# Patient Record
Sex: FEMALE | Race: OTHER | Employment: FULL TIME | URBAN - METROPOLITAN AREA
[De-identification: names, ages, dates, MRNs, and addresses within clinical notes are randomized per-mention and may not be internally consistent; named-entity substitution may affect disease eponyms.]

---

## 2022-11-18 ENCOUNTER — HOSPITAL ENCOUNTER (EMERGENCY)
Age: 47
Discharge: HOME | End: 2022-11-18
Attending: EMERGENCY MEDICINE
Payer: COMMERCIAL

## 2022-11-18 ENCOUNTER — APPOINTMENT (OUTPATIENT)
Dept: CT IMAGING | Age: 47
End: 2022-11-18
Payer: COMMERCIAL

## 2022-11-18 VITALS
WEIGHT: 140 LBS | RESPIRATION RATE: 13 BRPM | HEART RATE: 83 BPM | SYSTOLIC BLOOD PRESSURE: 134 MMHG | DIASTOLIC BLOOD PRESSURE: 91 MMHG | HEIGHT: 62 IN | OXYGEN SATURATION: 99 % | BODY MASS INDEX: 25.76 KG/M2 | TEMPERATURE: 97 F

## 2022-11-18 DIAGNOSIS — S01.01XA LACERATION OF SCALP, INITIAL ENCOUNTER: Primary | ICD-10-CM
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
## 2022-11-18 PROCEDURE — 72125 CT NECK SPINE W/O DYE: CPT

## 2022-11-18 PROCEDURE — 12002 RPR S/N/AX/GEN/TRNK2.6-7.5CM: CPT

## 2022-11-18 PROCEDURE — 70450 CT HEAD/BRAIN W/O DYE: CPT

## 2022-11-18 PROCEDURE — 99284 EMERGENCY DEPT VISIT MOD MDM: CPT

## 2022-11-18 ASSESSMENT — ENCOUNTER SYMPTOMS
ABDOMINAL PAIN: 0
SHORTNESS OF BREATH: 0
NAUSEA: 0
FACIAL SWELLING: 0
VOMITING: 0
BACK PAIN: 0
COUGH: 0
ABDOMINAL DISTENTION: 0

## 2022-11-18 ASSESSMENT — PAIN - FUNCTIONAL ASSESSMENT: PAIN_FUNCTIONAL_ASSESSMENT: NONE - DENIES PAIN

## 2022-11-18 NOTE — ED NOTES
Writer entered pt's room to assess her, pt was out of bed, stated she was getting dressed. Pt was educated that she needs to stay in bed and she needs to stay at hospital until she is medically cleared. Pt verbalizes understanding but states she wants to go home. Dr. Bethany Hays notified.       Niki Benedict RN  11/18/22 1800

## 2022-11-18 NOTE — DISCHARGE INSTRUCTIONS
Go to your primary care physician or return to the emergency department in 7-14 days, to have your staples removed    For pain use acetaminophen (Tylenol) or ibuprofen (Motrin / Advil), unless prescribed medications that have acetaminophen or ibuprofen (or similar medications) in it. You can take over the counter acetaminophen tablets (1 - 2 tablets of the 500-mg strength every 6 hours) or ibuprofen tablets (2 tablets every 4 hours). You can shower with the laceration, would avoid baths or swimming in lakes / rivers. Apply bacitracin / triple antibiotic ointment / Neosporin / Vaseline to the wound twice a day. When you go outside, place sunscreen on the healing wound after the sutures have been removed for the next year to help with scarring. PLEASE RETURN TO THE EMERGENCY DEPARTMENT IMMEDIATELY for worsening symptoms, redness around the wound or redness streaking up the body part, white drainage from the wound, or if you develop any concerning symptoms such as: high fever not relieved by acetaminophen (Tylenol) and/or ibuprofen (Motrin / Advil), chills, shortness of breath, chest pain, feeling of your heart fluttering or racing, persistent nausea and/or vomiting, vomiting up blood, blood in your stool, numbness, loss of consciousness, weakness or tingling in the arms or legs or change in color of the extremities, changes in mental status, persistent headache, blurry vision, loss of bladder / bowel control, unable to follow up with your physician, or other any other care or concern.
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
## 2022-11-18 NOTE — PROCEDURES
PROCEDURE NOTE - LACERATION CLOSURE    PATIENT NAME: Anne Brooks RECORD NO. [id number]  DATE: 11/18/2022  Attending: Dr. Kathy Hyman / Stone Gonzalez MD  PRIMARY CARE PHYSICIAN: No primary care provider on file. PREOPERATIVE DIAGNOSIS: Laceration(s) as follows:   LOCATION: Scalp   LENGTH: 3cm   LAYERED CLOSURE: No    POSTOPERATIVE DIAGNOSIS:  Same  PROCEDURE PERFORMED:  Suture closure of laceration  ANESTHESIA:  Local utilizing  Lidocaine 1% without epinephrine  ESTIMATED BLOOD LOSS:  Less than 25 ml. COMPLICATIONS:  None immediately appreciated. OPERATIVE NOTE PREPARED BY: Stone Gonzalez MD     DISCUSSION:  Tino Richardson is a 56y.o.-year-old female. The history and physical examination were reviewed and confirmed. The diagnoses, proposed procedure, risks, possible complications, benefits and alternatives were discussed with the patient or family. She was given the opportunity to ask questions, and once answered, informed consent was obtained. The patient was then prepared for the procedure. PROCEDURE:  A timeout was initiated and the procedure and patient were confirmed by those present. The laceration was flushed copiously with sterile saline. Thereafter using a total of 4 mL of 1% lidocaine without epinephrine, the wound was anesthetized via local infiltration. 4 staples were applied to the laceration achieving good wound approximation. No immediate complication was evident. All sponge, instrument and needle counts were correct at the completion of the procedure.        Stone Gonzalez MD  11/18/22, 3:15 AM

## 2022-12-02 ENCOUNTER — HOSPITAL ENCOUNTER (EMERGENCY)
Age: 47
Discharge: HOME | End: 2022-12-02
Attending: EMERGENCY MEDICINE
Payer: COMMERCIAL

## 2022-12-02 VITALS
OXYGEN SATURATION: 97 % | HEART RATE: 60 BPM | TEMPERATURE: 97.2 F | DIASTOLIC BLOOD PRESSURE: 72 MMHG | RESPIRATION RATE: 16 BRPM | SYSTOLIC BLOOD PRESSURE: 114 MMHG

## 2022-12-02 DIAGNOSIS — Z48.02 REMOVAL OF STAPLE: Primary | ICD-10-CM

## 2022-12-02 PROCEDURE — 99283 EMERGENCY DEPT VISIT LOW MDM: CPT

## 2022-12-02 PROCEDURE — 6370000000 HC RX 637 (ALT 250 FOR IP)

## 2022-12-02 RX ORDER — IBUPROFEN 800 MG/1
800 TABLET ORAL ONCE
Status: COMPLETED | OUTPATIENT
Start: 2022-12-02 | End: 2022-12-02

## 2022-12-02 RX ORDER — IBUPROFEN 600 MG/1
600 TABLET ORAL 3 TIMES DAILY PRN
Qty: 30 TABLET | Refills: 0 | Status: SHIPPED | OUTPATIENT
Start: 2022-12-02

## 2022-12-02 RX ADMIN — IBUPROFEN 800 MG: 800 TABLET, FILM COATED ORAL at 14:51

## 2022-12-02 ASSESSMENT — PAIN SCALES - GENERAL: PAINLEVEL_OUTOF10: 4

## 2022-12-02 ASSESSMENT — PAIN - FUNCTIONAL ASSESSMENT: PAIN_FUNCTIONAL_ASSESSMENT: NONE - DENIES PAIN

## 2022-12-02 NOTE — DISCHARGE INSTRUCTIONS
Thank you for visiting [facility] Emergency Department. You need to call [facility] to make an appointment as directed for follow up. Should you have any questions regarding your care or further treatment, please call [facility] at [phone].

## 2022-12-02 NOTE — ED PROVIDER NOTES
[facility]  Emergency Department  Faculty Attestation     I performed a history and physical examination of the patient and discussed management with the resident. I reviewed the residents note and agree with the documented findings and plan of care. Any areas of disagreement are noted on the chart. I was personally present for the key portions of any procedures. I have documented in the chart those procedures where I was not present during the key portions. I have reviewed the emergency nurses triage note. I agree with the chief complaint, past medical history, past surgical history, allergies, medications, social and family history as documented unless otherwise noted below. For Physician Assistant/ Nurse Practitioner cases/documentation I have personally evaluated this patient and have completed at least one if not all key elements of the E/M (history, physical exam, and MDM). Additional findings are as noted. Primary Care Physician:  No primary care provider on file. Screenings:  [unfilled]    CHIEF COMPLAINT       Chief Complaint   Patient presents with    Suture / Staple Removal       RECENT VITALS:   Temp: 97.2 °F (36.2 °C),  Heart Rate: 60, Resp: 16, BP: 114/72    LABS:  Labs Reviewed - No data to display    Radiology  No orders to display           Attending Physician Additional  Notes    Patient is here for staple removal of the scalp. There is no drainage fever redness pain or other complaints. On exam she nontoxic afebrile vital signs are normal.  GCS is 15. Normal speech mentation and pupils. Scalp is healed well without evidence of infection. There is no drainage bleeding. Wound is well approximated. Plan is staple removal, bacitracin, follow-up. Ina Chimera.  Luna Enciso MD, [facility]  Attending Emergency  Physician                Roni Dougherty MD  12/02/22 1039

## 2022-12-02 NOTE — ED NOTES
This patient was assessed by the doctor only. Nurse processed and completed the orders from this doctor ie labs, meds, and/or EKG.         Kisha Khan LPN  55/95/46 0375

## 2022-12-02 NOTE — Clinical Note
Cesar Whitney was seen and treated in our emergency department on 12/2/2022. She may return to work on 12/04/2022. If you have any questions or concerns, please don't hesitate to call.       Sandra Wolfe MD
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
## 2024-06-04 ENCOUNTER — HOSPITAL ENCOUNTER (EMERGENCY)
Age: 49
Discharge: HOME | End: 2024-06-05
Attending: EMERGENCY MEDICINE

## 2024-06-04 DIAGNOSIS — N20.0 BILATERAL NEPHROLITHIASIS: ICD-10-CM

## 2024-06-04 DIAGNOSIS — R10.9 LEFT FLANK PAIN: Primary | ICD-10-CM

## 2024-06-04 PROCEDURE — 80048 BASIC METABOLIC PNL TOTAL CA: CPT

## 2024-06-04 PROCEDURE — 84703 CHORIONIC GONADOTROPIN ASSAY: CPT

## 2024-06-04 PROCEDURE — 96374 THER/PROPH/DIAG INJ IV PUSH: CPT | Performed by: EMERGENCY MEDICINE

## 2024-06-04 PROCEDURE — 2580000003 HC RX 258

## 2024-06-04 PROCEDURE — 96375 TX/PRO/DX INJ NEW DRUG ADDON: CPT | Performed by: EMERGENCY MEDICINE

## 2024-06-04 PROCEDURE — 6360000002 HC RX W HCPCS

## 2024-06-04 PROCEDURE — 96361 HYDRATE IV INFUSION ADD-ON: CPT | Performed by: EMERGENCY MEDICINE

## 2024-06-04 PROCEDURE — 99284 EMERGENCY DEPT VISIT MOD MDM: CPT | Performed by: EMERGENCY MEDICINE

## 2024-06-04 PROCEDURE — 6370000000 HC RX 637 (ALT 250 FOR IP)

## 2024-06-04 PROCEDURE — 85025 COMPLETE CBC W/AUTO DIFF WBC: CPT

## 2024-06-04 PROCEDURE — 81001 URINALYSIS AUTO W/SCOPE: CPT

## 2024-06-04 RX ORDER — 0.9 % SODIUM CHLORIDE 0.9 %
1000 INTRAVENOUS SOLUTION INTRAVENOUS ONCE
Status: COMPLETED | OUTPATIENT
Start: 2024-06-04 | End: 2024-06-05

## 2024-06-04 RX ORDER — ONDANSETRON 2 MG/ML
4 INJECTION INTRAMUSCULAR; INTRAVENOUS ONCE
Status: COMPLETED | OUTPATIENT
Start: 2024-06-04 | End: 2024-06-04

## 2024-06-04 RX ORDER — ACETAMINOPHEN 500 MG
1000 TABLET ORAL ONCE
Status: COMPLETED | OUTPATIENT
Start: 2024-06-04 | End: 2024-06-04

## 2024-06-04 RX ADMIN — SODIUM CHLORIDE 1000 ML: 9 INJECTION, SOLUTION INTRAVENOUS at 23:48

## 2024-06-04 RX ADMIN — ACETAMINOPHEN 1000 MG: 500 TABLET ORAL at 23:48

## 2024-06-04 RX ADMIN — ONDANSETRON 4 MG: 2 INJECTION INTRAMUSCULAR; INTRAVENOUS at 23:48

## 2024-06-04 ASSESSMENT — PAIN DESCRIPTION - LOCATION: LOCATION: FLANK

## 2024-06-04 ASSESSMENT — PAIN DESCRIPTION - DESCRIPTORS: DESCRIPTORS: ACHING;SORE;DISCOMFORT

## 2024-06-04 ASSESSMENT — PAIN - FUNCTIONAL ASSESSMENT: PAIN_FUNCTIONAL_ASSESSMENT: 0-10

## 2024-06-04 ASSESSMENT — PAIN DESCRIPTION - ORIENTATION: ORIENTATION: LEFT

## 2024-06-05 ENCOUNTER — APPOINTMENT (OUTPATIENT)
Dept: CT IMAGING | Age: 49
End: 2024-06-05

## 2024-06-05 VITALS
RESPIRATION RATE: 14 BRPM | BODY MASS INDEX: 27.07 KG/M2 | WEIGHT: 148 LBS | OXYGEN SATURATION: 94 % | TEMPERATURE: 97.3 F | DIASTOLIC BLOOD PRESSURE: 70 MMHG | HEART RATE: 74 BPM | SYSTOLIC BLOOD PRESSURE: 107 MMHG

## 2024-06-05 LAB
ANION GAP SERPL CALCULATED.3IONS-SCNC: 11 MMOL/L (ref 9–16)
BACTERIA URNS QL MICRO: ABNORMAL
BASOPHILS # BLD: 0.09 K/UL (ref 0–0.2)
BASOPHILS NFR BLD: 1 % (ref 0–2)
BILIRUB UR QL STRIP: NEGATIVE
BUN SERPL-MCNC: 12 MG/DL (ref 6–20)
CALCIUM SERPL-MCNC: 9.6 MG/DL (ref 8.6–10.4)
CASTS #/AREA URNS LPF: ABNORMAL /LPF (ref 0–8)
CHLORIDE SERPL-SCNC: 102 MMOL/L (ref 98–107)
CLARITY UR: ABNORMAL
CO2 SERPL-SCNC: 25 MMOL/L (ref 20–31)
COLOR UR: YELLOW
CREAT SERPL-MCNC: 0.8 MG/DL (ref 0.5–0.9)
EOSINOPHIL # BLD: 0.13 K/UL (ref 0–0.44)
EOSINOPHILS RELATIVE PERCENT: 1 % (ref 1–4)
EPI CELLS #/AREA URNS HPF: ABNORMAL /HPF (ref 0–5)
ERYTHROCYTE [DISTWIDTH] IN BLOOD BY AUTOMATED COUNT: 16.5 % (ref 11.8–14.4)
GFR, ESTIMATED: >90 ML/MIN/1.73M2
GLUCOSE SERPL-MCNC: 123 MG/DL (ref 74–99)
GLUCOSE UR STRIP-MCNC: NEGATIVE MG/DL
HCG SERPL QL: NEGATIVE
HCT VFR BLD AUTO: 30.7 % (ref 36.3–47.1)
HGB BLD-MCNC: 9.4 G/DL (ref 11.9–15.1)
HGB UR QL STRIP.AUTO: ABNORMAL
IMM GRANULOCYTES # BLD AUTO: 0.03 K/UL (ref 0–0.3)
IMM GRANULOCYTES NFR BLD: 0 %
KETONES UR STRIP-MCNC: NEGATIVE MG/DL
LEUKOCYTE ESTERASE UR QL STRIP: NEGATIVE
LYMPHOCYTES NFR BLD: 2.16 K/UL (ref 1.1–3.7)
LYMPHOCYTES RELATIVE PERCENT: 16 % (ref 24–43)
MCH RBC QN AUTO: 24.5 PG (ref 25.2–33.5)
MCHC RBC AUTO-ENTMCNC: 30.6 G/DL (ref 28.4–34.8)
MCV RBC AUTO: 79.9 FL (ref 82.6–102.9)
MONOCYTES NFR BLD: 0.73 K/UL (ref 0.1–1.2)
MONOCYTES NFR BLD: 5 % (ref 3–12)
NEUTROPHILS NFR BLD: 77 % (ref 36–65)
NEUTS SEG NFR BLD: 10.54 K/UL (ref 1.5–8.1)
NITRITE UR QL STRIP: NEGATIVE
NRBC BLD-RTO: 0 PER 100 WBC
PH UR STRIP: 8.5 [PH] (ref 5–8)
PLATELET # BLD AUTO: 514 K/UL (ref 138–453)
PMV BLD AUTO: 9.1 FL (ref 8.1–13.5)
POTASSIUM SERPL-SCNC: 3.7 MMOL/L (ref 3.7–5.3)
PROT UR STRIP-MCNC: ABNORMAL MG/DL
RBC # BLD AUTO: 3.84 M/UL (ref 3.95–5.11)
RBC # BLD: ABNORMAL 10*6/UL
RBC #/AREA URNS HPF: ABNORMAL /HPF (ref 0–4)
SODIUM SERPL-SCNC: 138 MMOL/L (ref 136–145)
SP GR UR STRIP: 1.01 (ref 1–1.03)
UROBILINOGEN UR STRIP-ACNC: NORMAL EU/DL (ref 0–1)
WBC #/AREA URNS HPF: ABNORMAL /HPF (ref 0–5)
WBC OTHER # BLD: 13.7 K/UL (ref 3.5–11.3)

## 2024-06-05 PROCEDURE — 6360000002 HC RX W HCPCS

## 2024-06-05 PROCEDURE — 74176 CT ABD & PELVIS W/O CONTRAST: CPT

## 2024-06-05 RX ORDER — CEFADROXIL 500 MG/1
500 CAPSULE ORAL 2 TIMES DAILY
Qty: 10 CAPSULE | Refills: 0 | Status: SHIPPED | OUTPATIENT
Start: 2024-06-05 | End: 2024-06-10

## 2024-06-05 RX ORDER — TAMSULOSIN HYDROCHLORIDE 0.4 MG/1
0.4 CAPSULE ORAL DAILY
Qty: 5 CAPSULE | Refills: 0 | Status: SHIPPED | OUTPATIENT
Start: 2024-06-05 | End: 2024-06-10

## 2024-06-05 RX ORDER — OXYCODONE HYDROCHLORIDE 5 MG/1
5 TABLET ORAL EVERY 6 HOURS PRN
Qty: 6 TABLET | Refills: 0 | Status: SHIPPED | OUTPATIENT
Start: 2024-06-05 | End: 2024-06-08

## 2024-06-05 RX ORDER — KETOROLAC TROMETHAMINE 30 MG/ML
30 INJECTION, SOLUTION INTRAMUSCULAR; INTRAVENOUS ONCE
Status: COMPLETED | OUTPATIENT
Start: 2024-06-05 | End: 2024-06-05

## 2024-06-05 RX ORDER — IBUPROFEN 800 MG/1
800 TABLET ORAL EVERY 8 HOURS PRN
Qty: 30 TABLET | Refills: 0 | Status: SHIPPED | OUTPATIENT
Start: 2024-06-05

## 2024-06-05 RX ORDER — ACETAMINOPHEN 500 MG
1000 TABLET ORAL EVERY 6 HOURS PRN
Qty: 30 TABLET | Refills: 1 | Status: SHIPPED | OUTPATIENT
Start: 2024-06-05

## 2024-06-05 RX ORDER — ONDANSETRON 4 MG/1
4 TABLET, ORALLY DISINTEGRATING ORAL 3 TIMES DAILY PRN
Qty: 12 TABLET | Refills: 0 | Status: SHIPPED | OUTPATIENT
Start: 2024-06-05

## 2024-06-05 RX ADMIN — KETOROLAC TROMETHAMINE 30 MG: 30 INJECTION, SOLUTION INTRAMUSCULAR; INTRAVENOUS at 00:15

## 2024-06-05 ASSESSMENT — PAIN - FUNCTIONAL ASSESSMENT: PAIN_FUNCTIONAL_ASSESSMENT: 0-10

## 2024-06-05 ASSESSMENT — ENCOUNTER SYMPTOMS
NAUSEA: 1
RESPIRATORY NEGATIVE: 1
EYES NEGATIVE: 1

## 2024-06-05 ASSESSMENT — PAIN SCALES - GENERAL
PAINLEVEL_OUTOF10: 3
PAINLEVEL_OUTOF10: 7

## 2024-06-05 NOTE — PROGRESS NOTES
This patient was assigned to me in error. I did not see or evaluate this patient. I did not participate in the care or treatment of this patient.    Inés Davis MD

## 2024-06-05 NOTE — ED NOTES
Pt to ED 27 via triage c/o left sided flank pain  x3 hours. Pt states she feels nauseated from the pain. Pt states that it doesn't burn when urinating but it \"feels tingly\". Pt denies any PMH of kidney stones or any kidney problems. Pt denies any abdominal or chest pain, SOB. Pt RR is even and non-labored on arrival, VSS, line established and urine collected. Resident is at the bedside to assess, plan of care ongoing.

## 2024-06-05 NOTE — ED PROVIDER NOTES
[facility] ED  Emergency Department Encounter  Emergency Medicine Resident     Pt Name:Eunice Ortega  MRN: [id number]  Birthdate 1975  Date of evaluation: 6/4/24  PCP:  No primary care provider on file.  Note Started: 11:43 PM EDT      CHIEF COMPLAINT       Chief Complaint   Patient presents with    Flank Pain     Left sided       HISTORY OF PRESENT ILLNESS  (Location/Symptom, Timing/Onset, Context/Setting, Quality, Duration, Modifying Factors, Severity.)      Eunice Ortega is a 48 y.o. female who presents with left flank pain.  Patient states left flank pain and difficulty voiding started approximately 4 hours ago at the end of her work shift.  Patient explains that she started having a little trouble voiding with urgency.  She also explains that she feels \"tingling\" on voiding.  Patient denies any history of nephrolithiasis or hematuria.    PAST MEDICAL / SURGICAL / SOCIAL / FAMILY HISTORY      has no past medical history on file.       has no past surgical history on file.      Social History     Socioeconomic History    Marital status: Single     Spouse name: Not on file    Number of children: Not on file    Years of education: Not on file    Highest education level: Not on file   Occupational History    Not on file   Tobacco Use    Smoking status: Not on file    Smokeless tobacco: Not on file   Substance and Sexual Activity    Alcohol use: Not on file    Drug use: Not on file    Sexual activity: Not on file   Other Topics Concern    Not on file   Social History Narrative    Not on file     Social Determinants of Health     Financial Resource Strain: Not on file   Food Insecurity: Not on file   Transportation Needs: Not on file   Physical Activity: Not on file   Stress: Not on file   Social Connections: Not on file   Intimate Partner Violence: Not on file   Housing Stability: Not on file       History reviewed. No pertinent family history.    Allergies:  Patient has no known  for a visit in 2 days  Call to schedule an appointment in order to discuss your symptoms, this ER visit and CT findings of stones in your kidneys.    [facility] ED  [address] [address]  [phone]    As needed, If symptoms worsen      DISCHARGE MEDICATIONS:  New Prescriptions    ACETAMINOPHEN (TYLENOL) 500 MG TABLET    Take 2 tablets by mouth every 6 hours as needed for Pain    CEFADROXIL (DURICEF) 500 MG CAPSULE    Take 1 capsule by mouth 2 times daily for 5 days    IBUPROFEN (ADVIL;MOTRIN) 800 MG TABLET    Take 1 tablet by mouth every 8 hours as needed for Pain    ONDANSETRON (ZOFRAN-ODT) 4 MG DISINTEGRATING TABLET    Take 1 tablet by mouth 3 times daily as needed for Nausea or Vomiting    OXYCODONE (ROXICODONE) 5 MG IMMEDIATE RELEASE TABLET    Take 1 tablet by mouth every 6 hours as needed for Pain for up to 6 doses. Intended supply: 3 days. Take lowest dose possible to manage pain Max Daily Amount: 20 mg    TAMSULOSIN (FLOMAX) 0.4 MG CAPSULE    Take 1 capsule by mouth daily for 5 doses       Clay Perea MD  Emergency Medicine Resident    (Please note that portions of thisnote were completed with a voice recognition program.  Efforts were made to edit the dictations but occasionally words are mis-transcribed.)

## 2024-06-05 NOTE — DISCHARGE INSTRUCTIONS
He was seen and evaluated for left side pain.  Our imaging showed that you had stones in both kidneys with the larger stones in your left.  We consulted our urology team.  They recommended the prescribed medications, please take these medications as directed.  Please use the urine strainer in order to strain your urine in an attempt to see any stones that you might have passed in your urine.  Please call the urology referral clinic in order to schedule your appointment in order to discuss this ER visit, your symptoms, and CT findings of stones in your kidneys.  Please return to the ER for any fever that does not go away with medication, chest pain, shortness of breath, or to answer any other questions or concerns.

## 2024-06-07 NOTE — ED PROVIDER NOTES
[facility]   Emergency Department  Faculty Attestation       I performed a history and physical examination of the patient and discussed management with the resident. I reviewed the resident’s note and agree with the documented findings including all diagnostic interpretations and plan of care. Any areas of disagreement are noted on the chart. I was personally present for the key portions of any procedures. I have documented in the chart those procedures where I was not present during the key portions. I have reviewed the emergency nurses triage note. I agree with the chief complaint, past medical history, past surgical history, allergies, medications, social and family history as documented unless otherwise noted below.  For Physician Assistant/ Nurse Practitioner cases/documentation I have personally evaluated this patient and have completed at least one if not all key elements of the E/M (history, physical exam, and MDM). Additional findings are as noted.    Patient Name: Eunice Ortega  MRN: [id number]  : 1975  Primary Care Physician: No primary care provider on file.    Date of evaluationa: 2024   Note Started: 8:36 PM EDT    Pertinent Comments     Chief Complaint:   Chief Complaint   Patient presents with    Flank Pain     Left sided        Initial vitals: (If not listed, please see nursing documentation)  ED Triage Vitals [24 2312]   BP Temp Temp Source Pulse Respirations SpO2 Height Weight - Scale   125/83 97.3 °F (36.3 °C) Oral 70 16 100 % -- 67.1 kg (148 lb)        HPI/PE/Impression:  This is a 48 y.o. female who presents to the Emergency Department w/ left flank pain that started approximately 4 hours PTA.  States that it is in the left flank and radiates around the side.  Has an abnormal sensation when trying to void and is also having urgency.  No fevers.  No diarrhea. No h/o kidney stones.   On exam patient had just received toradol and states that she is feeling  much improved.   NC/AT. Talking in full sentences in no acute respiratory distress.  Has no significant LUQ tenderness, but does have mild left flank tenderness. No rebound or guarding.  Alert and oriented with normal coordination.     Medical Decision Making  Left flank pain and urinary symptoms  High suspicion of UTI vs pyelo vs kidney stone   No abdominal tenderness     Will check cbc, bmp, UA and CT a/p without contrast    Patient found to have a 6 mm stone at the UVJ and multiple intrarenal stones  Mildly elevated wbc  Normal kidney function    Pain well controlled  Urology consulted - resident spoke tothem and they recommended flomax, antibiotics, outpatient f/u  Given pain control and urine strainer  Ok for dischage    Problems Addressed:  Left flank pain: acute illness or injury    Amount and/or Complexity of Data Reviewed  Labs: ordered. Decision-making details documented in ED Course.  Radiology: ordered. Decision-making details documented in ED Course.    Risk  OTC drugs.  Prescription drug management.         Critical Care: None     Mery Mcrae MD  Attending Emergency Physician        Mery Mcrae MD  06/06/24 8172

## 2024-10-02 ENCOUNTER — HOSPITAL ENCOUNTER (INPATIENT)
Age: 49
LOS: 14 days | Discharge: HOME HEALTH | DRG: 330 | End: 2024-10-16
Attending: EMERGENCY MEDICINE | Admitting: SURGERY
Payer: COMMERCIAL

## 2024-10-02 ENCOUNTER — APPOINTMENT (OUTPATIENT)
Dept: CT IMAGING | Age: 49
DRG: 330 | End: 2024-10-02
Payer: COMMERCIAL

## 2024-10-02 DIAGNOSIS — K56.609 SMALL BOWEL OBSTRUCTION (HCC): ICD-10-CM

## 2024-10-02 DIAGNOSIS — N30.00 ACUTE CYSTITIS WITHOUT HEMATURIA: ICD-10-CM

## 2024-10-02 DIAGNOSIS — K57.32 SIGMOID DIVERTICULITIS: Primary | ICD-10-CM

## 2024-10-02 PROBLEM — K57.92 ACUTE DIVERTICULITIS: Status: ACTIVE | Noted: 2024-10-02

## 2024-10-02 LAB
ANION GAP SERPL CALCULATED.3IONS-SCNC: 11 MMOL/L (ref 9–16)
BACTERIA URNS QL MICRO: ABNORMAL
BASOPHILS # BLD: 0.03 K/UL (ref 0–0.2)
BASOPHILS NFR BLD: 0 % (ref 0–2)
BILIRUB UR QL STRIP: NEGATIVE
BUN SERPL-MCNC: 8 MG/DL (ref 6–20)
CALCIUM SERPL-MCNC: 8.6 MG/DL (ref 8.6–10.4)
CHLORIDE SERPL-SCNC: 103 MMOL/L (ref 98–107)
CLARITY UR: ABNORMAL
CO2 SERPL-SCNC: 24 MMOL/L (ref 20–31)
COLOR UR: YELLOW
CREAT SERPL-MCNC: 0.8 MG/DL (ref 0.5–0.9)
CRYSTALS URNS MICRO: ABNORMAL /HPF
CRYSTALS URNS MICRO: ABNORMAL /HPF
EOSINOPHIL # BLD: 0.07 K/UL (ref 0–0.44)
EOSINOPHILS RELATIVE PERCENT: 1 % (ref 1–4)
EPI CELLS #/AREA URNS HPF: ABNORMAL /HPF (ref 0–5)
ERYTHROCYTE [DISTWIDTH] IN BLOOD BY AUTOMATED COUNT: 20 % (ref 11.8–14.4)
FOLATE SERPL-MCNC: 10.5 NG/ML (ref 4.8–24.2)
GFR, ESTIMATED: 86 ML/MIN/1.73M2
GLUCOSE SERPL-MCNC: 96 MG/DL (ref 74–99)
GLUCOSE UR STRIP-MCNC: NEGATIVE MG/DL
HCG SERPL QL: NEGATIVE
HCG UR QL: NEGATIVE
HCT VFR BLD AUTO: 32.7 % (ref 36.3–47.1)
HGB BLD-MCNC: 10.1 G/DL (ref 11.9–15.1)
HGB UR QL STRIP.AUTO: NEGATIVE
IMM GRANULOCYTES # BLD AUTO: 0.05 K/UL (ref 0–0.3)
IMM GRANULOCYTES NFR BLD: 1 %
INR PPP: 1
KETONES UR STRIP-MCNC: NEGATIVE MG/DL
LACTIC ACID, SEPSIS WHOLE BLOOD: 1 MMOL/L (ref 0.5–1.9)
LEUKOCYTE ESTERASE UR QL STRIP: ABNORMAL
LYMPHOCYTES NFR BLD: 1.3 K/UL (ref 1.1–3.7)
LYMPHOCYTES RELATIVE PERCENT: 15 % (ref 24–43)
MCH RBC QN AUTO: 24.8 PG (ref 25.2–33.5)
MCHC RBC AUTO-ENTMCNC: 30.9 G/DL (ref 28.4–34.8)
MCV RBC AUTO: 80.3 FL (ref 82.6–102.9)
MONOCYTES NFR BLD: 0.52 K/UL (ref 0.1–1.2)
MONOCYTES NFR BLD: 6 % (ref 3–12)
MUCOUS THREADS URNS QL MICRO: ABNORMAL
NEUTROPHILS NFR BLD: 77 % (ref 36–65)
NEUTS SEG NFR BLD: 6.81 K/UL (ref 1.5–8.1)
NITRITE UR QL STRIP: NEGATIVE
NRBC BLD-RTO: 0.2 PER 100 WBC
PARTIAL THROMBOPLASTIN TIME: 27.4 SEC (ref 23–36.5)
PH UR STRIP: >9 [PH] (ref 5–8)
PLATELET # BLD AUTO: 516 K/UL (ref 138–453)
PMV BLD AUTO: 9.4 FL (ref 8.1–13.5)
POTASSIUM SERPL-SCNC: 4.7 MMOL/L (ref 3.7–5.3)
PROT UR STRIP-MCNC: ABNORMAL MG/DL
PROTHROMBIN TIME: 13 SEC (ref 11.7–14.9)
RBC # BLD AUTO: 4.07 M/UL (ref 3.95–5.11)
RBC # BLD: ABNORMAL 10*6/UL
RBC #/AREA URNS HPF: ABNORMAL /HPF (ref 0–2)
SODIUM SERPL-SCNC: 138 MMOL/L (ref 136–145)
SP GR UR STRIP: 1.02 (ref 1–1.03)
UROBILINOGEN UR STRIP-ACNC: NORMAL EU/DL (ref 0–1)
VIT B12 SERPL-MCNC: <150 PG/ML (ref 232–1245)
WBC #/AREA URNS HPF: ABNORMAL /HPF (ref 0–5)
WBC OTHER # BLD: 8.8 K/UL (ref 3.5–11.3)

## 2024-10-02 PROCEDURE — 99222 1ST HOSP IP/OBS MODERATE 55: CPT | Performed by: PHYSICIAN ASSISTANT

## 2024-10-02 PROCEDURE — 2580000003 HC RX 258: Performed by: EMERGENCY MEDICINE

## 2024-10-02 PROCEDURE — 2580000003 HC RX 258: Performed by: PHYSICIAN ASSISTANT

## 2024-10-02 PROCEDURE — 85025 COMPLETE CBC W/AUTO DIFF WBC: CPT

## 2024-10-02 PROCEDURE — 2060000000 HC ICU INTERMEDIATE R&B

## 2024-10-02 PROCEDURE — 99285 EMERGENCY DEPT VISIT HI MDM: CPT

## 2024-10-02 PROCEDURE — 6360000002 HC RX W HCPCS

## 2024-10-02 PROCEDURE — 83550 IRON BINDING TEST: CPT

## 2024-10-02 PROCEDURE — 81001 URINALYSIS AUTO W/SCOPE: CPT

## 2024-10-02 PROCEDURE — 96374 THER/PROPH/DIAG INJ IV PUSH: CPT

## 2024-10-02 PROCEDURE — 81025 URINE PREGNANCY TEST: CPT

## 2024-10-02 PROCEDURE — 83540 ASSAY OF IRON: CPT

## 2024-10-02 PROCEDURE — 84703 CHORIONIC GONADOTROPIN ASSAY: CPT

## 2024-10-02 PROCEDURE — 36415 COLL VENOUS BLD VENIPUNCTURE: CPT

## 2024-10-02 PROCEDURE — 80048 BASIC METABOLIC PNL TOTAL CA: CPT

## 2024-10-02 PROCEDURE — 2580000003 HC RX 258

## 2024-10-02 PROCEDURE — 87040 BLOOD CULTURE FOR BACTERIA: CPT

## 2024-10-02 PROCEDURE — 82728 ASSAY OF FERRITIN: CPT

## 2024-10-02 PROCEDURE — 74176 CT ABD & PELVIS W/O CONTRAST: CPT

## 2024-10-02 PROCEDURE — 83605 ASSAY OF LACTIC ACID: CPT

## 2024-10-02 PROCEDURE — 6360000002 HC RX W HCPCS: Performed by: PHYSICIAN ASSISTANT

## 2024-10-02 PROCEDURE — 96376 TX/PRO/DX INJ SAME DRUG ADON: CPT

## 2024-10-02 PROCEDURE — 6360000002 HC RX W HCPCS: Performed by: EMERGENCY MEDICINE

## 2024-10-02 PROCEDURE — 6370000000 HC RX 637 (ALT 250 FOR IP): Performed by: PHYSICIAN ASSISTANT

## 2024-10-02 PROCEDURE — 85610 PROTHROMBIN TIME: CPT

## 2024-10-02 PROCEDURE — 85730 THROMBOPLASTIN TIME PARTIAL: CPT

## 2024-10-02 PROCEDURE — 87086 URINE CULTURE/COLONY COUNT: CPT

## 2024-10-02 PROCEDURE — 96365 THER/PROPH/DIAG IV INF INIT: CPT

## 2024-10-02 PROCEDURE — 82607 VITAMIN B-12: CPT

## 2024-10-02 PROCEDURE — 82746 ASSAY OF FOLIC ACID SERUM: CPT

## 2024-10-02 RX ORDER — MORPHINE SULFATE 4 MG/ML
1 INJECTION, SOLUTION INTRAMUSCULAR; INTRAVENOUS EVERY 4 HOURS PRN
Status: DISCONTINUED | OUTPATIENT
Start: 2024-10-02 | End: 2024-10-05

## 2024-10-02 RX ORDER — FENTANYL CITRATE 50 UG/ML
50 INJECTION, SOLUTION INTRAMUSCULAR; INTRAVENOUS ONCE
Status: COMPLETED | OUTPATIENT
Start: 2024-10-02 | End: 2024-10-02

## 2024-10-02 RX ORDER — SODIUM CHLORIDE 9 MG/ML
INJECTION, SOLUTION INTRAVENOUS PRN
Status: DISCONTINUED | OUTPATIENT
Start: 2024-10-02 | End: 2024-10-16 | Stop reason: HOSPADM

## 2024-10-02 RX ORDER — POTASSIUM CHLORIDE 1500 MG/1
40 TABLET, EXTENDED RELEASE ORAL PRN
Status: DISCONTINUED | OUTPATIENT
Start: 2024-10-02 | End: 2024-10-10

## 2024-10-02 RX ORDER — OXYCODONE HYDROCHLORIDE 5 MG/1
5 TABLET ORAL EVERY 4 HOURS PRN
Status: DISCONTINUED | OUTPATIENT
Start: 2024-10-02 | End: 2024-10-14

## 2024-10-02 RX ORDER — POLYETHYLENE GLYCOL 3350 17 G/17G
17 POWDER, FOR SOLUTION ORAL DAILY PRN
Status: DISCONTINUED | OUTPATIENT
Start: 2024-10-02 | End: 2024-10-05

## 2024-10-02 RX ORDER — ACETAMINOPHEN 325 MG/1
650 TABLET ORAL EVERY 6 HOURS PRN
Status: DISCONTINUED | OUTPATIENT
Start: 2024-10-02 | End: 2024-10-05

## 2024-10-02 RX ORDER — SODIUM CHLORIDE 0.9 % (FLUSH) 0.9 %
5-40 SYRINGE (ML) INJECTION EVERY 12 HOURS SCHEDULED
Status: DISCONTINUED | OUTPATIENT
Start: 2024-10-02 | End: 2024-10-07

## 2024-10-02 RX ORDER — METRONIDAZOLE 500 MG/100ML
500 INJECTION, SOLUTION INTRAVENOUS EVERY 8 HOURS
Status: DISCONTINUED | OUTPATIENT
Start: 2024-10-03 | End: 2024-10-03

## 2024-10-02 RX ORDER — MAGNESIUM SULFATE IN WATER 40 MG/ML
2000 INJECTION, SOLUTION INTRAVENOUS PRN
Status: DISCONTINUED | OUTPATIENT
Start: 2024-10-02 | End: 2024-10-07

## 2024-10-02 RX ORDER — SODIUM CHLORIDE, SODIUM LACTATE, POTASSIUM CHLORIDE, AND CALCIUM CHLORIDE .6; .31; .03; .02 G/100ML; G/100ML; G/100ML; G/100ML
500 INJECTION, SOLUTION INTRAVENOUS ONCE
Status: DISCONTINUED | OUTPATIENT
Start: 2024-10-02 | End: 2024-10-02

## 2024-10-02 RX ORDER — SODIUM CHLORIDE 0.9 % (FLUSH) 0.9 %
5-40 SYRINGE (ML) INJECTION PRN
Status: DISCONTINUED | OUTPATIENT
Start: 2024-10-02 | End: 2024-10-16 | Stop reason: HOSPADM

## 2024-10-02 RX ORDER — 0.9 % SODIUM CHLORIDE 0.9 %
1000 INTRAVENOUS SOLUTION INTRAVENOUS ONCE
Status: COMPLETED | OUTPATIENT
Start: 2024-10-02 | End: 2024-10-02

## 2024-10-02 RX ORDER — ONDANSETRON 4 MG/1
4 TABLET, ORALLY DISINTEGRATING ORAL EVERY 8 HOURS PRN
Status: DISCONTINUED | OUTPATIENT
Start: 2024-10-02 | End: 2024-10-16 | Stop reason: HOSPADM

## 2024-10-02 RX ORDER — SODIUM CHLORIDE 9 MG/ML
INJECTION, SOLUTION INTRAVENOUS CONTINUOUS
Status: ACTIVE | OUTPATIENT
Start: 2024-10-02 | End: 2024-10-04

## 2024-10-02 RX ORDER — ONDANSETRON 2 MG/ML
4 INJECTION INTRAMUSCULAR; INTRAVENOUS EVERY 6 HOURS PRN
Status: DISCONTINUED | OUTPATIENT
Start: 2024-10-02 | End: 2024-10-16 | Stop reason: HOSPADM

## 2024-10-02 RX ORDER — SODIUM CHLORIDE, SODIUM LACTATE, POTASSIUM CHLORIDE, AND CALCIUM CHLORIDE .6; .31; .03; .02 G/100ML; G/100ML; G/100ML; G/100ML
500 INJECTION, SOLUTION INTRAVENOUS ONCE
Status: COMPLETED | OUTPATIENT
Start: 2024-10-02 | End: 2024-10-02

## 2024-10-02 RX ORDER — POTASSIUM CHLORIDE 7.45 MG/ML
10 INJECTION INTRAVENOUS PRN
Status: DISCONTINUED | OUTPATIENT
Start: 2024-10-02 | End: 2024-10-07

## 2024-10-02 RX ORDER — OXYCODONE HYDROCHLORIDE 5 MG/1
10 TABLET ORAL EVERY 4 HOURS PRN
Status: DISCONTINUED | OUTPATIENT
Start: 2024-10-02 | End: 2024-10-07

## 2024-10-02 RX ORDER — ACETAMINOPHEN 650 MG/1
650 SUPPOSITORY RECTAL EVERY 6 HOURS PRN
Status: DISCONTINUED | OUTPATIENT
Start: 2024-10-02 | End: 2024-10-05

## 2024-10-02 RX ORDER — ENOXAPARIN SODIUM 100 MG/ML
40 INJECTION SUBCUTANEOUS DAILY
Status: DISCONTINUED | OUTPATIENT
Start: 2024-10-02 | End: 2024-10-05

## 2024-10-02 RX ORDER — CIPROFLOXACIN 2 MG/ML
400 INJECTION, SOLUTION INTRAVENOUS EVERY 12 HOURS
Status: DISCONTINUED | OUTPATIENT
Start: 2024-10-02 | End: 2024-10-03

## 2024-10-02 RX ORDER — METRONIDAZOLE 500 MG/100ML
500 INJECTION, SOLUTION INTRAVENOUS ONCE
Status: COMPLETED | OUTPATIENT
Start: 2024-10-02 | End: 2024-10-02

## 2024-10-02 RX ADMIN — FENTANYL CITRATE 50 MCG: 50 INJECTION, SOLUTION INTRAMUSCULAR; INTRAVENOUS at 16:17

## 2024-10-02 RX ADMIN — OXYCODONE 10 MG: 5 TABLET ORAL at 20:13

## 2024-10-02 RX ADMIN — FENTANYL CITRATE 50 MCG: 50 INJECTION, SOLUTION INTRAMUSCULAR; INTRAVENOUS at 15:30

## 2024-10-02 RX ADMIN — METRONIDAZOLE 500 MG: 500 INJECTION, SOLUTION INTRAVENOUS at 20:10

## 2024-10-02 RX ADMIN — SODIUM CHLORIDE, POTASSIUM CHLORIDE, SODIUM LACTATE AND CALCIUM CHLORIDE 500 ML: 600; 310; 30; 20 INJECTION, SOLUTION INTRAVENOUS at 16:16

## 2024-10-02 RX ADMIN — SODIUM CHLORIDE, PRESERVATIVE FREE 10 ML: 5 INJECTION INTRAVENOUS at 20:45

## 2024-10-02 RX ADMIN — CIPROFLOXACIN 400 MG: 2 INJECTION, SOLUTION INTRAVENOUS at 21:04

## 2024-10-02 RX ADMIN — CEFTRIAXONE SODIUM 1000 MG: 1 INJECTION, POWDER, FOR SOLUTION INTRAMUSCULAR; INTRAVENOUS at 17:14

## 2024-10-02 RX ADMIN — SODIUM CHLORIDE: 9 INJECTION, SOLUTION INTRAVENOUS at 22:13

## 2024-10-02 RX ADMIN — SODIUM CHLORIDE 1000 ML: 9 INJECTION, SOLUTION INTRAVENOUS at 17:45

## 2024-10-02 RX ADMIN — FENTANYL CITRATE 50 MCG: 50 INJECTION, SOLUTION INTRAMUSCULAR; INTRAVENOUS at 18:29

## 2024-10-02 RX ADMIN — ENOXAPARIN SODIUM 40 MG: 100 INJECTION SUBCUTANEOUS at 22:17

## 2024-10-02 RX ADMIN — MORPHINE SULFATE 1 MG: 4 INJECTION INTRAVENOUS at 22:17

## 2024-10-02 RX ADMIN — SODIUM CHLORIDE, POTASSIUM CHLORIDE, SODIUM LACTATE AND CALCIUM CHLORIDE 500 ML: 600; 310; 30; 20 INJECTION, SOLUTION INTRAVENOUS at 15:14

## 2024-10-02 ASSESSMENT — PAIN DESCRIPTION - LOCATION
LOCATION: BACK;ABDOMEN
LOCATION: ABDOMEN;BACK
LOCATION: ABDOMEN
LOCATION: BACK;ABDOMEN
LOCATION: ABDOMEN

## 2024-10-02 ASSESSMENT — PAIN SCALES - GENERAL
PAINLEVEL_OUTOF10: 10
PAINLEVEL_OUTOF10: 9
PAINLEVEL_OUTOF10: 9
PAINLEVEL_OUTOF10: 8
PAINLEVEL_OUTOF10: 8
PAINLEVEL_OUTOF10: 10

## 2024-10-02 ASSESSMENT — PAIN DESCRIPTION - FREQUENCY
FREQUENCY: CONTINUOUS
FREQUENCY: CONTINUOUS

## 2024-10-02 ASSESSMENT — PAIN DESCRIPTION - DESCRIPTORS
DESCRIPTORS: DULL;ACHING
DESCRIPTORS: ACHING
DESCRIPTORS: BURNING;ACHING;SQUEEZING
DESCRIPTORS: SHARP;SHOOTING;SORE
DESCRIPTORS: BURNING;CRAMPING
DESCRIPTORS: CRAMPING;BURNING

## 2024-10-02 ASSESSMENT — PAIN DESCRIPTION - DIRECTION: RADIATING_TOWARDS: BACK

## 2024-10-02 ASSESSMENT — LIFESTYLE VARIABLES
HOW MANY STANDARD DRINKS CONTAINING ALCOHOL DO YOU HAVE ON A TYPICAL DAY: 3 OR 4
HOW OFTEN DO YOU HAVE A DRINK CONTAINING ALCOHOL: 2-3 TIMES A WEEK

## 2024-10-02 ASSESSMENT — PAIN DESCRIPTION - PAIN TYPE: TYPE: ACUTE PAIN

## 2024-10-02 ASSESSMENT — PAIN DESCRIPTION - ONSET: ONSET: ON-GOING

## 2024-10-02 ASSESSMENT — PAIN DESCRIPTION - ORIENTATION
ORIENTATION: RIGHT;LOWER
ORIENTATION: LEFT;RIGHT
ORIENTATION: LEFT;RIGHT;LOWER
ORIENTATION: LEFT;LOWER;RIGHT
ORIENTATION: RIGHT;LEFT;POSTERIOR

## 2024-10-02 ASSESSMENT — PAIN - FUNCTIONAL ASSESSMENT
PAIN_FUNCTIONAL_ASSESSMENT: 0-10
PAIN_FUNCTIONAL_ASSESSMENT: PREVENTS OR INTERFERES SOME ACTIVE ACTIVITIES AND ADLS
PAIN_FUNCTIONAL_ASSESSMENT: ACTIVITIES ARE NOT PREVENTED
PAIN_FUNCTIONAL_ASSESSMENT: PREVENTS OR INTERFERES SOME ACTIVE ACTIVITIES AND ADLS
PAIN_FUNCTIONAL_ASSESSMENT: ACTIVITIES ARE NOT PREVENTED

## 2024-10-02 NOTE — ED PROVIDER NOTES
[facility] ED  Emergency Department  Emergency Medicine Resident Turn-Over     Note Started: 5:11 PM EDT    Care of Eunice Ortega was assumed from Dr. Brar and is being seen for Urinary Frequency and Constipation (RLQ pain)  .  The patient's initial evaluation and plan have been discussed with the prior provider who initially evaluated the patient.     EMERGENCY DEPARTMENT COURSE / MEDICAL DECISION MAKING:       MEDICATIONS GIVEN:  Orders Placed This Encounter   Medications    DISCONTD: lactated ringers bolus 500 mL    lactated ringers bolus 500 mL    lactated ringers bolus 500 mL    fentaNYL (SUBLIMAZE) injection 50 mcg    fentaNYL (SUBLIMAZE) injection 50 mcg    cefTRIAXone (ROCEPHIN) 1,000 mg in sodium chloride 0.9 % 50 mL IVPB (mini-bag)     Order Specific Question:   Antimicrobial Indications     Answer:   Urinary Tract Infection    sodium chloride 0.9 % bolus 1,000 mL    fentaNYL (SUBLIMAZE) injection 50 mcg    metroNIDAZOLE (FLAGYL) 500 mg in 0.9% NaCl 100 mL IVPB premix     Order Specific Question:   Antimicrobial Indications     Answer:   Intra-Abdominal Infection       LABS / RADIOLOGY:     Labs Reviewed   CBC WITH AUTO DIFFERENTIAL - Abnormal; Notable for the following components:       Result Value    Hemoglobin 10.1 (*)     Hematocrit 32.7 (*)     MCV 80.3 (*)     MCH 24.8 (*)     RDW 20.0 (*)     Platelets 516 (*)     NRBC Automated 0.2 (*)     Neutrophils % 77 (*)     Lymphocytes % 15 (*)     Immature Granulocytes % 1 (*)     All other components within normal limits   URINALYSIS WITH MICROSCOPIC - Abnormal; Notable for the following components:    Turbidity UA Turbid (*)     pH, Urine >9.0 (*)     Protein, UA NEGATIVE  Verified by sulfosalicylic acid test. (*)     Leukocyte Esterase, Urine MODERATE (*)     Crystals, UA TRIPLE PHOSPHATE (*)     Crystals, UA FEW (*)     Bacteria, UA MODERATE (*)     All other components within normal limits   CULTURE, BLOOD 1   CULTURE, BLOOD 1 
   [facility]     Emergency Department     Faculty Attestation    I performed a history and physical examination of the patient and discussed management with the resident. I reviewed the resident’s note and agree with the documented findings and plan of care. Any areas of disagreement are noted on the chart. I was personally present for the key portions of any procedures. I have documented in the chart those procedures where I was not present during the key portions. I have reviewed the emergency nurses triage note. I agree with the chief complaint, past medical history, past surgical history, allergies, medications, social and family history as documented unless otherwise noted below. Documentation of the HPI, Physical Exam and Medical Decision Making performed by medical students or scribes is based on my personal performance of the HPI, PE and MDM. For Physician Assistant/ Nurse Practitioner cases/documentation I have personally evaluated this patient and have completed at least one if not all key elements of the E/M (history, physical exam, and MDM). Additional findings are as noted.    Vital signs:   Vitals:    10/02/24 1437   BP: (!) 88/49   Pulse: 65   Resp: 18   Temp: 97.5 °F (36.4 °C)   SpO2: 98%      49-year-old female presents complaining of left lower quadrant abdominal pain, urinary frequency, constipation.  Patient states that her stool started out firm, but are now like most.  Patient has a past history significant for kidney stones.  No prior abdominal surgery.  On exam, the patient appears uncomfortable.  Her abdomen is soft, but she has significant tenderness over the lower abdomen with guarding and rebound.  She has left CVA tenderness as well.  Plan for labs, CT abdomen pelvis, IV fluid, pain control            Meenu Laws M.D,  Attending Emergency  Physician            Meenu Laws MD  10/02/24 1135    
  Monocytes % 6   Eosinophils % 1   Basophils % 0   Immature Granulocytes % 1(!)   Neutrophils Absolute 6.81   Lymphocytes Absolute 1.30   Monocytes Absolute 0.52   Eosinophils Absolute 0.07   Basophils Absolute 0.03   Immature Granulocytes Absolute 0.05   RBC Morphology ANISOCYTOSIS PRESENT MICROCYTOSIS PRESENT [AK]   1638 Urinalysis with Microscopic(!):    Color, UA Yellow   Turbidity UA Turbid(!)   Glucose, Ur NEGATIVE   Bilirubin, Urine NEGATIVE   Ketones, Urine NEGATIVE   Specific [place], UA 1.021   Urine Hgb NEGATIVE   pH, Urine >9.0(!)   Protein, UA NEGATIVE  Verified by sulfosalicylic acid test.(!)   Urobilinogen Normal   Nitrite, Urine NEGATIVE   Leukocyte Esterase, Urine MODERATE(!)   WBC, UA 20 TO 50   RBC, UA 2 TO 5   Crystals, UA TRIPLE PHOSPHATE(!)   Crystals, UA FEW(!)   Epithelial Cells, UA 10 TO 20   Bacteria, UA MODERATE(!)   Mucus, UA 2+ [AK]   1638 Lactate, Sepsis [AK]   1638 Basic Metabolic Panel:    Sodium 138   Potassium 4.7   Chloride 103   CARBON DIOXIDE 24   Anion Gap 11   Glucose 96   BUN,BUNPL 8   Creatinine 0.8   Est, Glom Filt Rate 86   Calcium 8.6 [AK]   1639 Culture, Urine [AK]      ED Course User Index  [AK] Bj Brar MD         CONSULTS:  None      FINAL IMPRESSION      1. Generalized abdominal pain    2. Acute cystitis without hematuria          DISPOSITION / PLAN     DISPOSITION    Condition at Disposition: Data Unavailable      PATIENT REFERRED TO:  No follow-up provider specified.    DISCHARGE MEDICATIONS:  New Prescriptions    No medications on file       Bj Brar MD  Internal  medicine Resident    (Please note that portions of thisnote were completed with a voice recognition program.  Efforts were made to edit the dictations but occasionally words are mis-transcribed.)

## 2024-10-02 NOTE — H&P
145 mmol/L    Potassium 4.7 3.7 - 5.3 mmol/L    Chloride 103 98 - 107 mmol/L    CO2 24 20 - 31 mmol/L    Anion Gap 11 9 - 16 mmol/L    Glucose 96 74 - 99 mg/dL    BUN 8 6 - 20 mg/dL    Creatinine 0.8 0.50 - 0.90 mg/dL    Est, Glom Filt Rate 86 >60 mL/min/1.73m2    Calcium 8.6 8.6 - 10.4 mg/dL   Lactate, Sepsis    Collection Time: 10/02/24  3:23 PM   Result Value Ref Range    Lactic Acid, Sepsis, Whole Blood 1.0 0.5 - 1.9 mmol/L   Protime-INR    Collection Time: 10/02/24  3:23 PM   Result Value Ref Range    Protime 13.0 11.7 - 14.9 sec    INR 1.0    APTT    Collection Time: 10/02/24  3:23 PM   Result Value Ref Range    APTT 27.4 23.0 - 36.5 sec   HCG Qualitative, Serum    Collection Time: 10/02/24  3:23 PM   Result Value Ref Range    Preg, Serum NEGATIVE NEGATIVE   Culture, Blood 1    Collection Time: 10/02/24  3:38 PM    Specimen: Blood   Result Value Ref Range    Specimen Description .BLOOD     Special Requests RT AC 10ML     Culture NO GROWTH <24 HRS        Imaging/Diagnostics:  CT ABDOMEN PELVIS WO CONTRAST Additional Contrast? None    Result Date: 10/2/2024  1. Acute sigmoid diverticulitis. No evidence for gross perforation, abscess or bowel obstruction. Follow-up with imaging and/or colonoscopy is recommended to ensure resolution. 2. Bilateral punctate nonobstructing renal calculi.       Assessment :      Hospital Problems             Last Modified POA    * (Principal) Acute diverticulitis 10/2/2024 Yes    Acute cystitis without hematuria 10/2/2024 Yes       Plan:     Patient status inpatient in the Med/Surge    Abdominal pain with concern for acute diverticulitis  Continue ciprofloxacin and Flagyl  Bowel rest overnight  Supportive IV fluids  Pain management as needed    UTI  Continue antibiotics  Urine culture pending     Anemia  Hemoglobin stable  Iron studies, B12, and folate ordered    Plan discussed with patient at bedside, all questions answered.    Consultations:   IP CONSULT TO HOSPITALIST  IP CONSULT

## 2024-10-02 NOTE — ED TRIAGE NOTES
Patient her for RLQ abdominal pain that radiates into her left back side , become worse today but has been bothering her for about 1 week   Reports no blood in urine   Describes the pain as throbbing   Reports no nausea or vomiting,   Denies chest pain, or shortness of breath

## 2024-10-03 ENCOUNTER — APPOINTMENT (OUTPATIENT)
Dept: GENERAL RADIOLOGY | Age: 49
DRG: 330 | End: 2024-10-03
Payer: COMMERCIAL

## 2024-10-03 PROBLEM — E53.8 B12 DEFICIENCY: Status: ACTIVE | Noted: 2024-10-03

## 2024-10-03 PROBLEM — D64.9 ANEMIA: Status: ACTIVE | Noted: 2024-10-03

## 2024-10-03 PROBLEM — N20.0 NEPHROLITHIASIS: Status: ACTIVE | Noted: 2024-10-03

## 2024-10-03 PROBLEM — I95.9 HYPOTENSION: Status: ACTIVE | Noted: 2024-10-03

## 2024-10-03 LAB
ANION GAP SERPL CALCULATED.3IONS-SCNC: 10 MMOL/L (ref 9–16)
BASOPHILS # BLD: 0.03 K/UL (ref 0–0.2)
BASOPHILS NFR BLD: 0 % (ref 0–2)
BUN SERPL-MCNC: 6 MG/DL (ref 6–20)
CALCIUM SERPL-MCNC: 7.9 MG/DL (ref 8.6–10.4)
CHLORIDE SERPL-SCNC: 102 MMOL/L (ref 98–107)
CO2 SERPL-SCNC: 22 MMOL/L (ref 20–31)
CREAT SERPL-MCNC: 0.7 MG/DL (ref 0.5–0.9)
CRP SERPL HS-MCNC: 192 MG/L (ref 0–5)
EOSINOPHIL # BLD: <0.03 K/UL (ref 0–0.44)
EOSINOPHILS RELATIVE PERCENT: 0 % (ref 1–4)
ERYTHROCYTE [DISTWIDTH] IN BLOOD BY AUTOMATED COUNT: 19.9 % (ref 11.8–14.4)
FERRITIN SERPL-MCNC: 11 NG/ML (ref 13–150)
GFR, ESTIMATED: >90 ML/MIN/1.73M2
GLUCOSE SERPL-MCNC: 94 MG/DL (ref 74–99)
HCT VFR BLD AUTO: 31.2 % (ref 36.3–47.1)
HGB BLD-MCNC: 9.4 G/DL (ref 11.9–15.1)
IMM GRANULOCYTES # BLD AUTO: 0.08 K/UL (ref 0–0.3)
IMM GRANULOCYTES NFR BLD: 1 %
IRON SATN MFR SERPL: 5 % (ref 20–55)
IRON SERPL-MCNC: 18 UG/DL (ref 37–145)
LYMPHOCYTES NFR BLD: 1.67 K/UL (ref 1.1–3.7)
LYMPHOCYTES RELATIVE PERCENT: 10 % (ref 24–43)
MCH RBC QN AUTO: 24.5 PG (ref 25.2–33.5)
MCHC RBC AUTO-ENTMCNC: 30.1 G/DL (ref 28.4–34.8)
MCV RBC AUTO: 81.3 FL (ref 82.6–102.9)
MICROORGANISM SPEC CULT: NO GROWTH
MONOCYTES NFR BLD: 0.91 K/UL (ref 0.1–1.2)
MONOCYTES NFR BLD: 5 % (ref 3–12)
NEUTROPHILS NFR BLD: 85 % (ref 36–65)
NEUTS SEG NFR BLD: 14.83 K/UL (ref 1.5–8.1)
NRBC BLD-RTO: 0 PER 100 WBC
PLATELET # BLD AUTO: 414 K/UL (ref 138–453)
PMV BLD AUTO: 9.3 FL (ref 8.1–13.5)
POTASSIUM SERPL-SCNC: 3.6 MMOL/L (ref 3.7–5.3)
PROCALCITONIN SERPL-MCNC: 2.04 NG/ML (ref 0–0.09)
RBC # BLD AUTO: 3.84 M/UL (ref 3.95–5.11)
RBC # BLD: ABNORMAL 10*6/UL
SERVICE CMNT-IMP: NORMAL
SODIUM SERPL-SCNC: 134 MMOL/L (ref 136–145)
SPECIMEN DESCRIPTION: NORMAL
TIBC SERPL-MCNC: 362 UG/DL (ref 250–450)
UNSATURATED IRON BINDING CAPACITY: 344 UG/DL (ref 112–347)
WBC OTHER # BLD: 17.5 K/UL (ref 3.5–11.3)

## 2024-10-03 PROCEDURE — 99233 SBSQ HOSP IP/OBS HIGH 50: CPT | Performed by: FAMILY MEDICINE

## 2024-10-03 PROCEDURE — 36415 COLL VENOUS BLD VENIPUNCTURE: CPT

## 2024-10-03 PROCEDURE — 6370000000 HC RX 637 (ALT 250 FOR IP): Performed by: PHYSICIAN ASSISTANT

## 2024-10-03 PROCEDURE — 80048 BASIC METABOLIC PNL TOTAL CA: CPT

## 2024-10-03 PROCEDURE — 86140 C-REACTIVE PROTEIN: CPT

## 2024-10-03 PROCEDURE — 1200000000 HC SEMI PRIVATE

## 2024-10-03 PROCEDURE — 6360000002 HC RX W HCPCS: Performed by: FAMILY MEDICINE

## 2024-10-03 PROCEDURE — 2580000003 HC RX 258: Performed by: FAMILY MEDICINE

## 2024-10-03 PROCEDURE — 2580000003 HC RX 258: Performed by: PHYSICIAN ASSISTANT

## 2024-10-03 PROCEDURE — 6360000002 HC RX W HCPCS: Performed by: PHYSICIAN ASSISTANT

## 2024-10-03 PROCEDURE — 85025 COMPLETE CBC W/AUTO DIFF WBC: CPT

## 2024-10-03 PROCEDURE — 74018 RADEX ABDOMEN 1 VIEW: CPT

## 2024-10-03 PROCEDURE — 84145 PROCALCITONIN (PCT): CPT

## 2024-10-03 RX ORDER — CYANOCOBALAMIN 1000 UG/ML
1000 INJECTION, SOLUTION INTRAMUSCULAR; SUBCUTANEOUS ONCE
Status: COMPLETED | OUTPATIENT
Start: 2024-10-03 | End: 2024-10-03

## 2024-10-03 RX ADMIN — MORPHINE SULFATE 1 MG: 4 INJECTION INTRAVENOUS at 21:49

## 2024-10-03 RX ADMIN — OXYCODONE 10 MG: 5 TABLET ORAL at 00:53

## 2024-10-03 RX ADMIN — OXYCODONE 10 MG: 5 TABLET ORAL at 18:58

## 2024-10-03 RX ADMIN — METRONIDAZOLE 500 MG: 500 INJECTION, SOLUTION INTRAVENOUS at 04:01

## 2024-10-03 RX ADMIN — METRONIDAZOLE 500 MG: 500 INJECTION, SOLUTION INTRAVENOUS at 10:47

## 2024-10-03 RX ADMIN — OXYCODONE 10 MG: 5 TABLET ORAL at 09:33

## 2024-10-03 RX ADMIN — SODIUM CHLORIDE, PRESERVATIVE FREE 10 ML: 5 INJECTION INTRAVENOUS at 20:41

## 2024-10-03 RX ADMIN — PIPERACILLIN AND TAZOBACTAM 3375 MG: 3; .375 INJECTION, POWDER, LYOPHILIZED, FOR SOLUTION INTRAVENOUS at 13:39

## 2024-10-03 RX ADMIN — MORPHINE SULFATE 1 MG: 4 INJECTION INTRAVENOUS at 17:08

## 2024-10-03 RX ADMIN — CYANOCOBALAMIN 1000 MCG: 1000 INJECTION, SOLUTION INTRAMUSCULAR at 13:09

## 2024-10-03 RX ADMIN — CIPROFLOXACIN 400 MG: 2 INJECTION, SOLUTION INTRAVENOUS at 11:03

## 2024-10-03 RX ADMIN — ENOXAPARIN SODIUM 40 MG: 100 INJECTION SUBCUTANEOUS at 09:33

## 2024-10-03 RX ADMIN — PIPERACILLIN AND TAZOBACTAM 3375 MG: 3; .375 INJECTION, POWDER, LYOPHILIZED, FOR SOLUTION INTRAVENOUS at 20:44

## 2024-10-03 RX ADMIN — OXYCODONE 10 MG: 5 TABLET ORAL at 04:56

## 2024-10-03 RX ADMIN — MORPHINE SULFATE 1 MG: 4 INJECTION INTRAVENOUS at 03:50

## 2024-10-03 RX ADMIN — OXYCODONE 10 MG: 5 TABLET ORAL at 14:24

## 2024-10-03 ASSESSMENT — PAIN DESCRIPTION - ONSET
ONSET: ON-GOING

## 2024-10-03 ASSESSMENT — PAIN DESCRIPTION - DESCRIPTORS
DESCRIPTORS: PRESSURE;SHARP
DESCRIPTORS: ACHING
DESCRIPTORS: ACHING;CRAMPING;BURNING
DESCRIPTORS: BURNING;CRAMPING
DESCRIPTORS: SORE
DESCRIPTORS: ACHING;CRAMPING;THROBBING
DESCRIPTORS: SORE;SHARP
DESCRIPTORS: CRAMPING;ACHING;SORE
DESCRIPTORS: CRAMPING;ACHING;SORE
DESCRIPTORS: ACHING;DISCOMFORT
DESCRIPTORS: ACHING;BURNING;CRAMPING
DESCRIPTORS: ACHING;CRAMPING;BURNING

## 2024-10-03 ASSESSMENT — PAIN DESCRIPTION - ORIENTATION
ORIENTATION: RIGHT;LEFT;LOWER
ORIENTATION: RIGHT;LEFT
ORIENTATION: RIGHT;LEFT;LOWER
ORIENTATION: LEFT;RIGHT;LOWER
ORIENTATION: ANTERIOR
ORIENTATION: LOWER;RIGHT;LEFT
ORIENTATION: RIGHT;LEFT;LOWER
ORIENTATION: RIGHT;LOWER;LEFT
ORIENTATION: RIGHT;LEFT;LOWER
ORIENTATION: RIGHT;ANTERIOR
ORIENTATION: RIGHT;LEFT;LOWER
ORIENTATION: ANTERIOR

## 2024-10-03 ASSESSMENT — PAIN DESCRIPTION - DIRECTION
RADIATING_TOWARDS: BACK

## 2024-10-03 ASSESSMENT — PAIN SCALES - GENERAL
PAINLEVEL_OUTOF10: 8
PAINLEVEL_OUTOF10: 0
PAINLEVEL_OUTOF10: 2
PAINLEVEL_OUTOF10: 10
PAINLEVEL_OUTOF10: 8
PAINLEVEL_OUTOF10: 10
PAINLEVEL_OUTOF10: 7
PAINLEVEL_OUTOF10: 5
PAINLEVEL_OUTOF10: 7
PAINLEVEL_OUTOF10: 9
PAINLEVEL_OUTOF10: 10
PAINLEVEL_OUTOF10: 8
PAINLEVEL_OUTOF10: 0
PAINLEVEL_OUTOF10: 8
PAINLEVEL_OUTOF10: 10
PAINLEVEL_OUTOF10: 10
PAINLEVEL_OUTOF10: 9
PAINLEVEL_OUTOF10: 8

## 2024-10-03 ASSESSMENT — PAIN - FUNCTIONAL ASSESSMENT
PAIN_FUNCTIONAL_ASSESSMENT: PREVENTS OR INTERFERES SOME ACTIVE ACTIVITIES AND ADLS
PAIN_FUNCTIONAL_ASSESSMENT: ACTIVITIES ARE NOT PREVENTED
PAIN_FUNCTIONAL_ASSESSMENT: PREVENTS OR INTERFERES SOME ACTIVE ACTIVITIES AND ADLS
PAIN_FUNCTIONAL_ASSESSMENT: ACTIVITIES ARE NOT PREVENTED

## 2024-10-03 ASSESSMENT — PAIN DESCRIPTION - FREQUENCY
FREQUENCY: CONTINUOUS

## 2024-10-03 ASSESSMENT — PAIN DESCRIPTION - PAIN TYPE
TYPE: ACUTE PAIN

## 2024-10-03 ASSESSMENT — PAIN DESCRIPTION - LOCATION
LOCATION: ABDOMEN;BACK
LOCATION: ABDOMEN
LOCATION: ABDOMEN;BACK
LOCATION: ABDOMEN
LOCATION: ABDOMEN;BREAST
LOCATION: ABDOMEN
LOCATION: ABDOMEN;BACK
LOCATION: ABDOMEN;BACK

## 2024-10-03 NOTE — CARE COORDINATION
Case Management Assessment  Initial Evaluation    Date/Time of Evaluation: 10/3/2024 11:22 AM  Assessment Completed by: REA YOUNG    If patient is discharged prior to next notation, then this note serves as note for discharge by case management.    Patient Name: Eunice Ortega                   YOB: 1975  Diagnosis: Sigmoid diverticulitis [K57.32]  Acute cystitis without hematuria [N30.00]  Acute diverticulitis [K57.92]                   Date / Time: 10/2/2024  2:41 PM    Patient Admission Status: Inpatient   Readmission Risk (Low < 19, Mod (19-27), High > 27): Readmission Risk Score: 7.5    Current PCP: No primary care provider on file.  PCP verified by CM? (P) No (pt does not have one, PCP provided to pt)    Chart Reviewed: Yes      History Provided by: (P) Patient  Patient Orientation: (P) Alert and Oriented, Person, Place, Situation, Self    Patient Cognition: (P) Alert    Hospitalization in the last 30 days (Readmission):  No    If yes, Readmission Assessment in  Navigator will be completed.    Advance Directives:      Code Status: Full Code   Patient's Primary Decision Maker is: (P) Legal Next of Kin      Discharge Planning:    Patient lives with: (P) Alone Type of Home: (P) House  Primary Care Giver: (P) Self  Patient Support Systems include: (P) Children, Family Members, Parent   Current Financial resources: (P) None  Current community resources: (P) None  Current services prior to admission: (P) None            Current DME:              Type of Home Care services:  (P) None    ADLS  Prior functional level: (P) Independent in ADLs/IADLs  Current functional level: (P) Independent in ADLs/IADLs    PT AM-PAC:   /24  OT AM-PAC:   /24    Family can provide assistance at DC: (P) Yes  Would you like Case Management to discuss the discharge plan with any other family members/significant others, and if so, who? (P) No  Plans to Return to Present Housing: (P) Yes  Other Identified

## 2024-10-03 NOTE — ED NOTES
Pt to 4C via ED tech, via stretcher, family at bedside with all patient belongings, on RA.     Cipro sent up with patient.   Patient given oral PRN pain medication prior to transfer to .     T2R printed, signed, and sent with tech to floor.     
The following labs were labeled with appropriate pt sticker and tubed to lab:     [] Blue     [] Lavender   [] on ice  [] Green/yellow  [] Green/black [] on ice  [] Grey  [] on ice  [] Yellow  [] Red  [] Pink  [] Type/ Screen  [] ABG  [] VBG    [] COVID-19 swab    [] Rapid  [] PCR  [] Flu swab  [] Peds Viral Panel     [] Urine Sample  [] Fecal Sample  [] Pelvic Cultures  [x] Blood Cultures  [x] X 2 set  [] STREP Cultures  [] Wound Cultures    
The following labs were labeled with appropriate pt sticker and tubed to lab:     [] Blue     [] Lavender   [] on ice  [] Green/yellow  [] Green/black [] on ice  [] Grey  [] on ice  [] Yellow  [] Red  [] Pink  [] Type/ Screen  [] ABG  [] VBG    [] COVID-19 swab    [] Rapid  [] PCR  [] Flu swab  [] Peds Viral Panel     [x] Urine Sample  [] Fecal Sample  [] Pelvic Cultures  [] Blood Cultures  [] X 2  [] STREP Cultures  [] Wound Cultures    
The following labs were labeled with appropriate pt sticker and tubed to lab:     [x] Blue     [x] Lavender   [] on ice  [x] Green/yellow  [x] Green/black [] on ice  [] Grey  [] on ice  [] Yellow  [] Red  [] Pink  [] Type/ Screen  [] ABG  [] VBG    [] COVID-19 swab    [] Rapid  [] PCR  [] Flu swab  [] Peds Viral Panel     [] Urine Sample  [] Fecal Sample  [] Pelvic Cultures  [x] Blood Cultures  [x] X 1  [] STREP Cultures  [] Wound Cultures    
MICROSCOPIC - Abnormal; Notable for the following components:    Turbidity UA Turbid (*)     pH, Urine >9.0 (*)     Protein, UA NEGATIVE  Verified by sulfosalicylic acid test. (*)     Leukocyte Esterase, Urine MODERATE (*)     Crystals, UA TRIPLE PHOSPHATE (*)     Crystals, UA FEW (*)     Bacteria, UA MODERATE (*)     All other components within normal limits   CULTURE, BLOOD 1   CULTURE, BLOOD 1   CULTURE, URINE   BASIC METABOLIC PANEL   PREGNANCY, URINE   LACTATE, SEPSIS   PROTIME-INR   APTT   HCG, SERUM, QUALITATIVE   VITAMIN B12 & FOLATE   IRON AND TIBC   FERRITIN       Electronically signed by ROSETTA SULTANA RN on 10/2/2024 at 7:12 PM

## 2024-10-04 ENCOUNTER — ANESTHESIA EVENT (OUTPATIENT)
Dept: OPERATING ROOM | Age: 49
End: 2024-10-04
Payer: COMMERCIAL

## 2024-10-04 ENCOUNTER — APPOINTMENT (OUTPATIENT)
Dept: GENERAL RADIOLOGY | Age: 49
DRG: 330 | End: 2024-10-04
Payer: COMMERCIAL

## 2024-10-04 ENCOUNTER — APPOINTMENT (OUTPATIENT)
Dept: CT IMAGING | Age: 49
DRG: 330 | End: 2024-10-04
Payer: COMMERCIAL

## 2024-10-04 ENCOUNTER — ANESTHESIA (OUTPATIENT)
Dept: OPERATING ROOM | Age: 49
End: 2024-10-04
Payer: COMMERCIAL

## 2024-10-04 PROBLEM — E53.8 B12 DEFICIENCY: Status: RESOLVED | Noted: 2024-10-03 | Resolved: 2024-10-04

## 2024-10-04 PROBLEM — N20.0 NEPHROLITHIASIS: Status: RESOLVED | Noted: 2024-10-03 | Resolved: 2024-10-04

## 2024-10-04 PROBLEM — E87.6 HYPOKALEMIA: Status: ACTIVE | Noted: 2024-10-04

## 2024-10-04 LAB
ABO + RH BLD: NORMAL
ANION GAP SERPL CALCULATED.3IONS-SCNC: 13 MMOL/L (ref 9–16)
ARM BAND NUMBER: NORMAL
BASOPHILS # BLD: 0.04 K/UL (ref 0–0.2)
BASOPHILS NFR BLD: 0 % (ref 0–2)
BLOOD BANK SAMPLE EXPIRATION: NORMAL
BLOOD GROUP ANTIBODIES SERPL: NEGATIVE
BUN SERPL-MCNC: 6 MG/DL (ref 6–20)
CALCIUM SERPL-MCNC: 8.2 MG/DL (ref 8.6–10.4)
CHLORIDE SERPL-SCNC: 101 MMOL/L (ref 98–107)
CO2 SERPL-SCNC: 20 MMOL/L (ref 20–31)
CREAT SERPL-MCNC: 0.8 MG/DL (ref 0.5–0.9)
EOSINOPHIL # BLD: 0.07 K/UL (ref 0–0.44)
EOSINOPHILS RELATIVE PERCENT: 0 % (ref 1–4)
ERYTHROCYTE [DISTWIDTH] IN BLOOD BY AUTOMATED COUNT: 19.5 % (ref 11.8–14.4)
GFR, ESTIMATED: >90 ML/MIN/1.73M2
GLUCOSE SERPL-MCNC: 97 MG/DL (ref 74–99)
HCT VFR BLD AUTO: 31.4 % (ref 36.3–47.1)
HGB BLD-MCNC: 9.4 G/DL (ref 11.9–15.1)
IMM GRANULOCYTES # BLD AUTO: 0.13 K/UL (ref 0–0.3)
IMM GRANULOCYTES NFR BLD: 1 %
LACTIC ACID, WHOLE BLOOD: 0.7 MMOL/L (ref 0.7–2.1)
LYMPHOCYTES NFR BLD: 1.08 K/UL (ref 1.1–3.7)
LYMPHOCYTES RELATIVE PERCENT: 5 % (ref 24–43)
MCH RBC QN AUTO: 24.3 PG (ref 25.2–33.5)
MCHC RBC AUTO-ENTMCNC: 29.9 G/DL (ref 28.4–34.8)
MCV RBC AUTO: 81.1 FL (ref 82.6–102.9)
MONOCYTES NFR BLD: 1.05 K/UL (ref 0.1–1.2)
MONOCYTES NFR BLD: 5 % (ref 3–12)
NEUTROPHILS NFR BLD: 89 % (ref 36–65)
NEUTS SEG NFR BLD: 18.76 K/UL (ref 1.5–8.1)
NRBC BLD-RTO: 0 PER 100 WBC
PLATELET # BLD AUTO: 427 K/UL (ref 138–453)
PMV BLD AUTO: 9 FL (ref 8.1–13.5)
POTASSIUM SERPL-SCNC: 3.6 MMOL/L (ref 3.7–5.3)
RBC # BLD AUTO: 3.87 M/UL (ref 3.95–5.11)
RBC # BLD: ABNORMAL 10*6/UL
SODIUM SERPL-SCNC: 134 MMOL/L (ref 136–145)
WBC OTHER # BLD: 21.1 K/UL (ref 3.5–11.3)

## 2024-10-04 PROCEDURE — 2580000003 HC RX 258: Performed by: SURGERY

## 2024-10-04 PROCEDURE — 99232 SBSQ HOSP IP/OBS MODERATE 35: CPT | Performed by: STUDENT IN AN ORGANIZED HEALTH CARE EDUCATION/TRAINING PROGRAM

## 2024-10-04 PROCEDURE — 6360000002 HC RX W HCPCS: Performed by: PHYSICIAN ASSISTANT

## 2024-10-04 PROCEDURE — 6370000000 HC RX 637 (ALT 250 FOR IP): Performed by: PHYSICIAN ASSISTANT

## 2024-10-04 PROCEDURE — 83605 ASSAY OF LACTIC ACID: CPT

## 2024-10-04 PROCEDURE — 1200000000 HC SEMI PRIVATE

## 2024-10-04 PROCEDURE — 7100000001 HC PACU RECOVERY - ADDTL 15 MIN: Performed by: SURGERY

## 2024-10-04 PROCEDURE — 2580000003 HC RX 258: Performed by: FAMILY MEDICINE

## 2024-10-04 PROCEDURE — 88304 TISSUE EXAM BY PATHOLOGIST: CPT

## 2024-10-04 PROCEDURE — 6360000002 HC RX W HCPCS

## 2024-10-04 PROCEDURE — 6360000004 HC RX CONTRAST MEDICATION: Performed by: STUDENT IN AN ORGANIZED HEALTH CARE EDUCATION/TRAINING PROGRAM

## 2024-10-04 PROCEDURE — 2500000003 HC RX 250 WO HCPCS

## 2024-10-04 PROCEDURE — 88307 TISSUE EXAM BY PATHOLOGIST: CPT

## 2024-10-04 PROCEDURE — 6360000002 HC RX W HCPCS: Performed by: FAMILY MEDICINE

## 2024-10-04 PROCEDURE — 2580000003 HC RX 258: Performed by: STUDENT IN AN ORGANIZED HEALTH CARE EDUCATION/TRAINING PROGRAM

## 2024-10-04 PROCEDURE — 3700000001 HC ADD 15 MINUTES (ANESTHESIA): Performed by: SURGERY

## 2024-10-04 PROCEDURE — 86850 RBC ANTIBODY SCREEN: CPT

## 2024-10-04 PROCEDURE — 80048 BASIC METABOLIC PNL TOTAL CA: CPT

## 2024-10-04 PROCEDURE — 2580000003 HC RX 258: Performed by: PHYSICIAN ASSISTANT

## 2024-10-04 PROCEDURE — 85025 COMPLETE CBC W/AUTO DIFF WBC: CPT

## 2024-10-04 PROCEDURE — 6360000002 HC RX W HCPCS: Performed by: ANESTHESIOLOGY

## 2024-10-04 PROCEDURE — 0DTJ4ZZ RESECTION OF APPENDIX, PERCUTANEOUS ENDOSCOPIC APPROACH: ICD-10-PCS | Performed by: STUDENT IN AN ORGANIZED HEALTH CARE EDUCATION/TRAINING PROGRAM

## 2024-10-04 PROCEDURE — 3600000015 HC SURGERY LEVEL 5 ADDTL 15MIN: Performed by: SURGERY

## 2024-10-04 PROCEDURE — 74177 CT ABD & PELVIS W/CONTRAST: CPT

## 2024-10-04 PROCEDURE — 2720000010 HC SURG SUPPLY STERILE: Performed by: SURGERY

## 2024-10-04 PROCEDURE — 71045 X-RAY EXAM CHEST 1 VIEW: CPT

## 2024-10-04 PROCEDURE — 2580000003 HC RX 258

## 2024-10-04 PROCEDURE — 36415 COLL VENOUS BLD VENIPUNCTURE: CPT

## 2024-10-04 PROCEDURE — 2709999900 HC NON-CHARGEABLE SUPPLY: Performed by: SURGERY

## 2024-10-04 PROCEDURE — 6360000002 HC RX W HCPCS: Performed by: STUDENT IN AN ORGANIZED HEALTH CARE EDUCATION/TRAINING PROGRAM

## 2024-10-04 PROCEDURE — 0D1M0Z4 BYPASS DESCENDING COLON TO CUTANEOUS, OPEN APPROACH: ICD-10-PCS | Performed by: STUDENT IN AN ORGANIZED HEALTH CARE EDUCATION/TRAINING PROGRAM

## 2024-10-04 PROCEDURE — 0DTN0ZZ RESECTION OF SIGMOID COLON, OPEN APPROACH: ICD-10-PCS | Performed by: STUDENT IN AN ORGANIZED HEALTH CARE EDUCATION/TRAINING PROGRAM

## 2024-10-04 PROCEDURE — 6370000000 HC RX 637 (ALT 250 FOR IP): Performed by: STUDENT IN AN ORGANIZED HEALTH CARE EDUCATION/TRAINING PROGRAM

## 2024-10-04 PROCEDURE — 7100000000 HC PACU RECOVERY - FIRST 15 MIN: Performed by: SURGERY

## 2024-10-04 PROCEDURE — 3700000000 HC ANESTHESIA ATTENDED CARE: Performed by: SURGERY

## 2024-10-04 PROCEDURE — 3600000005 HC SURGERY LEVEL 5 BASE: Performed by: SURGERY

## 2024-10-04 PROCEDURE — 86900 BLOOD TYPING SEROLOGIC ABO: CPT

## 2024-10-04 PROCEDURE — 86901 BLOOD TYPING SEROLOGIC RH(D): CPT

## 2024-10-04 RX ORDER — SUCRALFATE 1 G/1
1 TABLET ORAL 3 TIMES DAILY
Status: DISCONTINUED | OUTPATIENT
Start: 2024-10-04 | End: 2024-10-05

## 2024-10-04 RX ORDER — LABETALOL HYDROCHLORIDE 5 MG/ML
10 INJECTION, SOLUTION INTRAVENOUS
Status: DISCONTINUED | OUTPATIENT
Start: 2024-10-04 | End: 2024-10-05 | Stop reason: HOSPADM

## 2024-10-04 RX ORDER — IOPAMIDOL 755 MG/ML
75 INJECTION, SOLUTION INTRAVASCULAR
Status: COMPLETED | OUTPATIENT
Start: 2024-10-04 | End: 2024-10-04

## 2024-10-04 RX ORDER — SODIUM CHLORIDE, SODIUM LACTATE, POTASSIUM CHLORIDE, CALCIUM CHLORIDE 600; 310; 30; 20 MG/100ML; MG/100ML; MG/100ML; MG/100ML
INJECTION, SOLUTION INTRAVENOUS
Status: DISCONTINUED | OUTPATIENT
Start: 2024-10-04 | End: 2024-10-04 | Stop reason: SDUPTHER

## 2024-10-04 RX ORDER — MAGNESIUM HYDROXIDE 1200 MG/15ML
LIQUID ORAL CONTINUOUS PRN
Status: COMPLETED | OUTPATIENT
Start: 2024-10-04 | End: 2024-10-04

## 2024-10-04 RX ORDER — ROCURONIUM BROMIDE 10 MG/ML
INJECTION, SOLUTION INTRAVENOUS
Status: DISCONTINUED | OUTPATIENT
Start: 2024-10-04 | End: 2024-10-04 | Stop reason: SDUPTHER

## 2024-10-04 RX ORDER — LIDOCAINE HYDROCHLORIDE 10 MG/ML
INJECTION, SOLUTION EPIDURAL; INFILTRATION; INTRACAUDAL; PERINEURAL
Status: DISCONTINUED | OUTPATIENT
Start: 2024-10-04 | End: 2024-10-04 | Stop reason: SDUPTHER

## 2024-10-04 RX ORDER — PIPERACILLIN SODIUM, TAZOBACTAM SODIUM 3; .375 G/15ML; G/15ML
INJECTION, POWDER, LYOPHILIZED, FOR SOLUTION INTRAVENOUS
Status: DISCONTINUED | OUTPATIENT
Start: 2024-10-04 | End: 2024-10-04 | Stop reason: SDUPTHER

## 2024-10-04 RX ORDER — NALOXONE HYDROCHLORIDE 0.4 MG/ML
INJECTION, SOLUTION INTRAMUSCULAR; INTRAVENOUS; SUBCUTANEOUS PRN
Status: DISCONTINUED | OUTPATIENT
Start: 2024-10-04 | End: 2024-10-05 | Stop reason: HOSPADM

## 2024-10-04 RX ORDER — MIDAZOLAM HYDROCHLORIDE 1 MG/ML
INJECTION INTRAMUSCULAR; INTRAVENOUS
Status: DISCONTINUED | OUTPATIENT
Start: 2024-10-04 | End: 2024-10-04 | Stop reason: SDUPTHER

## 2024-10-04 RX ORDER — ONDANSETRON 2 MG/ML
INJECTION INTRAMUSCULAR; INTRAVENOUS
Status: DISCONTINUED | OUTPATIENT
Start: 2024-10-04 | End: 2024-10-04 | Stop reason: SDUPTHER

## 2024-10-04 RX ORDER — POTASSIUM CHLORIDE 1500 MG/1
40 TABLET, EXTENDED RELEASE ORAL ONCE
Status: COMPLETED | OUTPATIENT
Start: 2024-10-04 | End: 2024-10-04

## 2024-10-04 RX ORDER — HYDRALAZINE HYDROCHLORIDE 20 MG/ML
10 INJECTION INTRAMUSCULAR; INTRAVENOUS
Status: DISCONTINUED | OUTPATIENT
Start: 2024-10-04 | End: 2024-10-05 | Stop reason: HOSPADM

## 2024-10-04 RX ORDER — ONDANSETRON 2 MG/ML
4 INJECTION INTRAMUSCULAR; INTRAVENOUS
Status: DISCONTINUED | OUTPATIENT
Start: 2024-10-04 | End: 2024-10-05 | Stop reason: HOSPADM

## 2024-10-04 RX ORDER — PROPOFOL 10 MG/ML
INJECTION, EMULSION INTRAVENOUS
Status: DISCONTINUED | OUTPATIENT
Start: 2024-10-04 | End: 2024-10-04 | Stop reason: SDUPTHER

## 2024-10-04 RX ORDER — SUCCINYLCHOLINE/SOD CL,ISO/PF 100 MG/5ML
SYRINGE (ML) INTRAVENOUS
Status: DISCONTINUED | OUTPATIENT
Start: 2024-10-04 | End: 2024-10-04 | Stop reason: SDUPTHER

## 2024-10-04 RX ORDER — DEXAMETHASONE SODIUM PHOSPHATE 10 MG/ML
INJECTION, SOLUTION INTRAMUSCULAR; INTRAVENOUS
Status: DISCONTINUED | OUTPATIENT
Start: 2024-10-04 | End: 2024-10-04 | Stop reason: SDUPTHER

## 2024-10-04 RX ORDER — SODIUM CHLORIDE 0.9 % (FLUSH) 0.9 %
5-40 SYRINGE (ML) INJECTION PRN
Status: DISCONTINUED | OUTPATIENT
Start: 2024-10-04 | End: 2024-10-05 | Stop reason: HOSPADM

## 2024-10-04 RX ORDER — SODIUM CHLORIDE 9 MG/ML
INJECTION, SOLUTION INTRAVENOUS PRN
Status: DISCONTINUED | OUTPATIENT
Start: 2024-10-04 | End: 2024-10-05 | Stop reason: HOSPADM

## 2024-10-04 RX ORDER — SODIUM CHLORIDE 0.9 % (FLUSH) 0.9 %
5-40 SYRINGE (ML) INJECTION EVERY 12 HOURS SCHEDULED
Status: DISCONTINUED | OUTPATIENT
Start: 2024-10-04 | End: 2024-10-05 | Stop reason: HOSPADM

## 2024-10-04 RX ORDER — PHENYLEPHRINE HCL IN 0.9% NACL 1 MG/10 ML
SYRINGE (ML) INTRAVENOUS
Status: DISCONTINUED | OUTPATIENT
Start: 2024-10-04 | End: 2024-10-04 | Stop reason: SDUPTHER

## 2024-10-04 RX ORDER — FENTANYL CITRATE 50 UG/ML
INJECTION, SOLUTION INTRAMUSCULAR; INTRAVENOUS
Status: DISCONTINUED | OUTPATIENT
Start: 2024-10-04 | End: 2024-10-04 | Stop reason: SDUPTHER

## 2024-10-04 RX ORDER — KETOROLAC TROMETHAMINE 30 MG/ML
INJECTION, SOLUTION INTRAMUSCULAR; INTRAVENOUS
Status: DISCONTINUED | OUTPATIENT
Start: 2024-10-04 | End: 2024-10-04 | Stop reason: SDUPTHER

## 2024-10-04 RX ADMIN — PIPERACILLIN AND TAZOBACTAM 3375 MG: 3; .375 INJECTION, POWDER, LYOPHILIZED, FOR SOLUTION INTRAVENOUS at 04:29

## 2024-10-04 RX ADMIN — FENTANYL CITRATE 50 MCG: 50 INJECTION, SOLUTION INTRAMUSCULAR; INTRAVENOUS at 19:48

## 2024-10-04 RX ADMIN — IOPAMIDOL 75 ML: 755 INJECTION, SOLUTION INTRAVENOUS at 14:39

## 2024-10-04 RX ADMIN — FENTANYL CITRATE 25 MCG: 50 INJECTION, SOLUTION INTRAMUSCULAR; INTRAVENOUS at 20:21

## 2024-10-04 RX ADMIN — ROCURONIUM BROMIDE 10 MG: 10 INJECTION, SOLUTION INTRAVENOUS at 21:27

## 2024-10-04 RX ADMIN — ONDANSETRON 4 MG: 2 INJECTION INTRAMUSCULAR; INTRAVENOUS at 22:49

## 2024-10-04 RX ADMIN — IOHEXOL 50 ML: 240 INJECTION, SOLUTION INTRATHECAL; INTRAVASCULAR; INTRAVENOUS; ORAL at 13:11

## 2024-10-04 RX ADMIN — PIPERACILLIN AND TAZOBACTAM 3.38 G: 3; .375 INJECTION, POWDER, LYOPHILIZED, FOR SOLUTION INTRAVENOUS at 20:07

## 2024-10-04 RX ADMIN — MIDAZOLAM 2 MG: 1 INJECTION INTRAMUSCULAR; INTRAVENOUS at 19:44

## 2024-10-04 RX ADMIN — PROPOFOL 130 MG: 10 INJECTION, EMULSION INTRAVENOUS at 19:48

## 2024-10-04 RX ADMIN — DEXAMETHASONE SODIUM PHOSPHATE 10 MG: 10 INJECTION INTRAMUSCULAR; INTRAVENOUS at 19:55

## 2024-10-04 RX ADMIN — OXYCODONE 10 MG: 5 TABLET ORAL at 11:03

## 2024-10-04 RX ADMIN — HYDROMORPHONE HYDROCHLORIDE 0.5 MG: 1 INJECTION, SOLUTION INTRAMUSCULAR; INTRAVENOUS; SUBCUTANEOUS at 23:26

## 2024-10-04 RX ADMIN — SODIUM CHLORIDE, POTASSIUM CHLORIDE, SODIUM LACTATE AND CALCIUM CHLORIDE: 600; 310; 30; 20 INJECTION, SOLUTION INTRAVENOUS at 22:34

## 2024-10-04 RX ADMIN — KETOROLAC TROMETHAMINE 30 MG: 30 INJECTION, SOLUTION INTRAMUSCULAR; INTRAVENOUS at 22:49

## 2024-10-04 RX ADMIN — HYDROMORPHONE HYDROCHLORIDE 0.5 MG: 1 INJECTION, SOLUTION INTRAMUSCULAR; INTRAVENOUS; SUBCUTANEOUS at 23:18

## 2024-10-04 RX ADMIN — PIPERACILLIN AND TAZOBACTAM 3375 MG: 3; .375 INJECTION, POWDER, LYOPHILIZED, FOR SOLUTION INTRAVENOUS at 12:11

## 2024-10-04 RX ADMIN — OXYCODONE 10 MG: 5 TABLET ORAL at 00:34

## 2024-10-04 RX ADMIN — Medication 75 MG: at 19:48

## 2024-10-04 RX ADMIN — HYDROMORPHONE HYDROCHLORIDE 0.5 MG: 1 INJECTION, SOLUTION INTRAMUSCULAR; INTRAVENOUS; SUBCUTANEOUS at 23:20

## 2024-10-04 RX ADMIN — POTASSIUM CHLORIDE 40 MEQ: 1500 TABLET, EXTENDED RELEASE ORAL at 11:01

## 2024-10-04 RX ADMIN — IOHEXOL 50 ML: 240 INJECTION, SOLUTION INTRATHECAL; INTRAVASCULAR; INTRAVENOUS; ORAL at 14:39

## 2024-10-04 RX ADMIN — Medication 100 MCG: at 22:12

## 2024-10-04 RX ADMIN — SODIUM CHLORIDE, POTASSIUM CHLORIDE, SODIUM LACTATE AND CALCIUM CHLORIDE: 600; 310; 30; 20 INJECTION, SOLUTION INTRAVENOUS at 19:48

## 2024-10-04 RX ADMIN — HYDROMORPHONE HYDROCHLORIDE 0.25 MG: 1 INJECTION, SOLUTION INTRAMUSCULAR; INTRAVENOUS; SUBCUTANEOUS at 23:50

## 2024-10-04 RX ADMIN — ACETAMINOPHEN 650 MG: 325 TABLET ORAL at 00:34

## 2024-10-04 RX ADMIN — SODIUM CHLORIDE, PRESERVATIVE FREE 40 MG: 5 INJECTION INTRAVENOUS at 11:02

## 2024-10-04 RX ADMIN — SODIUM CHLORIDE, PRESERVATIVE FREE 10 ML: 5 INJECTION INTRAVENOUS at 08:57

## 2024-10-04 RX ADMIN — ENOXAPARIN SODIUM 40 MG: 100 INJECTION SUBCUTANEOUS at 08:57

## 2024-10-04 RX ADMIN — OXYCODONE 10 MG: 5 TABLET ORAL at 04:32

## 2024-10-04 RX ADMIN — SODIUM CHLORIDE: 9 INJECTION, SOLUTION INTRAVENOUS at 04:28

## 2024-10-04 RX ADMIN — SUGAMMADEX 200 MG: 100 INJECTION, SOLUTION INTRAVENOUS at 23:11

## 2024-10-04 RX ADMIN — MORPHINE SULFATE 1 MG: 4 INJECTION INTRAVENOUS at 17:07

## 2024-10-04 RX ADMIN — ONDANSETRON 4 MG: 2 INJECTION INTRAMUSCULAR; INTRAVENOUS at 14:28

## 2024-10-04 RX ADMIN — FENTANYL CITRATE 25 MCG: 50 INJECTION, SOLUTION INTRAMUSCULAR; INTRAVENOUS at 23:10

## 2024-10-04 RX ADMIN — SODIUM CHLORIDE, POTASSIUM CHLORIDE, SODIUM LACTATE AND CALCIUM CHLORIDE: 600; 310; 30; 20 INJECTION, SOLUTION INTRAVENOUS at 21:35

## 2024-10-04 RX ADMIN — ROCURONIUM BROMIDE 50 MG: 10 INJECTION, SOLUTION INTRAVENOUS at 20:00

## 2024-10-04 RX ADMIN — ROCURONIUM BROMIDE 20 MG: 10 INJECTION, SOLUTION INTRAVENOUS at 20:29

## 2024-10-04 RX ADMIN — ROCURONIUM BROMIDE 20 MG: 10 INJECTION, SOLUTION INTRAVENOUS at 21:04

## 2024-10-04 RX ADMIN — OXYCODONE 10 MG: 5 TABLET ORAL at 15:30

## 2024-10-04 RX ADMIN — HYDROMORPHONE HYDROCHLORIDE 0.5 MG: 1 INJECTION, SOLUTION INTRAMUSCULAR; INTRAVENOUS; SUBCUTANEOUS at 23:36

## 2024-10-04 RX ADMIN — LIDOCAINE HYDROCHLORIDE 50 MG: 10 INJECTION, SOLUTION EPIDURAL; INFILTRATION; INTRACAUDAL; PERINEURAL at 19:48

## 2024-10-04 RX ADMIN — MORPHINE SULFATE 1 MG: 4 INJECTION INTRAVENOUS at 02:11

## 2024-10-04 RX ADMIN — HYDROMORPHONE HYDROCHLORIDE 0.25 MG: 1 INJECTION, SOLUTION INTRAMUSCULAR; INTRAVENOUS; SUBCUTANEOUS at 23:44

## 2024-10-04 ASSESSMENT — PAIN DESCRIPTION - PAIN TYPE
TYPE: SURGICAL PAIN
TYPE: ACUTE PAIN
TYPE: SURGICAL PAIN

## 2024-10-04 ASSESSMENT — PAIN DESCRIPTION - LOCATION
LOCATION: ABDOMEN

## 2024-10-04 ASSESSMENT — PAIN SCALES - GENERAL
PAINLEVEL_OUTOF10: 10
PAINLEVEL_OUTOF10: 6
PAINLEVEL_OUTOF10: 6
PAINLEVEL_OUTOF10: 10
PAINLEVEL_OUTOF10: 10

## 2024-10-04 ASSESSMENT — PAIN DESCRIPTION - ONSET
ONSET: ON-GOING

## 2024-10-04 ASSESSMENT — PAIN DESCRIPTION - DESCRIPTORS
DESCRIPTORS: BURNING;SORE
DESCRIPTORS: CRUSHING;DISCOMFORT
DESCRIPTORS: ACHING;DISCOMFORT
DESCRIPTORS: ACHING;BURNING;SORE;TENDER
DESCRIPTORS: CRAMPING;DISCOMFORT
DESCRIPTORS: SORE;TENDER;DISCOMFORT
DESCRIPTORS: SORE;TENDER;DISCOMFORT;BURNING
DESCRIPTORS: CRAMPING;DISCOMFORT
DESCRIPTORS: ACHING;DISCOMFORT

## 2024-10-04 ASSESSMENT — PAIN DESCRIPTION - FREQUENCY
FREQUENCY: INTERMITTENT
FREQUENCY: INTERMITTENT
FREQUENCY: CONTINUOUS
FREQUENCY: CONTINUOUS
FREQUENCY: INTERMITTENT
FREQUENCY: INTERMITTENT

## 2024-10-04 ASSESSMENT — PAIN DESCRIPTION - DIRECTION
RADIATING_TOWARDS: BACK
RADIATING_TOWARDS: BACK

## 2024-10-04 ASSESSMENT — PAIN - FUNCTIONAL ASSESSMENT
PAIN_FUNCTIONAL_ASSESSMENT: ACTIVITIES ARE NOT PREVENTED
PAIN_FUNCTIONAL_ASSESSMENT: PREVENTS OR INTERFERES SOME ACTIVE ACTIVITIES AND ADLS
PAIN_FUNCTIONAL_ASSESSMENT: ACTIVITIES ARE NOT PREVENTED
PAIN_FUNCTIONAL_ASSESSMENT: PREVENTS OR INTERFERES SOME ACTIVE ACTIVITIES AND ADLS

## 2024-10-04 ASSESSMENT — PAIN DESCRIPTION - ORIENTATION
ORIENTATION: MID
ORIENTATION: MID;LOWER
ORIENTATION: RIGHT;LEFT
ORIENTATION: MID
ORIENTATION: RIGHT;LEFT
ORIENTATION: MID

## 2024-10-04 NOTE — ANESTHESIA PRE PROCEDURE
Answered      Vital Signs (Current):   Vitals:    10/04/24 0502 10/04/24 0810 10/04/24 1136 10/04/24 1546   BP:  120/73 107/63 (!) 111/100   Pulse:  88 82 80   Resp: 18 20 18 18   Temp:  98.6 °F (37 °C) 98.4 °F (36.9 °C) 99.1 °F (37.3 °C)   TempSrc:  Oral Oral Oral   SpO2:  98% 100% 98%   Weight:       Height:                                                  BP Readings from Last 3 Encounters:   10/04/24 (!) 111/100   06/05/24 107/70   12/02/22 114/72       NPO Status:                                                                                 BMI:   Wt Readings from Last 3 Encounters:   10/02/24 66.5 kg (146 lb 9.7 oz)   06/04/24 67.1 kg (148 lb)   11/18/22 63.5 kg (140 lb)     Body mass index is 25.97 kg/m².    CBC:   Lab Results   Component Value Date/Time    WBC 21.1 10/04/2024 11:35 AM    RBC 3.87 10/04/2024 11:35 AM    HGB 9.4 10/04/2024 11:35 AM    HCT 31.4 10/04/2024 11:35 AM    MCV 81.1 10/04/2024 11:35 AM    RDW 19.5 10/04/2024 11:35 AM     10/04/2024 11:35 AM       CMP:   Lab Results   Component Value Date/Time     10/04/2024 07:54 AM    K 3.6 10/04/2024 07:54 AM     10/04/2024 07:54 AM    CO2 20 10/04/2024 07:54 AM    BUN 6 10/04/2024 07:54 AM    CREATININE 0.8 10/04/2024 07:54 AM    LABGLOM >90 10/04/2024 07:54 AM    GLUCOSE 97 10/04/2024 07:54 AM    CALCIUM 8.2 10/04/2024 07:54 AM       POC Tests: No results for input(s): \"POCGLU\", \"POCNA\", \"POCK\", \"POCCL\", \"POCBUN\", \"POCHEMO\", \"POCHCT\" in the last 72 hours.    Coags:   Lab Results   Component Value Date/Time    PROTIME 13.0 10/02/2024 03:23 PM    INR 1.0 10/02/2024 03:23 PM    APTT 27.4 10/02/2024 03:23 PM       HCG (If Applicable):   Lab Results   Component Value Date    PREGTESTUR NEGATIVE 10/02/2024    PREGSERUM NEGATIVE 10/02/2024        ABGs: No results found for: \"PHART\", \"PO2ART\", \"JTX3FZF\", \"EYY6VYQ\", \"BEART\", \"A7DVGYIK\"     Type & Screen (If Applicable):  Lab Results   Component Value Date    ABORH O POSITIVE 10/04/2024

## 2024-10-05 PROBLEM — K57.32 SIGMOID DIVERTICULITIS: Status: ACTIVE | Noted: 2024-10-05

## 2024-10-05 LAB
ANION GAP SERPL CALCULATED.3IONS-SCNC: 12 MMOL/L (ref 9–16)
BASOPHILS # BLD: 0 K/UL (ref 0–0.2)
BASOPHILS NFR BLD: 0 % (ref 0–2)
BUN SERPL-MCNC: 8 MG/DL (ref 6–20)
CA-I BLD-SCNC: 1.11 MMOL/L (ref 1.13–1.33)
CALCIUM SERPL-MCNC: 7.9 MG/DL (ref 8.6–10.4)
CHLORIDE SERPL-SCNC: 102 MMOL/L (ref 98–107)
CO2 SERPL-SCNC: 18 MMOL/L (ref 20–31)
CREAT SERPL-MCNC: 0.7 MG/DL (ref 0.5–0.9)
EOSINOPHIL # BLD: 0 K/UL (ref 0–0.44)
EOSINOPHILS RELATIVE PERCENT: 0 % (ref 1–4)
ERYTHROCYTE [DISTWIDTH] IN BLOOD BY AUTOMATED COUNT: 19.6 % (ref 11.8–14.4)
GFR, ESTIMATED: >90 ML/MIN/1.73M2
GLUCOSE BLD-MCNC: 105 MG/DL (ref 65–105)
GLUCOSE SERPL-MCNC: 116 MG/DL (ref 74–99)
HCT VFR BLD AUTO: 33.7 % (ref 36.3–47.1)
HGB BLD-MCNC: 10.2 G/DL (ref 11.9–15.1)
IMM GRANULOCYTES # BLD AUTO: 0.19 K/UL (ref 0–0.3)
IMM GRANULOCYTES NFR BLD: 1 %
LACTIC ACID, WHOLE BLOOD: 1.9 MMOL/L (ref 0.7–2.1)
LYMPHOCYTES NFR BLD: 0.57 K/UL (ref 1.1–3.7)
LYMPHOCYTES RELATIVE PERCENT: 3 % (ref 24–43)
MAGNESIUM SERPL-MCNC: 1.8 MG/DL (ref 1.6–2.6)
MCH RBC QN AUTO: 24.5 PG (ref 25.2–33.5)
MCHC RBC AUTO-ENTMCNC: 30.3 G/DL (ref 28.4–34.8)
MCV RBC AUTO: 81 FL (ref 82.6–102.9)
MONOCYTES NFR BLD: 0.57 K/UL (ref 0.1–1.2)
MONOCYTES NFR BLD: 3 % (ref 3–12)
MORPHOLOGY: ABNORMAL
MORPHOLOGY: ABNORMAL
NEUTROPHILS NFR BLD: 93 % (ref 36–65)
NEUTS SEG NFR BLD: 17.57 K/UL (ref 1.5–8.1)
NRBC BLD-RTO: 0 PER 100 WBC
PLATELET # BLD AUTO: ABNORMAL K/UL (ref 138–453)
PLATELET, FLUORESCENCE: ABNORMAL K/UL (ref 138–453)
POTASSIUM SERPL-SCNC: 4.4 MMOL/L (ref 3.7–5.3)
RBC # BLD AUTO: 4.16 M/UL (ref 3.95–5.11)
SODIUM SERPL-SCNC: 132 MMOL/L (ref 136–145)
WBC OTHER # BLD: 18.9 K/UL (ref 3.5–11.3)

## 2024-10-05 PROCEDURE — 36415 COLL VENOUS BLD VENIPUNCTURE: CPT

## 2024-10-05 PROCEDURE — 6360000002 HC RX W HCPCS: Performed by: STUDENT IN AN ORGANIZED HEALTH CARE EDUCATION/TRAINING PROGRAM

## 2024-10-05 PROCEDURE — 2580000003 HC RX 258: Performed by: STUDENT IN AN ORGANIZED HEALTH CARE EDUCATION/TRAINING PROGRAM

## 2024-10-05 PROCEDURE — 6370000000 HC RX 637 (ALT 250 FOR IP): Performed by: STUDENT IN AN ORGANIZED HEALTH CARE EDUCATION/TRAINING PROGRAM

## 2024-10-05 PROCEDURE — 2060000000 HC ICU INTERMEDIATE R&B

## 2024-10-05 PROCEDURE — 83605 ASSAY OF LACTIC ACID: CPT

## 2024-10-05 PROCEDURE — 80048 BASIC METABOLIC PNL TOTAL CA: CPT

## 2024-10-05 PROCEDURE — 99024 POSTOP FOLLOW-UP VISIT: CPT | Performed by: SURGERY

## 2024-10-05 PROCEDURE — 85025 COMPLETE CBC W/AUTO DIFF WBC: CPT

## 2024-10-05 PROCEDURE — 82947 ASSAY GLUCOSE BLOOD QUANT: CPT

## 2024-10-05 PROCEDURE — 6370000000 HC RX 637 (ALT 250 FOR IP): Performed by: NURSE PRACTITIONER

## 2024-10-05 PROCEDURE — 83735 ASSAY OF MAGNESIUM: CPT

## 2024-10-05 PROCEDURE — 85055 RETICULATED PLATELET ASSAY: CPT

## 2024-10-05 PROCEDURE — 82330 ASSAY OF CALCIUM: CPT

## 2024-10-05 PROCEDURE — 99232 SBSQ HOSP IP/OBS MODERATE 35: CPT | Performed by: STUDENT IN AN ORGANIZED HEALTH CARE EDUCATION/TRAINING PROGRAM

## 2024-10-05 RX ORDER — MORPHINE SULFATE 4 MG/ML
4 INJECTION, SOLUTION INTRAMUSCULAR; INTRAVENOUS
Status: DISCONTINUED | OUTPATIENT
Start: 2024-10-05 | End: 2024-10-10

## 2024-10-05 RX ORDER — SODIUM CHLORIDE, SODIUM LACTATE, POTASSIUM CHLORIDE, CALCIUM CHLORIDE 600; 310; 30; 20 MG/100ML; MG/100ML; MG/100ML; MG/100ML
INJECTION, SOLUTION INTRAVENOUS CONTINUOUS
Status: DISCONTINUED | OUTPATIENT
Start: 2024-10-05 | End: 2024-10-07

## 2024-10-05 RX ORDER — ENOXAPARIN SODIUM 100 MG/ML
40 INJECTION SUBCUTANEOUS DAILY
Status: DISCONTINUED | OUTPATIENT
Start: 2024-10-05 | End: 2024-10-16 | Stop reason: HOSPADM

## 2024-10-05 RX ORDER — CALCIUM GLUCONATE 20 MG/ML
1000 INJECTION, SOLUTION INTRAVENOUS ONCE
Status: COMPLETED | OUTPATIENT
Start: 2024-10-05 | End: 2024-10-05

## 2024-10-05 RX ORDER — DEXTROSE MONOHYDRATE AND SODIUM CHLORIDE 5; .45 G/100ML; G/100ML
INJECTION, SOLUTION INTRAVENOUS CONTINUOUS
Status: DISCONTINUED | OUTPATIENT
Start: 2024-10-05 | End: 2024-10-05

## 2024-10-05 RX ORDER — GABAPENTIN 100 MG/1
100 CAPSULE ORAL ONCE
Status: COMPLETED | OUTPATIENT
Start: 2024-10-05 | End: 2024-10-05

## 2024-10-05 RX ADMIN — METHOCARBAMOL 500 MG: 100 INJECTION INTRAMUSCULAR; INTRAVENOUS at 10:15

## 2024-10-05 RX ADMIN — SODIUM CHLORIDE, PRESERVATIVE FREE 40 MG: 5 INJECTION INTRAVENOUS at 22:12

## 2024-10-05 RX ADMIN — MORPHINE SULFATE 4 MG: 4 INJECTION INTRAVENOUS at 14:46

## 2024-10-05 RX ADMIN — MORPHINE SULFATE 4 MG: 4 INJECTION INTRAVENOUS at 06:48

## 2024-10-05 RX ADMIN — MORPHINE SULFATE 4 MG: 4 INJECTION INTRAVENOUS at 01:43

## 2024-10-05 RX ADMIN — SODIUM CHLORIDE, PRESERVATIVE FREE 10 ML: 5 INJECTION INTRAVENOUS at 11:05

## 2024-10-05 RX ADMIN — SODIUM CHLORIDE, POTASSIUM CHLORIDE, SODIUM LACTATE AND CALCIUM CHLORIDE: 600; 310; 30; 20 INJECTION, SOLUTION INTRAVENOUS at 22:13

## 2024-10-05 RX ADMIN — METHOCARBAMOL 500 MG: 100 INJECTION INTRAMUSCULAR; INTRAVENOUS at 18:02

## 2024-10-05 RX ADMIN — CALCIUM GLUCONATE 1000 MG: 20 INJECTION, SOLUTION INTRAVENOUS at 11:35

## 2024-10-05 RX ADMIN — PIPERACILLIN AND TAZOBACTAM 3375 MG: 3; .375 INJECTION, POWDER, LYOPHILIZED, FOR SOLUTION INTRAVENOUS at 12:44

## 2024-10-05 RX ADMIN — PIPERACILLIN AND TAZOBACTAM 3375 MG: 3; .375 INJECTION, POWDER, LYOPHILIZED, FOR SOLUTION INTRAVENOUS at 21:04

## 2024-10-05 RX ADMIN — DEXTROSE AND SODIUM CHLORIDE: 5; 450 INJECTION, SOLUTION INTRAVENOUS at 08:48

## 2024-10-05 RX ADMIN — GABAPENTIN 100 MG: 100 CAPSULE ORAL at 03:22

## 2024-10-05 RX ADMIN — MORPHINE SULFATE 4 MG: 4 INJECTION INTRAVENOUS at 11:27

## 2024-10-05 RX ADMIN — OXYCODONE 10 MG: 5 TABLET ORAL at 12:46

## 2024-10-05 RX ADMIN — MORPHINE SULFATE 4 MG: 4 INJECTION INTRAVENOUS at 18:40

## 2024-10-05 RX ADMIN — OXYCODONE 10 MG: 5 TABLET ORAL at 08:34

## 2024-10-05 RX ADMIN — SODIUM CHLORIDE: 9 INJECTION, SOLUTION INTRAVENOUS at 04:42

## 2024-10-05 RX ADMIN — OXYCODONE 10 MG: 5 TABLET ORAL at 21:01

## 2024-10-05 RX ADMIN — MORPHINE SULFATE 4 MG: 4 INJECTION INTRAVENOUS at 22:12

## 2024-10-05 RX ADMIN — OXYCODONE 10 MG: 5 TABLET ORAL at 16:58

## 2024-10-05 RX ADMIN — SODIUM CHLORIDE, PRESERVATIVE FREE 10 ML: 5 INJECTION INTRAVENOUS at 22:13

## 2024-10-05 RX ADMIN — OXYCODONE 10 MG: 5 TABLET ORAL at 00:36

## 2024-10-05 RX ADMIN — SODIUM CHLORIDE, PRESERVATIVE FREE 10 ML: 5 INJECTION INTRAVENOUS at 00:58

## 2024-10-05 RX ADMIN — PIPERACILLIN AND TAZOBACTAM 3375 MG: 3; .375 INJECTION, POWDER, LYOPHILIZED, FOR SOLUTION INTRAVENOUS at 04:43

## 2024-10-05 RX ADMIN — OXYCODONE 10 MG: 5 TABLET ORAL at 04:34

## 2024-10-05 RX ADMIN — SODIUM CHLORIDE, PRESERVATIVE FREE 40 MG: 5 INJECTION INTRAVENOUS at 11:30

## 2024-10-05 RX ADMIN — SODIUM CHLORIDE, POTASSIUM CHLORIDE, SODIUM LACTATE AND CALCIUM CHLORIDE: 600; 310; 30; 20 INJECTION, SOLUTION INTRAVENOUS at 12:43

## 2024-10-05 ASSESSMENT — PAIN DESCRIPTION - DIRECTION
RADIATING_TOWARDS: BACK

## 2024-10-05 ASSESSMENT — PAIN DESCRIPTION - DESCRIPTORS
DESCRIPTORS: ACHING;CRAMPING
DESCRIPTORS: ACHING
DESCRIPTORS: ACHING;BURNING;CRAMPING;DISCOMFORT
DESCRIPTORS: ACHING
DESCRIPTORS: ACHING;BURNING;CRAMPING;DISCOMFORT
DESCRIPTORS: ACHING;SHARP;STABBING;THROBBING
DESCRIPTORS: ACHING;CRAMPING
DESCRIPTORS: ACHING;CRAMPING
DESCRIPTORS: ACHING;BURNING;CRAMPING;DISCOMFORT
DESCRIPTORS: ACHING;CRAMPING

## 2024-10-05 ASSESSMENT — PAIN SCALES - GENERAL
PAINLEVEL_OUTOF10: 8
PAINLEVEL_OUTOF10: 10
PAINLEVEL_OUTOF10: 7
PAINLEVEL_OUTOF10: 10
PAINLEVEL_OUTOF10: 7
PAINLEVEL_OUTOF10: 9
PAINLEVEL_OUTOF10: 9
PAINLEVEL_OUTOF10: 10
PAINLEVEL_OUTOF10: 8
PAINLEVEL_OUTOF10: 8
PAINLEVEL_OUTOF10: 10
PAINLEVEL_OUTOF10: 9
PAINLEVEL_OUTOF10: 7

## 2024-10-05 ASSESSMENT — PAIN - FUNCTIONAL ASSESSMENT
PAIN_FUNCTIONAL_ASSESSMENT: PREVENTS OR INTERFERES SOME ACTIVE ACTIVITIES AND ADLS

## 2024-10-05 ASSESSMENT — PAIN DESCRIPTION - ONSET
ONSET: ON-GOING

## 2024-10-05 ASSESSMENT — PAIN DESCRIPTION - LOCATION
LOCATION: ABDOMEN

## 2024-10-05 ASSESSMENT — PAIN DESCRIPTION - FREQUENCY
FREQUENCY: CONTINUOUS

## 2024-10-05 ASSESSMENT — PAIN DESCRIPTION - PAIN TYPE
TYPE: ACUTE PAIN;SURGICAL PAIN

## 2024-10-05 ASSESSMENT — PAIN DESCRIPTION - ORIENTATION: ORIENTATION: MID

## 2024-10-05 NOTE — OP NOTE
Operative Note      Patient: Eunice Ortega  YOB: 1975  MRN: [id number]    Date of Procedure: 10/4/2024    Pre-Op Diagnosis Codes:      * Small bowel obstruction (HCC) [K56.609]    Post-Op Diagnosis: Post-Op Diagnosis Codes:     * Small bowel obstruction (HCC) [K56.609]  Perforated diverticulitis       Procedure(s):  LAPAROTOMY EXPLORATORY, SIGMOIDECTOMY, APPENDCTOMY, END COLOSTOMY    Surgeon(s):  Geovanni Lopez MD    Assistant:   Resident: Ruchi Weinberg MD    Anesthesia: General    Estimated Blood Loss (mL): less than 50     Complications: None    Specimens:   ID Type Source Tests Collected by Time Destination   A : SIGMOID COLON Tissue Sigmoid Colon SURGICAL PATHOLOGY Geovanni Lopez MD 10/4/2024 2106    B : APPENDIX Tissue Appendix SURGICAL PATHOLOGY Geovanni Lopez MD 10/4/2024 2116        Implants:  * No implants in log *      Drains:   Colostomy LLQ Descending/sigmoid (Active)       Urinary Catheter 10/04/24 Zhao (Active)       Findings:  Infection Present At Time Of Surgery (PATOS) (choose all levels that have infection present):  - Organ Space infection (below fascia) present as evidenced by pus, purulent fluid, and fluid consistent with infection  Other Findings: Perforated diverticulitis, purulent peritonitis    Detailed Description of Procedure:   Eunice Ortega is a 49-year-old female who presented with a small bowel obstruction found to have diverticulitis.  Decision was made to proceed to the operating room for exploratory laparotomy possible bowel resection possible ostomy creation.  Risks and benefits of the procedure explained the patient detail.  Risk including bleeding, infection, pain, scarring, damage surrounding structures, need for additional procedures were discussed.  She was given the opportunity ask questions.  Written and verbal consent was obtained.    Patient was transferred into the operative suite and placed on the operating table in the supine position.  General

## 2024-10-05 NOTE — BRIEF OP NOTE
Brief Postoperative Note      Patient: Eunice Ortega  YOB: 1975  MRN: [id number]    Date of Procedure: 10/4/2024    Pre-Op Diagnosis Codes:      Perforated diverticulitis     Post-Op Diagnosis: Same       Procedure(s):  LAPAROTOMY EXPLORATORY, SIGMOIDECTOMY, APPENDCTOMY, END COLOSTOMY    Surgeon(s):  Geovanni Lopez MD    Assistant:  Resident: Ruchi Weinberg MD    Anesthesia: General    Estimated Blood Loss (mL): 30cc    Complications: None    Specimens:   ID Type Source Tests Collected by Time Destination   A : SIGMOID COLON Tissue Sigmoid Colon SURGICAL PATHOLOGY Geovanni Lopez MD 10/4/2024 2106    B : APPENDIX Tissue Appendix SURGICAL PATHOLOGY Geovanni Lopez MD 10/4/2024 2116        Implants:  * No implants in log *      Drains:   Colostomy LLQ Descending/sigmoid (Active)       Urinary Catheter 10/04/24 Zhao (Active)     Findings:  Infection Present At Time Of Surgery (PATOS) (choose all levels that have infection present):  - Organ Space infection (below fascia) present as evidenced by abscess, pus, purulent fluid, and fluid consistent with infection   Other Findings: edematous, friable sigmoid colon with extensive inflammatory changes. 1L succus drained from abdomen. Sigmoidectomy and end colostomy creation, appendectomy.     Electronically signed by Ruchi Weinberg MD on 10/4/2024 at 11:17 PM

## 2024-10-06 LAB
ANION GAP SERPL CALCULATED.3IONS-SCNC: 8 MMOL/L (ref 9–16)
BASOPHILS # BLD: <0.03 K/UL (ref 0–0.2)
BASOPHILS NFR BLD: 0 % (ref 0–2)
BUN SERPL-MCNC: 9 MG/DL (ref 6–20)
CA-I BLD-SCNC: 1.04 MMOL/L (ref 1.13–1.33)
CALCIUM SERPL-MCNC: 7.7 MG/DL (ref 8.6–10.4)
CHLORIDE SERPL-SCNC: 105 MMOL/L (ref 98–107)
CO2 SERPL-SCNC: 21 MMOL/L (ref 20–31)
CREAT SERPL-MCNC: 0.5 MG/DL (ref 0.5–0.9)
EOSINOPHIL # BLD: <0.03 K/UL (ref 0–0.44)
EOSINOPHILS RELATIVE PERCENT: 0 % (ref 1–4)
ERYTHROCYTE [DISTWIDTH] IN BLOOD BY AUTOMATED COUNT: 19.5 % (ref 11.8–14.4)
GFR, ESTIMATED: >90 ML/MIN/1.73M2
GLUCOSE BLD-MCNC: 71 MG/DL (ref 65–105)
GLUCOSE BLD-MCNC: 87 MG/DL (ref 65–105)
GLUCOSE BLD-MCNC: 90 MG/DL (ref 65–105)
GLUCOSE BLD-MCNC: 96 MG/DL (ref 65–105)
GLUCOSE SERPL-MCNC: 102 MG/DL (ref 74–99)
HCT VFR BLD AUTO: 26.7 % (ref 36.3–47.1)
HGB BLD-MCNC: 8 G/DL (ref 11.9–15.1)
IMM GRANULOCYTES # BLD AUTO: 0.1 K/UL (ref 0–0.3)
IMM GRANULOCYTES NFR BLD: 1 %
LYMPHOCYTES NFR BLD: 1.11 K/UL (ref 1.1–3.7)
LYMPHOCYTES RELATIVE PERCENT: 7 % (ref 24–43)
MAGNESIUM SERPL-MCNC: 2 MG/DL (ref 1.6–2.6)
MCH RBC QN AUTO: 24.3 PG (ref 25.2–33.5)
MCHC RBC AUTO-ENTMCNC: 30 G/DL (ref 28.4–34.8)
MCV RBC AUTO: 81.2 FL (ref 82.6–102.9)
MONOCYTES NFR BLD: 0.94 K/UL (ref 0.1–1.2)
MONOCYTES NFR BLD: 6 % (ref 3–12)
NEUTROPHILS NFR BLD: 87 % (ref 36–65)
NEUTS SEG NFR BLD: 13.9 K/UL (ref 1.5–8.1)
NRBC BLD-RTO: 0 PER 100 WBC
PLATELET # BLD AUTO: 432 K/UL (ref 138–453)
PMV BLD AUTO: 9.2 FL (ref 8.1–13.5)
POTASSIUM SERPL-SCNC: 3.8 MMOL/L (ref 3.7–5.3)
RBC # BLD AUTO: 3.29 M/UL (ref 3.95–5.11)
RBC # BLD: ABNORMAL 10*6/UL
SODIUM SERPL-SCNC: 134 MMOL/L (ref 136–145)
WBC OTHER # BLD: 16.1 K/UL (ref 3.5–11.3)

## 2024-10-06 PROCEDURE — 36415 COLL VENOUS BLD VENIPUNCTURE: CPT

## 2024-10-06 PROCEDURE — 6360000002 HC RX W HCPCS: Performed by: STUDENT IN AN ORGANIZED HEALTH CARE EDUCATION/TRAINING PROGRAM

## 2024-10-06 PROCEDURE — 85025 COMPLETE CBC W/AUTO DIFF WBC: CPT

## 2024-10-06 PROCEDURE — 80048 BASIC METABOLIC PNL TOTAL CA: CPT

## 2024-10-06 PROCEDURE — 6370000000 HC RX 637 (ALT 250 FOR IP): Performed by: STUDENT IN AN ORGANIZED HEALTH CARE EDUCATION/TRAINING PROGRAM

## 2024-10-06 PROCEDURE — 83735 ASSAY OF MAGNESIUM: CPT

## 2024-10-06 PROCEDURE — 82330 ASSAY OF CALCIUM: CPT

## 2024-10-06 PROCEDURE — 2060000000 HC ICU INTERMEDIATE R&B

## 2024-10-06 PROCEDURE — 2580000003 HC RX 258: Performed by: STUDENT IN AN ORGANIZED HEALTH CARE EDUCATION/TRAINING PROGRAM

## 2024-10-06 PROCEDURE — 99232 SBSQ HOSP IP/OBS MODERATE 35: CPT | Performed by: STUDENT IN AN ORGANIZED HEALTH CARE EDUCATION/TRAINING PROGRAM

## 2024-10-06 PROCEDURE — 99024 POSTOP FOLLOW-UP VISIT: CPT | Performed by: SURGERY

## 2024-10-06 RX ORDER — CALCIUM GLUCONATE 20 MG/ML
1000 INJECTION, SOLUTION INTRAVENOUS ONCE
Status: COMPLETED | OUTPATIENT
Start: 2024-10-06 | End: 2024-10-06

## 2024-10-06 RX ORDER — CYANOCOBALAMIN 1000 UG/ML
1000 INJECTION, SOLUTION INTRAMUSCULAR; SUBCUTANEOUS ONCE
Status: COMPLETED | OUTPATIENT
Start: 2024-10-06 | End: 2024-10-06

## 2024-10-06 RX ADMIN — OXYCODONE 10 MG: 5 TABLET ORAL at 22:42

## 2024-10-06 RX ADMIN — MORPHINE SULFATE 4 MG: 4 INJECTION INTRAVENOUS at 15:41

## 2024-10-06 RX ADMIN — CALCIUM GLUCONATE 1000 MG: 20 INJECTION, SOLUTION INTRAVENOUS at 11:42

## 2024-10-06 RX ADMIN — OXYCODONE 10 MG: 5 TABLET ORAL at 06:49

## 2024-10-06 RX ADMIN — SODIUM CHLORIDE, PRESERVATIVE FREE 40 MG: 5 INJECTION INTRAVENOUS at 11:40

## 2024-10-06 RX ADMIN — METHOCARBAMOL 500 MG: 100 INJECTION INTRAMUSCULAR; INTRAVENOUS at 01:49

## 2024-10-06 RX ADMIN — MORPHINE SULFATE 4 MG: 4 INJECTION INTRAVENOUS at 03:25

## 2024-10-06 RX ADMIN — SODIUM CHLORIDE 200 MG: 9 INJECTION, SOLUTION INTRAVENOUS at 18:08

## 2024-10-06 RX ADMIN — SODIUM CHLORIDE, POTASSIUM CHLORIDE, SODIUM LACTATE AND CALCIUM CHLORIDE: 600; 310; 30; 20 INJECTION, SOLUTION INTRAVENOUS at 18:31

## 2024-10-06 RX ADMIN — CYANOCOBALAMIN 1000 MCG: 1000 INJECTION, SOLUTION INTRAMUSCULAR at 15:33

## 2024-10-06 RX ADMIN — PIPERACILLIN AND TAZOBACTAM 3375 MG: 3; .375 INJECTION, POWDER, LYOPHILIZED, FOR SOLUTION INTRAVENOUS at 13:30

## 2024-10-06 RX ADMIN — MORPHINE SULFATE 4 MG: 4 INJECTION INTRAVENOUS at 20:09

## 2024-10-06 RX ADMIN — ENOXAPARIN SODIUM 40 MG: 100 INJECTION SUBCUTANEOUS at 08:40

## 2024-10-06 RX ADMIN — OXYCODONE 10 MG: 5 TABLET ORAL at 01:46

## 2024-10-06 RX ADMIN — SODIUM CHLORIDE, PRESERVATIVE FREE 40 MG: 5 INJECTION INTRAVENOUS at 22:43

## 2024-10-06 RX ADMIN — SODIUM CHLORIDE, PRESERVATIVE FREE 10 ML: 5 INJECTION INTRAVENOUS at 08:41

## 2024-10-06 RX ADMIN — OXYCODONE 10 MG: 5 TABLET ORAL at 17:59

## 2024-10-06 RX ADMIN — METHOCARBAMOL 500 MG: 100 INJECTION INTRAMUSCULAR; INTRAVENOUS at 18:35

## 2024-10-06 RX ADMIN — OXYCODONE 10 MG: 5 TABLET ORAL at 13:22

## 2024-10-06 RX ADMIN — PIPERACILLIN AND TAZOBACTAM 3375 MG: 3; .375 INJECTION, POWDER, LYOPHILIZED, FOR SOLUTION INTRAVENOUS at 05:23

## 2024-10-06 RX ADMIN — MORPHINE SULFATE 4 MG: 4 INJECTION INTRAVENOUS at 10:08

## 2024-10-06 RX ADMIN — METHOCARBAMOL 500 MG: 100 INJECTION INTRAMUSCULAR; INTRAVENOUS at 09:31

## 2024-10-06 RX ADMIN — PIPERACILLIN AND TAZOBACTAM 3375 MG: 3; .375 INJECTION, POWDER, LYOPHILIZED, FOR SOLUTION INTRAVENOUS at 20:16

## 2024-10-06 RX ADMIN — SODIUM CHLORIDE, PRESERVATIVE FREE 10 ML: 5 INJECTION INTRAVENOUS at 20:10

## 2024-10-06 RX ADMIN — SODIUM CHLORIDE, POTASSIUM CHLORIDE, SODIUM LACTATE AND CALCIUM CHLORIDE: 600; 310; 30; 20 INJECTION, SOLUTION INTRAVENOUS at 09:31

## 2024-10-06 ASSESSMENT — PAIN DESCRIPTION - FREQUENCY
FREQUENCY: CONTINUOUS
FREQUENCY: CONTINUOUS

## 2024-10-06 ASSESSMENT — PAIN DESCRIPTION - ORIENTATION
ORIENTATION: MID

## 2024-10-06 ASSESSMENT — PAIN DESCRIPTION - DESCRIPTORS
DESCRIPTORS: ACHING;BURNING;CRAMPING;DISCOMFORT
DESCRIPTORS: ACHING;BURNING;CRAMPING
DESCRIPTORS: ACHING;BURNING;CRAMPING
DESCRIPTORS: ACHING;BURNING;CRAMPING;DISCOMFORT

## 2024-10-06 ASSESSMENT — PAIN DESCRIPTION - ONSET
ONSET: ON-GOING
ONSET: ON-GOING

## 2024-10-06 ASSESSMENT — PAIN SCALES - GENERAL
PAINLEVEL_OUTOF10: 9
PAINLEVEL_OUTOF10: 10
PAINLEVEL_OUTOF10: 9
PAINLEVEL_OUTOF10: 10
PAINLEVEL_OUTOF10: 8
PAINLEVEL_OUTOF10: 9
PAINLEVEL_OUTOF10: 7
PAINLEVEL_OUTOF10: 10
PAINLEVEL_OUTOF10: 8
PAINLEVEL_OUTOF10: 10
PAINLEVEL_OUTOF10: 10
PAINLEVEL_OUTOF10: 8
PAINLEVEL_OUTOF10: 7
PAINLEVEL_OUTOF10: 10
PAINLEVEL_OUTOF10: 9
PAINLEVEL_OUTOF10: 8
PAINLEVEL_OUTOF10: 10

## 2024-10-06 ASSESSMENT — PAIN DESCRIPTION - DIRECTION
RADIATING_TOWARDS: BACK
RADIATING_TOWARDS: BACK

## 2024-10-06 ASSESSMENT — PAIN DESCRIPTION - LOCATION
LOCATION: ABDOMEN
LOCATION: ABDOMEN
LOCATION: ABDOMEN;INCISION
LOCATION: ABDOMEN

## 2024-10-06 ASSESSMENT — PAIN DESCRIPTION - PAIN TYPE
TYPE: ACUTE PAIN;SURGICAL PAIN
TYPE: ACUTE PAIN;SURGICAL PAIN

## 2024-10-07 LAB
ANION GAP SERPL CALCULATED.3IONS-SCNC: 8 MMOL/L (ref 9–16)
BASOPHILS # BLD: 0.03 K/UL (ref 0–0.2)
BASOPHILS NFR BLD: 0 % (ref 0–2)
BUN SERPL-MCNC: 5 MG/DL (ref 6–20)
CALCIUM SERPL-MCNC: 7.6 MG/DL (ref 8.6–10.4)
CHLORIDE SERPL-SCNC: 104 MMOL/L (ref 98–107)
CO2 SERPL-SCNC: 22 MMOL/L (ref 20–31)
CREAT SERPL-MCNC: 0.5 MG/DL (ref 0.5–0.9)
EOSINOPHIL # BLD: 0.19 K/UL (ref 0–0.44)
EOSINOPHILS RELATIVE PERCENT: 2 % (ref 1–4)
ERYTHROCYTE [DISTWIDTH] IN BLOOD BY AUTOMATED COUNT: 19.6 % (ref 11.8–14.4)
GFR, ESTIMATED: >90 ML/MIN/1.73M2
GLUCOSE BLD-MCNC: 62 MG/DL (ref 65–105)
GLUCOSE BLD-MCNC: 91 MG/DL (ref 65–105)
GLUCOSE BLD-MCNC: 91 MG/DL (ref 65–105)
GLUCOSE SERPL-MCNC: 90 MG/DL (ref 74–99)
HCT VFR BLD AUTO: 27.4 % (ref 36.3–47.1)
HGB BLD-MCNC: 8 G/DL (ref 11.9–15.1)
IMM GRANULOCYTES # BLD AUTO: 0.08 K/UL (ref 0–0.3)
IMM GRANULOCYTES NFR BLD: 1 %
LYMPHOCYTES NFR BLD: 1.24 K/UL (ref 1.1–3.7)
LYMPHOCYTES RELATIVE PERCENT: 11 % (ref 24–43)
MCH RBC QN AUTO: 24.3 PG (ref 25.2–33.5)
MCHC RBC AUTO-ENTMCNC: 29.2 G/DL (ref 28.4–34.8)
MCV RBC AUTO: 83.3 FL (ref 82.6–102.9)
MICROORGANISM SPEC CULT: NORMAL
MONOCYTES NFR BLD: 0.98 K/UL (ref 0.1–1.2)
MONOCYTES NFR BLD: 9 % (ref 3–12)
NEUTROPHILS NFR BLD: 77 % (ref 36–65)
NEUTS SEG NFR BLD: 8.7 K/UL (ref 1.5–8.1)
NRBC BLD-RTO: 0.3 PER 100 WBC
PLATELET # BLD AUTO: 445 K/UL (ref 138–453)
PMV BLD AUTO: 8.9 FL (ref 8.1–13.5)
POTASSIUM SERPL-SCNC: 3.7 MMOL/L (ref 3.7–5.3)
RBC # BLD AUTO: 3.29 M/UL (ref 3.95–5.11)
RBC # BLD: ABNORMAL 10*6/UL
SERVICE CMNT-IMP: NORMAL
SODIUM SERPL-SCNC: 134 MMOL/L (ref 136–145)
SPECIMEN DESCRIPTION: NORMAL
WBC OTHER # BLD: 11.2 K/UL (ref 3.5–11.3)

## 2024-10-07 PROCEDURE — 2580000003 HC RX 258: Performed by: STUDENT IN AN ORGANIZED HEALTH CARE EDUCATION/TRAINING PROGRAM

## 2024-10-07 PROCEDURE — 36415 COLL VENOUS BLD VENIPUNCTURE: CPT

## 2024-10-07 PROCEDURE — 2500000003 HC RX 250 WO HCPCS: Performed by: NURSE PRACTITIONER

## 2024-10-07 PROCEDURE — 6370000000 HC RX 637 (ALT 250 FOR IP): Performed by: STUDENT IN AN ORGANIZED HEALTH CARE EDUCATION/TRAINING PROGRAM

## 2024-10-07 PROCEDURE — 99024 POSTOP FOLLOW-UP VISIT: CPT | Performed by: SURGERY

## 2024-10-07 PROCEDURE — 6360000002 HC RX W HCPCS: Performed by: STUDENT IN AN ORGANIZED HEALTH CARE EDUCATION/TRAINING PROGRAM

## 2024-10-07 PROCEDURE — 82947 ASSAY GLUCOSE BLOOD QUANT: CPT

## 2024-10-07 PROCEDURE — 6360000002 HC RX W HCPCS: Performed by: NURSE PRACTITIONER

## 2024-10-07 PROCEDURE — 80048 BASIC METABOLIC PNL TOTAL CA: CPT

## 2024-10-07 PROCEDURE — 2060000000 HC ICU INTERMEDIATE R&B

## 2024-10-07 PROCEDURE — 99211 OFF/OP EST MAY X REQ PHY/QHP: CPT

## 2024-10-07 PROCEDURE — 99232 SBSQ HOSP IP/OBS MODERATE 35: CPT | Performed by: STUDENT IN AN ORGANIZED HEALTH CARE EDUCATION/TRAINING PROGRAM

## 2024-10-07 PROCEDURE — 85025 COMPLETE CBC W/AUTO DIFF WBC: CPT

## 2024-10-07 PROCEDURE — 2500000003 HC RX 250 WO HCPCS: Performed by: STUDENT IN AN ORGANIZED HEALTH CARE EDUCATION/TRAINING PROGRAM

## 2024-10-07 RX ORDER — GLUCAGON 1 MG/ML
1 KIT INJECTION PRN
Status: DISCONTINUED | OUTPATIENT
Start: 2024-10-07 | End: 2024-10-16

## 2024-10-07 RX ORDER — TAMSULOSIN HYDROCHLORIDE 0.4 MG/1
0.4 CAPSULE ORAL DAILY
Status: DISCONTINUED | OUTPATIENT
Start: 2024-10-07 | End: 2024-10-07

## 2024-10-07 RX ORDER — KETOROLAC TROMETHAMINE 15 MG/ML
15 INJECTION, SOLUTION INTRAMUSCULAR; INTRAVENOUS EVERY 6 HOURS
Status: DISPENSED | OUTPATIENT
Start: 2024-10-07 | End: 2024-10-12

## 2024-10-07 RX ORDER — DEXTROSE MONOHYDRATE, SODIUM CHLORIDE, AND POTASSIUM CHLORIDE 50; 2.98; 9 G/1000ML; G/1000ML; G/1000ML
INJECTION, SOLUTION INTRAVENOUS CONTINUOUS
Status: DISCONTINUED | OUTPATIENT
Start: 2024-10-07 | End: 2024-10-10 | Stop reason: RX

## 2024-10-07 RX ADMIN — POTASSIUM CHLORIDE, DEXTROSE MONOHYDRATE AND SODIUM CHLORIDE: 300; 5; 900 INJECTION, SOLUTION INTRAVENOUS at 22:27

## 2024-10-07 RX ADMIN — GLUCAGON 1 MG: KIT at 01:23

## 2024-10-07 RX ADMIN — SODIUM CHLORIDE, PRESERVATIVE FREE 40 MG: 5 INJECTION INTRAVENOUS at 09:58

## 2024-10-07 RX ADMIN — BENZOCAINE AND MENTHOL 1 LOZENGE: 15; 3.6 LOZENGE ORAL at 21:38

## 2024-10-07 RX ADMIN — METHOCARBAMOL 500 MG: 100 INJECTION INTRAMUSCULAR; INTRAVENOUS at 20:50

## 2024-10-07 RX ADMIN — METHOCARBAMOL 500 MG: 100 INJECTION INTRAMUSCULAR; INTRAVENOUS at 00:39

## 2024-10-07 RX ADMIN — POTASSIUM CHLORIDE, DEXTROSE MONOHYDRATE AND SODIUM CHLORIDE: 300; 5; 900 INJECTION, SOLUTION INTRAVENOUS at 11:25

## 2024-10-07 RX ADMIN — PIPERACILLIN AND TAZOBACTAM 3375 MG: 3; .375 INJECTION, POWDER, LYOPHILIZED, FOR SOLUTION INTRAVENOUS at 16:29

## 2024-10-07 RX ADMIN — KETOROLAC TROMETHAMINE 15 MG: 15 INJECTION, SOLUTION INTRAMUSCULAR; INTRAVENOUS at 17:58

## 2024-10-07 RX ADMIN — POTASSIUM CHLORIDE, DEXTROSE MONOHYDRATE AND SODIUM CHLORIDE: 300; 5; 900 INJECTION, SOLUTION INTRAVENOUS at 01:21

## 2024-10-07 RX ADMIN — ENOXAPARIN SODIUM 40 MG: 100 INJECTION SUBCUTANEOUS at 09:51

## 2024-10-07 RX ADMIN — OXYCODONE 10 MG: 5 TABLET ORAL at 11:14

## 2024-10-07 RX ADMIN — MORPHINE SULFATE 4 MG: 4 INJECTION INTRAVENOUS at 04:37

## 2024-10-07 RX ADMIN — MORPHINE SULFATE 4 MG: 4 INJECTION INTRAVENOUS at 09:51

## 2024-10-07 RX ADMIN — Medication 0.2 MG/KG/HR: at 14:53

## 2024-10-07 RX ADMIN — MORPHINE SULFATE 4 MG: 4 INJECTION INTRAVENOUS at 00:38

## 2024-10-07 RX ADMIN — OXYCODONE 10 MG: 5 TABLET ORAL at 07:01

## 2024-10-07 RX ADMIN — OXYCODONE 5 MG: 5 TABLET ORAL at 21:34

## 2024-10-07 RX ADMIN — OXYCODONE 10 MG: 5 TABLET ORAL at 02:45

## 2024-10-07 RX ADMIN — PIPERACILLIN AND TAZOBACTAM 3375 MG: 3; .375 INJECTION, POWDER, LYOPHILIZED, FOR SOLUTION INTRAVENOUS at 04:46

## 2024-10-07 RX ADMIN — SODIUM CHLORIDE, PRESERVATIVE FREE 40 MG: 5 INJECTION INTRAVENOUS at 22:53

## 2024-10-07 RX ADMIN — METHOCARBAMOL 500 MG: 100 INJECTION INTRAMUSCULAR; INTRAVENOUS at 09:53

## 2024-10-07 RX ADMIN — SODIUM CHLORIDE 200 MG: 9 INJECTION, SOLUTION INTRAVENOUS at 14:44

## 2024-10-07 ASSESSMENT — PAIN SCALES - GENERAL
PAINLEVEL_OUTOF10: 8
PAINLEVEL_OUTOF10: 7
PAINLEVEL_OUTOF10: 7
PAINLEVEL_OUTOF10: 10
PAINLEVEL_OUTOF10: 6

## 2024-10-07 ASSESSMENT — PAIN DESCRIPTION - FREQUENCY
FREQUENCY: CONTINUOUS
FREQUENCY: CONTINUOUS

## 2024-10-07 ASSESSMENT — PAIN DESCRIPTION - LOCATION
LOCATION: ABDOMEN;INCISION
LOCATION: ABDOMEN;INCISION
LOCATION: ABDOMEN
LOCATION: ABDOMEN;INCISION
LOCATION: ABDOMEN;INCISION
LOCATION: ABDOMEN

## 2024-10-07 ASSESSMENT — PAIN - FUNCTIONAL ASSESSMENT
PAIN_FUNCTIONAL_ASSESSMENT: PREVENTS OR INTERFERES SOME ACTIVE ACTIVITIES AND ADLS

## 2024-10-07 ASSESSMENT — PAIN DESCRIPTION - DESCRIPTORS
DESCRIPTORS: ACHING;BURNING;CRAMPING;DISCOMFORT
DESCRIPTORS: ACHING;DISCOMFORT

## 2024-10-07 ASSESSMENT — PAIN DESCRIPTION - ONSET
ONSET: ON-GOING
ONSET: ON-GOING

## 2024-10-07 ASSESSMENT — PAIN DESCRIPTION - PAIN TYPE
TYPE: ACUTE PAIN;SURGICAL PAIN
TYPE: ACUTE PAIN;SURGICAL PAIN

## 2024-10-07 ASSESSMENT — PAIN DESCRIPTION - ORIENTATION
ORIENTATION: MID
ORIENTATION: MID;UPPER;LOWER
ORIENTATION: MID
ORIENTATION: MID

## 2024-10-07 NOTE — CARE COORDINATION
TRANSITIONAL CARE/DISCHARGE PLANNING ONGOING EVALUATION      Reason for Admission: Sigmoid diverticulitis [K57.32]  Acute cystitis without hematuria [N30.00]  Acute diverticulitis [K57.92]     Hospital day:5    Patient goals/Transitional Plan:    Spoke with patient about transition and discharge planning, discussed home care services at discharge.  Patient agreeable, freedom of choice list reviewed, due to having no PCP, pt choose [facility].  Referral sent        Readmission Risk              Risk of Unplanned Readmission:  10

## 2024-10-08 LAB
ANION GAP SERPL CALCULATED.3IONS-SCNC: 7 MMOL/L (ref 9–16)
BASOPHILS # BLD: 0.03 K/UL (ref 0–0.2)
BASOPHILS NFR BLD: 0 % (ref 0–2)
BUN SERPL-MCNC: 3 MG/DL (ref 6–20)
CALCIUM SERPL-MCNC: 7.8 MG/DL (ref 8.6–10.4)
CHLORIDE SERPL-SCNC: 107 MMOL/L (ref 98–107)
CO2 SERPL-SCNC: 21 MMOL/L (ref 20–31)
CREAT SERPL-MCNC: 0.5 MG/DL (ref 0.5–0.9)
EOSINOPHIL # BLD: 0.28 K/UL (ref 0–0.44)
EOSINOPHILS RELATIVE PERCENT: 2 % (ref 1–4)
ERYTHROCYTE [DISTWIDTH] IN BLOOD BY AUTOMATED COUNT: 19.5 % (ref 11.8–14.4)
GFR, ESTIMATED: >90 ML/MIN/1.73M2
GLUCOSE BLD-MCNC: 100 MG/DL (ref 65–105)
GLUCOSE BLD-MCNC: 115 MG/DL (ref 65–105)
GLUCOSE BLD-MCNC: 93 MG/DL (ref 65–105)
GLUCOSE BLD-MCNC: 94 MG/DL (ref 65–105)
GLUCOSE SERPL-MCNC: 106 MG/DL (ref 74–99)
HCT VFR BLD AUTO: 27.1 % (ref 36.3–47.1)
HGB BLD-MCNC: 8 G/DL (ref 11.9–15.1)
IMM GRANULOCYTES # BLD AUTO: 0.09 K/UL (ref 0–0.3)
IMM GRANULOCYTES NFR BLD: 1 %
LYMPHOCYTES NFR BLD: 1.33 K/UL (ref 1.1–3.7)
LYMPHOCYTES RELATIVE PERCENT: 11 % (ref 24–43)
MCH RBC QN AUTO: 24.3 PG (ref 25.2–33.5)
MCHC RBC AUTO-ENTMCNC: 29.5 G/DL (ref 28.4–34.8)
MCV RBC AUTO: 82.4 FL (ref 82.6–102.9)
MONOCYTES NFR BLD: 1.14 K/UL (ref 0.1–1.2)
MONOCYTES NFR BLD: 9 % (ref 3–12)
NEUTROPHILS NFR BLD: 77 % (ref 36–65)
NEUTS SEG NFR BLD: 9.81 K/UL (ref 1.5–8.1)
NRBC BLD-RTO: 0.2 PER 100 WBC
PLATELET # BLD AUTO: 463 K/UL (ref 138–453)
PMV BLD AUTO: 9.2 FL (ref 8.1–13.5)
POTASSIUM SERPL-SCNC: 4 MMOL/L (ref 3.7–5.3)
RBC # BLD AUTO: 3.29 M/UL (ref 3.95–5.11)
RBC # BLD: ABNORMAL 10*6/UL
SODIUM SERPL-SCNC: 135 MMOL/L (ref 136–145)
WBC OTHER # BLD: 12.7 K/UL (ref 3.5–11.3)

## 2024-10-08 PROCEDURE — 6360000002 HC RX W HCPCS: Performed by: PHYSICIAN ASSISTANT

## 2024-10-08 PROCEDURE — 2060000000 HC ICU INTERMEDIATE R&B

## 2024-10-08 PROCEDURE — 6370000000 HC RX 637 (ALT 250 FOR IP): Performed by: STUDENT IN AN ORGANIZED HEALTH CARE EDUCATION/TRAINING PROGRAM

## 2024-10-08 PROCEDURE — 6360000002 HC RX W HCPCS: Performed by: NURSE PRACTITIONER

## 2024-10-08 PROCEDURE — 6360000002 HC RX W HCPCS: Performed by: STUDENT IN AN ORGANIZED HEALTH CARE EDUCATION/TRAINING PROGRAM

## 2024-10-08 PROCEDURE — 2580000003 HC RX 258: Performed by: STUDENT IN AN ORGANIZED HEALTH CARE EDUCATION/TRAINING PROGRAM

## 2024-10-08 PROCEDURE — 82947 ASSAY GLUCOSE BLOOD QUANT: CPT

## 2024-10-08 PROCEDURE — 99024 POSTOP FOLLOW-UP VISIT: CPT | Performed by: PHYSICIAN ASSISTANT

## 2024-10-08 PROCEDURE — 2500000003 HC RX 250 WO HCPCS: Performed by: STUDENT IN AN ORGANIZED HEALTH CARE EDUCATION/TRAINING PROGRAM

## 2024-10-08 PROCEDURE — 85025 COMPLETE CBC W/AUTO DIFF WBC: CPT

## 2024-10-08 PROCEDURE — 80048 BASIC METABOLIC PNL TOTAL CA: CPT

## 2024-10-08 PROCEDURE — 99024 POSTOP FOLLOW-UP VISIT: CPT | Performed by: SURGERY

## 2024-10-08 PROCEDURE — 36415 COLL VENOUS BLD VENIPUNCTURE: CPT

## 2024-10-08 RX ORDER — FENTANYL CITRATE 50 UG/ML
25 INJECTION, SOLUTION INTRAMUSCULAR; INTRAVENOUS ONCE
Status: COMPLETED | OUTPATIENT
Start: 2024-10-08 | End: 2024-10-08

## 2024-10-08 RX ADMIN — MORPHINE SULFATE 4 MG: 4 INJECTION INTRAVENOUS at 20:52

## 2024-10-08 RX ADMIN — FENTANYL CITRATE 25 MCG: 50 INJECTION, SOLUTION INTRAMUSCULAR; INTRAVENOUS at 08:25

## 2024-10-08 RX ADMIN — PIPERACILLIN AND TAZOBACTAM 3375 MG: 3; .375 INJECTION, POWDER, LYOPHILIZED, FOR SOLUTION INTRAVENOUS at 23:44

## 2024-10-08 RX ADMIN — BENZOCAINE AND MENTHOL 1 LOZENGE: 15; 3.6 LOZENGE ORAL at 05:15

## 2024-10-08 RX ADMIN — KETOROLAC TROMETHAMINE 15 MG: 15 INJECTION, SOLUTION INTRAMUSCULAR; INTRAVENOUS at 05:52

## 2024-10-08 RX ADMIN — KETOROLAC TROMETHAMINE 15 MG: 15 INJECTION, SOLUTION INTRAMUSCULAR; INTRAVENOUS at 00:04

## 2024-10-08 RX ADMIN — METHOCARBAMOL 500 MG: 100 INJECTION INTRAMUSCULAR; INTRAVENOUS at 05:04

## 2024-10-08 RX ADMIN — SODIUM CHLORIDE, PRESERVATIVE FREE 40 MG: 5 INJECTION INTRAVENOUS at 23:48

## 2024-10-08 RX ADMIN — PIPERACILLIN AND TAZOBACTAM 3375 MG: 3; .375 INJECTION, POWDER, LYOPHILIZED, FOR SOLUTION INTRAVENOUS at 00:08

## 2024-10-08 RX ADMIN — KETOROLAC TROMETHAMINE 15 MG: 15 INJECTION, SOLUTION INTRAMUSCULAR; INTRAVENOUS at 12:13

## 2024-10-08 RX ADMIN — POTASSIUM CHLORIDE, DEXTROSE MONOHYDRATE AND SODIUM CHLORIDE: 300; 5; 900 INJECTION, SOLUTION INTRAVENOUS at 10:27

## 2024-10-08 RX ADMIN — KETOROLAC TROMETHAMINE 15 MG: 15 INJECTION, SOLUTION INTRAMUSCULAR; INTRAVENOUS at 17:36

## 2024-10-08 RX ADMIN — PIPERACILLIN AND TAZOBACTAM 3375 MG: 3; .375 INJECTION, POWDER, LYOPHILIZED, FOR SOLUTION INTRAVENOUS at 09:04

## 2024-10-08 RX ADMIN — SODIUM CHLORIDE, PRESERVATIVE FREE 40 MG: 5 INJECTION INTRAVENOUS at 12:13

## 2024-10-08 RX ADMIN — KETOROLAC TROMETHAMINE 15 MG: 15 INJECTION, SOLUTION INTRAMUSCULAR; INTRAVENOUS at 23:45

## 2024-10-08 RX ADMIN — MORPHINE SULFATE 4 MG: 4 INJECTION INTRAVENOUS at 10:30

## 2024-10-08 RX ADMIN — PIPERACILLIN AND TAZOBACTAM 3375 MG: 3; .375 INJECTION, POWDER, LYOPHILIZED, FOR SOLUTION INTRAVENOUS at 17:41

## 2024-10-08 RX ADMIN — ENOXAPARIN SODIUM 40 MG: 100 INJECTION SUBCUTANEOUS at 09:02

## 2024-10-08 RX ADMIN — OXYCODONE 5 MG: 5 TABLET ORAL at 14:36

## 2024-10-08 RX ADMIN — BENZOCAINE AND MENTHOL 1 LOZENGE: 15; 3.6 LOZENGE ORAL at 21:25

## 2024-10-08 RX ADMIN — OXYCODONE 5 MG: 5 TABLET ORAL at 02:14

## 2024-10-08 ASSESSMENT — PAIN SCALES - GENERAL
PAINLEVEL_OUTOF10: 10
PAINLEVEL_OUTOF10: 6
PAINLEVEL_OUTOF10: 10
PAINLEVEL_OUTOF10: 6
PAINLEVEL_OUTOF10: 2
PAINLEVEL_OUTOF10: 10
PAINLEVEL_OUTOF10: 6
PAINLEVEL_OUTOF10: 0
PAINLEVEL_OUTOF10: 4
PAINLEVEL_OUTOF10: 0
PAINLEVEL_OUTOF10: 3
PAINLEVEL_OUTOF10: 0
PAINLEVEL_OUTOF10: 6

## 2024-10-08 ASSESSMENT — PAIN DESCRIPTION - LOCATION
LOCATION: ABDOMEN

## 2024-10-09 LAB
GLUCOSE BLD-MCNC: 103 MG/DL (ref 65–105)
SURGICAL PATHOLOGY REPORT: NORMAL

## 2024-10-09 PROCEDURE — 6360000002 HC RX W HCPCS: Performed by: NURSE PRACTITIONER

## 2024-10-09 PROCEDURE — 2580000003 HC RX 258: Performed by: STUDENT IN AN ORGANIZED HEALTH CARE EDUCATION/TRAINING PROGRAM

## 2024-10-09 PROCEDURE — 2060000000 HC ICU INTERMEDIATE R&B

## 2024-10-09 PROCEDURE — 6370000000 HC RX 637 (ALT 250 FOR IP): Performed by: STUDENT IN AN ORGANIZED HEALTH CARE EDUCATION/TRAINING PROGRAM

## 2024-10-09 PROCEDURE — 6360000002 HC RX W HCPCS: Performed by: STUDENT IN AN ORGANIZED HEALTH CARE EDUCATION/TRAINING PROGRAM

## 2024-10-09 PROCEDURE — 2500000003 HC RX 250 WO HCPCS: Performed by: STUDENT IN AN ORGANIZED HEALTH CARE EDUCATION/TRAINING PROGRAM

## 2024-10-09 PROCEDURE — 99024 POSTOP FOLLOW-UP VISIT: CPT

## 2024-10-09 PROCEDURE — 2500000003 HC RX 250 WO HCPCS: Performed by: NURSE PRACTITIONER

## 2024-10-09 PROCEDURE — 82947 ASSAY GLUCOSE BLOOD QUANT: CPT

## 2024-10-09 PROCEDURE — 99024 POSTOP FOLLOW-UP VISIT: CPT | Performed by: SURGERY

## 2024-10-09 RX ADMIN — PIPERACILLIN AND TAZOBACTAM 3375 MG: 3; .375 INJECTION, POWDER, LYOPHILIZED, FOR SOLUTION INTRAVENOUS at 09:21

## 2024-10-09 RX ADMIN — OXYCODONE 5 MG: 5 TABLET ORAL at 19:50

## 2024-10-09 RX ADMIN — PIPERACILLIN AND TAZOBACTAM 3375 MG: 3; .375 INJECTION, POWDER, LYOPHILIZED, FOR SOLUTION INTRAVENOUS at 17:06

## 2024-10-09 RX ADMIN — POTASSIUM CHLORIDE, DEXTROSE MONOHYDRATE AND SODIUM CHLORIDE: 300; 5; 900 INJECTION, SOLUTION INTRAVENOUS at 20:13

## 2024-10-09 RX ADMIN — Medication 0.2 MG/KG/HR: at 06:04

## 2024-10-09 RX ADMIN — OXYCODONE 5 MG: 5 TABLET ORAL at 04:13

## 2024-10-09 RX ADMIN — MORPHINE SULFATE 4 MG: 4 INJECTION INTRAVENOUS at 22:04

## 2024-10-09 RX ADMIN — OXYCODONE 5 MG: 5 TABLET ORAL at 12:48

## 2024-10-09 RX ADMIN — SODIUM CHLORIDE, PRESERVATIVE FREE 40 MG: 5 INJECTION INTRAVENOUS at 22:04

## 2024-10-09 RX ADMIN — KETOROLAC TROMETHAMINE 15 MG: 15 INJECTION, SOLUTION INTRAMUSCULAR; INTRAVENOUS at 11:49

## 2024-10-09 RX ADMIN — KETOROLAC TROMETHAMINE 15 MG: 15 INJECTION, SOLUTION INTRAMUSCULAR; INTRAVENOUS at 18:17

## 2024-10-09 RX ADMIN — SODIUM CHLORIDE, PRESERVATIVE FREE 40 MG: 5 INJECTION INTRAVENOUS at 11:19

## 2024-10-09 RX ADMIN — KETOROLAC TROMETHAMINE 15 MG: 15 INJECTION, SOLUTION INTRAMUSCULAR; INTRAVENOUS at 06:02

## 2024-10-09 RX ADMIN — POTASSIUM CHLORIDE, DEXTROSE MONOHYDRATE AND SODIUM CHLORIDE: 300; 5; 900 INJECTION, SOLUTION INTRAVENOUS at 09:12

## 2024-10-09 RX ADMIN — MORPHINE SULFATE 4 MG: 4 INJECTION INTRAVENOUS at 14:28

## 2024-10-09 RX ADMIN — MORPHINE SULFATE 4 MG: 4 INJECTION INTRAVENOUS at 08:47

## 2024-10-09 RX ADMIN — ENOXAPARIN SODIUM 40 MG: 100 INJECTION SUBCUTANEOUS at 08:46

## 2024-10-09 ASSESSMENT — PAIN SCALES - GENERAL
PAINLEVEL_OUTOF10: 9
PAINLEVEL_OUTOF10: 0
PAINLEVEL_OUTOF10: 10
PAINLEVEL_OUTOF10: 10
PAINLEVEL_OUTOF10: 7
PAINLEVEL_OUTOF10: 10
PAINLEVEL_OUTOF10: 9
PAINLEVEL_OUTOF10: 2
PAINLEVEL_OUTOF10: 7
PAINLEVEL_OUTOF10: 6
PAINLEVEL_OUTOF10: 9
PAINLEVEL_OUTOF10: 6
PAINLEVEL_OUTOF10: 10
PAINLEVEL_OUTOF10: 10
PAINLEVEL_OUTOF10: 4

## 2024-10-09 ASSESSMENT — PAIN DESCRIPTION - DESCRIPTORS: DESCRIPTORS: ACHING

## 2024-10-10 ENCOUNTER — APPOINTMENT (OUTPATIENT)
Dept: CT IMAGING | Age: 49
DRG: 330 | End: 2024-10-10
Payer: COMMERCIAL

## 2024-10-10 LAB
ALBUMIN SERPL-MCNC: 2.5 G/DL (ref 3.5–5.2)
ALBUMIN/GLOB SERPL: 1 {RATIO} (ref 1–2.5)
ALP SERPL-CCNC: 116 U/L (ref 35–104)
ALT SERPL-CCNC: 16 U/L (ref 10–35)
ANION GAP SERPL CALCULATED.3IONS-SCNC: 9 MMOL/L (ref 9–16)
AST SERPL-CCNC: 26 U/L (ref 10–35)
BASOPHILS # BLD: 0 K/UL (ref 0–0.2)
BASOPHILS NFR BLD: 0 % (ref 0–2)
BILIRUB DIRECT SERPL-MCNC: 0.2 MG/DL (ref 0–0.2)
BILIRUB INDIRECT SERPL-MCNC: 0.2 MG/DL (ref 0–1)
BILIRUB SERPL-MCNC: 0.4 MG/DL (ref 0–1.2)
BUN SERPL-MCNC: 2 MG/DL (ref 6–20)
CALCIUM SERPL-MCNC: 8.1 MG/DL (ref 8.6–10.4)
CHLORIDE SERPL-SCNC: 108 MMOL/L (ref 98–107)
CO2 SERPL-SCNC: 19 MMOL/L (ref 20–31)
CREAT SERPL-MCNC: 0.6 MG/DL (ref 0.5–0.9)
EOSINOPHIL # BLD: 0.44 K/UL (ref 0–0.44)
EOSINOPHILS RELATIVE PERCENT: 4 % (ref 1–4)
ERYTHROCYTE [DISTWIDTH] IN BLOOD BY AUTOMATED COUNT: 20.6 % (ref 11.8–14.4)
GFR, ESTIMATED: >90 ML/MIN/1.73M2
GLOBULIN SER CALC-MCNC: 2.9 G/DL
GLUCOSE BLD-MCNC: 125 MG/DL (ref 65–105)
GLUCOSE BLD-MCNC: 86 MG/DL (ref 65–105)
GLUCOSE BLD-MCNC: 93 MG/DL (ref 65–105)
GLUCOSE SERPL-MCNC: 114 MG/DL (ref 74–99)
HCT VFR BLD AUTO: 28.6 % (ref 36.3–47.1)
HGB BLD-MCNC: 8.2 G/DL (ref 11.9–15.1)
IMM GRANULOCYTES # BLD AUTO: 0.11 K/UL (ref 0–0.3)
IMM GRANULOCYTES NFR BLD: 1 %
LYMPHOCYTES NFR BLD: 1.2 K/UL (ref 1.1–3.7)
LYMPHOCYTES RELATIVE PERCENT: 11 % (ref 24–43)
MCH RBC QN AUTO: 24.6 PG (ref 25.2–33.5)
MCHC RBC AUTO-ENTMCNC: 28.7 G/DL (ref 28.4–34.8)
MCV RBC AUTO: 85.6 FL (ref 82.6–102.9)
MONOCYTES NFR BLD: 0.98 K/UL (ref 0.1–1.2)
MONOCYTES NFR BLD: 9 % (ref 3–12)
MORPHOLOGY: ABNORMAL
NEUTROPHILS NFR BLD: 75 % (ref 36–65)
NEUTS SEG NFR BLD: 8.17 K/UL (ref 1.5–8.1)
NRBC BLD-RTO: 0 PER 100 WBC
PLATELET # BLD AUTO: 554 K/UL (ref 138–453)
PMV BLD AUTO: 9.7 FL (ref 8.1–13.5)
POTASSIUM SERPL-SCNC: 4.3 MMOL/L (ref 3.7–5.3)
PROT SERPL-MCNC: 5.4 G/DL (ref 6.6–8.7)
RBC # BLD AUTO: 3.34 M/UL (ref 3.95–5.11)
SODIUM SERPL-SCNC: 136 MMOL/L (ref 136–145)
WBC OTHER # BLD: 10.9 K/UL (ref 3.5–11.3)

## 2024-10-10 PROCEDURE — 76937 US GUIDE VASCULAR ACCESS: CPT

## 2024-10-10 PROCEDURE — 36415 COLL VENOUS BLD VENIPUNCTURE: CPT

## 2024-10-10 PROCEDURE — 80076 HEPATIC FUNCTION PANEL: CPT

## 2024-10-10 PROCEDURE — 6360000004 HC RX CONTRAST MEDICATION

## 2024-10-10 PROCEDURE — 3E0336Z INTRODUCTION OF NUTRITIONAL SUBSTANCE INTO PERIPHERAL VEIN, PERCUTANEOUS APPROACH: ICD-10-PCS | Performed by: SURGERY

## 2024-10-10 PROCEDURE — 6360000002 HC RX W HCPCS: Performed by: STUDENT IN AN ORGANIZED HEALTH CARE EDUCATION/TRAINING PROGRAM

## 2024-10-10 PROCEDURE — 6370000000 HC RX 637 (ALT 250 FOR IP): Performed by: STUDENT IN AN ORGANIZED HEALTH CARE EDUCATION/TRAINING PROGRAM

## 2024-10-10 PROCEDURE — 80048 BASIC METABOLIC PNL TOTAL CA: CPT

## 2024-10-10 PROCEDURE — 99024 POSTOP FOLLOW-UP VISIT: CPT

## 2024-10-10 PROCEDURE — 2580000003 HC RX 258: Performed by: STUDENT IN AN ORGANIZED HEALTH CARE EDUCATION/TRAINING PROGRAM

## 2024-10-10 PROCEDURE — C1751 CATH, INF, PER/CENT/MIDLINE: HCPCS

## 2024-10-10 PROCEDURE — 36569 INSJ PICC 5 YR+ W/O IMAGING: CPT

## 2024-10-10 PROCEDURE — 82947 ASSAY GLUCOSE BLOOD QUANT: CPT

## 2024-10-10 PROCEDURE — 2500000003 HC RX 250 WO HCPCS: Performed by: STUDENT IN AN ORGANIZED HEALTH CARE EDUCATION/TRAINING PROGRAM

## 2024-10-10 PROCEDURE — 85025 COMPLETE CBC W/AUTO DIFF WBC: CPT

## 2024-10-10 PROCEDURE — 02HV33Z INSERTION OF INFUSION DEVICE INTO SUPERIOR VENA CAVA, PERCUTANEOUS APPROACH: ICD-10-PCS

## 2024-10-10 PROCEDURE — 05H933Z INSERTION OF INFUSION DEVICE INTO RIGHT BRACHIAL VEIN, PERCUTANEOUS APPROACH: ICD-10-PCS

## 2024-10-10 PROCEDURE — 2060000000 HC ICU INTERMEDIATE R&B

## 2024-10-10 PROCEDURE — 74177 CT ABD & PELVIS W/CONTRAST: CPT

## 2024-10-10 PROCEDURE — 6360000002 HC RX W HCPCS: Performed by: NURSE PRACTITIONER

## 2024-10-10 PROCEDURE — 2500000003 HC RX 250 WO HCPCS

## 2024-10-10 PROCEDURE — 2500000003 HC RX 250 WO HCPCS: Performed by: SURGERY

## 2024-10-10 RX ORDER — METOCLOPRAMIDE HYDROCHLORIDE 5 MG/ML
10 INJECTION INTRAMUSCULAR; INTRAVENOUS EVERY 6 HOURS
Status: COMPLETED | OUTPATIENT
Start: 2024-10-10 | End: 2024-10-13

## 2024-10-10 RX ORDER — IOPAMIDOL 755 MG/ML
75 INJECTION, SOLUTION INTRAVASCULAR
Status: COMPLETED | OUTPATIENT
Start: 2024-10-10 | End: 2024-10-10

## 2024-10-10 RX ADMIN — IOPAMIDOL 75 ML: 755 INJECTION, SOLUTION INTRAVENOUS at 12:47

## 2024-10-10 RX ADMIN — OXYCODONE 5 MG: 5 TABLET ORAL at 08:50

## 2024-10-10 RX ADMIN — MORPHINE SULFATE 4 MG: 4 INJECTION INTRAVENOUS at 05:37

## 2024-10-10 RX ADMIN — KETOROLAC TROMETHAMINE 15 MG: 15 INJECTION, SOLUTION INTRAMUSCULAR; INTRAVENOUS at 17:30

## 2024-10-10 RX ADMIN — KETOROLAC TROMETHAMINE 15 MG: 15 INJECTION, SOLUTION INTRAMUSCULAR; INTRAVENOUS at 00:31

## 2024-10-10 RX ADMIN — SODIUM CHLORIDE, PRESERVATIVE FREE 40 MG: 5 INJECTION INTRAVENOUS at 11:47

## 2024-10-10 RX ADMIN — KETOROLAC TROMETHAMINE 15 MG: 15 INJECTION, SOLUTION INTRAMUSCULAR; INTRAVENOUS at 05:59

## 2024-10-10 RX ADMIN — Medication 0.2 MG/KG/HR: at 20:18

## 2024-10-10 RX ADMIN — Medication 0.2 MG/KG/HR: at 18:07

## 2024-10-10 RX ADMIN — LEUCINE, PHENYLALANINE, LYSINE, METHIONINE, ISOLEUCINE, VALINE, HISTIDINE, THREONINE, TRYPTOPHAN, ALANINE, GLYCINE, ARGININE, PROLINE, SERINE, TYROSINE, SODIUM ACETATE, DIBASIC POTASSIUM PHOSPHATE, MAGNESIUM CHLORIDE, SODIUM CHLORIDE, CALCIUM CHLORIDE, DEXTROSE
365; 280; 290; 200; 300; 290; 240; 210; 90; 1035; 515; 575; 340; 250; 20; 340; 261; 51; 59; 33; 20 INJECTION INTRAVENOUS at 17:59

## 2024-10-10 RX ADMIN — SODIUM CHLORIDE, PRESERVATIVE FREE 40 MG: 5 INJECTION INTRAVENOUS at 22:52

## 2024-10-10 RX ADMIN — POTASSIUM CHLORIDE, DEXTROSE MONOHYDRATE AND SODIUM CHLORIDE: 300; 5; 900 INJECTION, SOLUTION INTRAVENOUS at 05:59

## 2024-10-10 RX ADMIN — METOCLOPRAMIDE 10 MG: 5 INJECTION, SOLUTION INTRAMUSCULAR; INTRAVENOUS at 14:11

## 2024-10-10 RX ADMIN — ENOXAPARIN SODIUM 40 MG: 100 INJECTION SUBCUTANEOUS at 09:01

## 2024-10-10 RX ADMIN — OXYCODONE 5 MG: 5 TABLET ORAL at 04:13

## 2024-10-10 RX ADMIN — POTASSIUM CHLORIDE: 2 INJECTION, SOLUTION, CONCENTRATE INTRAVENOUS at 18:23

## 2024-10-10 RX ADMIN — Medication 0.2 MG/KG/HR: at 13:21

## 2024-10-10 RX ADMIN — PIPERACILLIN AND TAZOBACTAM 3375 MG: 3; .375 INJECTION, POWDER, LYOPHILIZED, FOR SOLUTION INTRAVENOUS at 00:35

## 2024-10-10 RX ADMIN — ONDANSETRON 4 MG: 2 INJECTION INTRAMUSCULAR; INTRAVENOUS at 11:47

## 2024-10-10 RX ADMIN — I.V. FAT EMULSION 250 ML: 20 EMULSION INTRAVENOUS at 18:03

## 2024-10-10 RX ADMIN — OXYCODONE 5 MG: 5 TABLET ORAL at 17:30

## 2024-10-10 RX ADMIN — KETOROLAC TROMETHAMINE 15 MG: 15 INJECTION, SOLUTION INTRAMUSCULAR; INTRAVENOUS at 23:28

## 2024-10-10 RX ADMIN — IOHEXOL 50 ML: 240 INJECTION, SOLUTION INTRATHECAL; INTRAVASCULAR; INTRAVENOUS; ORAL at 11:00

## 2024-10-10 RX ADMIN — METOCLOPRAMIDE 10 MG: 5 INJECTION, SOLUTION INTRAMUSCULAR; INTRAVENOUS at 20:14

## 2024-10-10 RX ADMIN — OXYCODONE 5 MG: 5 TABLET ORAL at 23:29

## 2024-10-10 RX ADMIN — PIPERACILLIN AND TAZOBACTAM 3375 MG: 3; .375 INJECTION, POWDER, LYOPHILIZED, FOR SOLUTION INTRAVENOUS at 11:16

## 2024-10-10 RX ADMIN — PIPERACILLIN AND TAZOBACTAM 3375 MG: 3; .375 INJECTION, POWDER, LYOPHILIZED, FOR SOLUTION INTRAVENOUS at 18:48

## 2024-10-10 RX ADMIN — KETOROLAC TROMETHAMINE 15 MG: 15 INJECTION, SOLUTION INTRAMUSCULAR; INTRAVENOUS at 11:47

## 2024-10-10 ASSESSMENT — PAIN DESCRIPTION - ORIENTATION
ORIENTATION: ANTERIOR
ORIENTATION: MID;LOWER
ORIENTATION: LEFT

## 2024-10-10 ASSESSMENT — PAIN DESCRIPTION - LOCATION
LOCATION: ABDOMEN

## 2024-10-10 ASSESSMENT — PAIN DESCRIPTION - PAIN TYPE: TYPE: SURGICAL PAIN;ACUTE PAIN

## 2024-10-10 ASSESSMENT — PAIN DESCRIPTION - DESCRIPTORS
DESCRIPTORS: ACHING
DESCRIPTORS: ACHING

## 2024-10-10 ASSESSMENT — PAIN SCALES - GENERAL
PAINLEVEL_OUTOF10: 9
PAINLEVEL_OUTOF10: 5
PAINLEVEL_OUTOF10: 10
PAINLEVEL_OUTOF10: 9
PAINLEVEL_OUTOF10: 8
PAINLEVEL_OUTOF10: 6
PAINLEVEL_OUTOF10: 9

## 2024-10-10 ASSESSMENT — PAIN DESCRIPTION - FREQUENCY: FREQUENCY: CONTINUOUS

## 2024-10-10 ASSESSMENT — PAIN - FUNCTIONAL ASSESSMENT: PAIN_FUNCTIONAL_ASSESSMENT: PREVENTS OR INTERFERES SOME ACTIVE ACTIVITIES AND ADLS

## 2024-10-10 ASSESSMENT — PAIN DESCRIPTION - ONSET: ONSET: ON-GOING

## 2024-10-10 NOTE — ANESTHESIA POSTPROCEDURE EVALUATION
Department of Anesthesiology  Postprocedure Note    Patient: Eunice Ortega  MRN: [id number]  YOB: 1975  Date of evaluation: 10/9/2024    Procedure Summary       Date: 10/04/24 Room / Location: [address]    Anesthesia Start: 1935 Anesthesia Stop: 2325    Procedure: LAPAROTOMY EXPLORATORY, SIGMOIDECTOMY, APPENDCTOMY, END COLOSTOMY (Abdomen) Diagnosis:       Small bowel obstruction (HCC)      (Small bowel obstruction (HCC) [K56.609])    Surgeons: Geovanni Lopez MD Responsible Provider: Corey Busch MD    Anesthesia Type: general ASA Status: 3 - Emergent            Anesthesia Type: No value filed.    Gaby Phase I: Gaby Score: 9    Gaby Phase II:      Anesthesia Post Evaluation    Patient location during evaluation: PACU  Patient participation: complete - patient participated  Level of consciousness: awake  Airway patency: patent  Nausea & Vomiting: no nausea and no vomiting  Cardiovascular status: blood pressure returned to baseline  Respiratory status: acceptable  Hydration status: euvolemic  Comments: /79   Pulse 76   Temp 97.9 °F (36.6 °C) (Oral)   Resp 20   Ht 1.6 m (5' 2.99\")   Wt 66.5 kg (146 lb 9.7 oz)   SpO2 95%   BMI 25.98 kg/m²   Pain management: adequate        No notable events documented.

## 2024-10-10 NOTE — CONSULTS
General Surgery  Consult    PATIENT NAME: Eunice Ortega  AGE: 49 y.o.  MEDICAL RECORD NO. [id number]  DATE: 10/4/2024  SURGEON: Dr. Lopez   PRIMARY CARE PHYSICIAN: No primary care provider on file.    Patient evaluated at the request of  Dr. Dr. Ford   Reason for evaluation: Acute diverticulitis     Patient information was obtained from patient.  History/Exam limitations: none.    IMPRESSION:     Patient Active Problem List   Diagnosis    Acute diverticulitis    Acute cystitis without hematuria    Anemia    B12 deficiency    Nephrolithiasis    Hypotension     49-year-old female who presented with abdominal pain and was found to have acute diverticulitis      PLAN:   Patient seen and examined at bedside.  Labs, imaging, history and physical exam findings discussed with on-call attending Dr. Lopez.  Given patient's continued abdominal pain, uptrending leukocytosis and overall clinical picture decision was made to repeat her imaging with IV, oral, and rectal contrast.  These images were reviewed and are very concerning given picture of small bowel obstruction with significant increase in the amount of free fluid within her abdomen.  We will proceed to the operating room for exploratory laparotomy, possible bowel resection, possible ostomy creation, and possible temporary abdominal closure.  The risk, benefits, and alternatives to surgery were discussed with the patient, and all questions were answered.  Written consent was obtained witnessed.  NPO  Maintenance IV fluid  Continue IV antibiotics  Postoperative orders to follow      HISTORY:   History of Chief Complaint:    Eunice Ortega is a 49 y.o. female who presented to the hospital on 10/4/2024 with abdominal pain.  She underwent CT imaging without contrast at that time that demonstrated acute sigmoid diverticulitis without evidence for gross perforation, abscess, or bowel obstruction.  She was noted to have free fluid within the pelvis.  She was admitted to the 
medical condition, GI abnormality    Nutrition Interventions:   Food and/or Nutrient Delivery: Start Parenteral Nutrition  Nutrition Education/Counseling: No recommendation at this time  Coordination of Nutrition Care: Continue to monitor while inpatient  Plan of Care discussed with: NP, pharmacist, RN    Goals:  Previous Goal Met: Progressing toward Goal(s)  Goals: Meet at least 75% of estimated needs, prior to discharge    Nutrition Monitoring and Evaluation:   Behavioral-Environmental Outcomes: None Identified  Food/Nutrient Intake Outcomes: Parenteral Nutrition Intake/Tolerance, Diet Advancement/Tolerance  Physical Signs/Symptoms Outcomes: Biochemical Data, Nutrition Focused Physical Findings, Weight, GI Status    Discharge Planning:    Too soon to determine     Kim Rodrigues RD  Contact: [phone]

## 2024-10-10 NOTE — PROCEDURES
PROCEDURE NOTE  Date: 10/10/2024   Name: Eunice Ortega  YOB: 1975    Procedures    Picc placement order:    Benefits include stable, long term intravenous access. Blood draws. Peripheral vein preservation. Safely deliver vesicant medications.    Risks include failure to obtain the desired result(s) of the procedure, discomfort, injury, the need for additional procedures/therapies, permanent loss of body function, bleeding/bruising, arterial puncture, air embolism, nerve damage, hematoma, phlebitis, catheter fracture/rupture, catheter embolism, catheter occlusion, catheter migration, catheter site infection, unintentional/accidental removal of catheter, bloodstream infection, infiltration, cardiac arrhythmia, vein thrombosis, difficult catheter removal.   Alternatives discussed including centrally inserted central catheter, as well as less invasive procedures such as multiple peripheral IVs, extended dwell catheters and midline placements.     Consent obtained by proceduralist, signed by Patient/DPOA.      Prescribed therapy: TPN infusion per [facility].   Peripheral ultrasound assessment done. Plan for Right Brachial vein insertion.   Vein measurement = 0.55 cm and area based CVR= 9.5%, preferable CVR based on area would be less than 20% (which correlates to less than 45% linear CVR).  Product type: Arrow non tapered power injectable PICC  History/Labs/Allergies Reviewed  Placed By: Siddhartha WALKER RN   Assistant: Nikko WALKER RN  Time out Performed using Two Identifiers  Lot #: [id number]  Expiration date: 11/30/2025  Catheter info: 5 [place] -Dual lumen  Total length: 34 cm  External catheter length: 1 cm  Extremity circumference at site = 31 cm  Number of attempts: 1  Estimated blood loss: 1 ml  Placement verified by: positive blood return & flushes easily  Special equipment used: [facility] ECG Tip tracker system, ultrasound, MST, and micro-introducer needle.   Catheter securement: StatLock securement

## 2024-10-10 NOTE — CARE COORDINATION
[facility] Quality Flow/Interdisciplinary Rounds Progress Note    Quality Flow Rounds held on October 10, 2024 at 0930    Disciplines Attending:  Bedside Nurse, , and Nursing Unit Leadership    Barriers to Discharge: medical condition, ostomy, TPN    Anticipated Discharge Date:   unknown    Anticipated Discharge Disposition: Home with [facility]    Readmission Risk              Risk of Unplanned Readmission:  10           Discussed patient goal for the day, patient clinical progression, and barriers to discharge.  The following Goal(s) of the Day/Commitment(s) have been identified:   New colostomy- no output (MD aware), plan is PICC for TPN, has wound vac and NG.      KRISTINA THRASHER  October 10, 2024

## 2024-10-11 LAB
ALBUMIN SERPL-MCNC: 2.7 G/DL (ref 3.5–5.2)
ALBUMIN/GLOB SERPL: 1 {RATIO} (ref 1–2.5)
ALP SERPL-CCNC: 118 U/L (ref 35–104)
ALT SERPL-CCNC: 15 U/L (ref 10–35)
ANION GAP SERPL CALCULATED.3IONS-SCNC: 10 MMOL/L (ref 9–16)
AST SERPL-CCNC: 22 U/L (ref 10–35)
BASOPHILS # BLD: 0 K/UL (ref 0–0.2)
BASOPHILS NFR BLD: 0 % (ref 0–2)
BILIRUB DIRECT SERPL-MCNC: 0.2 MG/DL (ref 0–0.2)
BILIRUB INDIRECT SERPL-MCNC: 0.1 MG/DL (ref 0–1)
BILIRUB SERPL-MCNC: 0.3 MG/DL (ref 0–1.2)
BUN SERPL-MCNC: 6 MG/DL (ref 6–20)
CALCIUM SERPL-MCNC: 7.9 MG/DL (ref 8.6–10.4)
CHLORIDE SERPL-SCNC: 104 MMOL/L (ref 98–107)
CO2 SERPL-SCNC: 22 MMOL/L (ref 20–31)
CREAT SERPL-MCNC: 0.7 MG/DL (ref 0.5–0.9)
EOSINOPHIL # BLD: 0.38 K/UL (ref 0–0.4)
EOSINOPHILS RELATIVE PERCENT: 3 % (ref 1–4)
ERYTHROCYTE [DISTWIDTH] IN BLOOD BY AUTOMATED COUNT: 20.4 % (ref 11.8–14.4)
GFR, ESTIMATED: >90 ML/MIN/1.73M2
GLOBULIN SER CALC-MCNC: 3.1 G/DL
GLUCOSE BLD-MCNC: 127 MG/DL (ref 65–105)
GLUCOSE BLD-MCNC: 129 MG/DL (ref 65–105)
GLUCOSE BLD-MCNC: 132 MG/DL (ref 65–105)
GLUCOSE BLD-MCNC: 135 MG/DL (ref 65–105)
GLUCOSE SERPL-MCNC: 136 MG/DL (ref 74–99)
HCT VFR BLD AUTO: 28 % (ref 36.3–47.1)
HGB BLD-MCNC: 8.2 G/DL (ref 11.9–15.1)
IMM GRANULOCYTES # BLD AUTO: 0 K/UL (ref 0–0.3)
IMM GRANULOCYTES NFR BLD: 0 %
LYMPHOCYTES NFR BLD: 1.25 K/UL (ref 1–4.8)
LYMPHOCYTES RELATIVE PERCENT: 10 % (ref 24–44)
MAGNESIUM SERPL-MCNC: 2.1 MG/DL (ref 1.6–2.6)
MCH RBC QN AUTO: 24.1 PG (ref 25.2–33.5)
MCHC RBC AUTO-ENTMCNC: 29.3 G/DL (ref 28.4–34.8)
MCV RBC AUTO: 82.4 FL (ref 82.6–102.9)
MONOCYTES NFR BLD: 1 K/UL (ref 0.1–0.8)
MONOCYTES NFR BLD: 8 % (ref 1–7)
MORPHOLOGY: ABNORMAL
NEUTROPHILS NFR BLD: 79 % (ref 36–66)
NEUTS SEG NFR BLD: 9.87 K/UL (ref 1.8–7.7)
NRBC BLD-RTO: 0 PER 100 WBC
PHOSPHATE SERPL-MCNC: 3.8 MG/DL (ref 2.5–4.5)
PLATELET # BLD AUTO: 624 K/UL (ref 138–453)
PMV BLD AUTO: 9.3 FL (ref 8.1–13.5)
POTASSIUM SERPL-SCNC: 3.9 MMOL/L (ref 3.7–5.3)
PROT SERPL-MCNC: 5.8 G/DL (ref 6.6–8.7)
RBC # BLD AUTO: 3.4 M/UL (ref 3.95–5.11)
SODIUM SERPL-SCNC: 136 MMOL/L (ref 136–145)
TRIGL SERPL-MCNC: 142 MG/DL
WBC OTHER # BLD: 12.5 K/UL (ref 3.5–11.3)

## 2024-10-11 PROCEDURE — 85025 COMPLETE CBC W/AUTO DIFF WBC: CPT

## 2024-10-11 PROCEDURE — 6360000002 HC RX W HCPCS: Performed by: STUDENT IN AN ORGANIZED HEALTH CARE EDUCATION/TRAINING PROGRAM

## 2024-10-11 PROCEDURE — 6360000002 HC RX W HCPCS: Performed by: NURSE PRACTITIONER

## 2024-10-11 PROCEDURE — 2580000003 HC RX 258: Performed by: STUDENT IN AN ORGANIZED HEALTH CARE EDUCATION/TRAINING PROGRAM

## 2024-10-11 PROCEDURE — 83735 ASSAY OF MAGNESIUM: CPT

## 2024-10-11 PROCEDURE — 6370000000 HC RX 637 (ALT 250 FOR IP): Performed by: STUDENT IN AN ORGANIZED HEALTH CARE EDUCATION/TRAINING PROGRAM

## 2024-10-11 PROCEDURE — 99024 POSTOP FOLLOW-UP VISIT: CPT | Performed by: PHYSICIAN ASSISTANT

## 2024-10-11 PROCEDURE — 94761 N-INVAS EAR/PLS OXIMETRY MLT: CPT

## 2024-10-11 PROCEDURE — 80048 BASIC METABOLIC PNL TOTAL CA: CPT

## 2024-10-11 PROCEDURE — 82947 ASSAY GLUCOSE BLOOD QUANT: CPT

## 2024-10-11 PROCEDURE — 97606 NEG PRS WND THER DME>50 SQCM: CPT

## 2024-10-11 PROCEDURE — 84100 ASSAY OF PHOSPHORUS: CPT

## 2024-10-11 PROCEDURE — 36415 COLL VENOUS BLD VENIPUNCTURE: CPT

## 2024-10-11 PROCEDURE — 2060000000 HC ICU INTERMEDIATE R&B

## 2024-10-11 PROCEDURE — 80076 HEPATIC FUNCTION PANEL: CPT

## 2024-10-11 PROCEDURE — 84478 ASSAY OF TRIGLYCERIDES: CPT

## 2024-10-11 PROCEDURE — 2500000003 HC RX 250 WO HCPCS: Performed by: SURGERY

## 2024-10-11 RX ORDER — FENTANYL CITRATE 50 UG/ML
50 INJECTION, SOLUTION INTRAMUSCULAR; INTRAVENOUS ONCE
Status: COMPLETED | OUTPATIENT
Start: 2024-10-11 | End: 2024-10-11

## 2024-10-11 RX ADMIN — METOCLOPRAMIDE 10 MG: 5 INJECTION, SOLUTION INTRAMUSCULAR; INTRAVENOUS at 13:51

## 2024-10-11 RX ADMIN — OXYCODONE 5 MG: 5 TABLET ORAL at 05:21

## 2024-10-11 RX ADMIN — KETOROLAC TROMETHAMINE 15 MG: 15 INJECTION, SOLUTION INTRAMUSCULAR; INTRAVENOUS at 05:21

## 2024-10-11 RX ADMIN — ENOXAPARIN SODIUM 40 MG: 100 INJECTION SUBCUTANEOUS at 08:43

## 2024-10-11 RX ADMIN — OXYCODONE 5 MG: 5 TABLET ORAL at 16:31

## 2024-10-11 RX ADMIN — ASCORBIC ACID, VITAMIN A PALMITATE, CHOLECALCIFEROL, THIAMINE HYDROCHLORIDE, RIBOFLAVIN-5 PHOSPHATE SODIUM, PYRIDOXINE HYDROCHLORIDE, NIACINAMIDE, DEXPANTHENOL, ALPHA-TOCOPHEROL ACETATE, VITAMIN K1, FOLIC ACID, BIOTIN, CYANOCOBALAMIN: 200; 3300; 200; 6; 3.6; 6; 40; 15; 10; 150; 600; 60; 5 INJECTION, SOLUTION INTRAVENOUS at 18:18

## 2024-10-11 RX ADMIN — METOCLOPRAMIDE 10 MG: 5 INJECTION, SOLUTION INTRAMUSCULAR; INTRAVENOUS at 21:17

## 2024-10-11 RX ADMIN — FENTANYL CITRATE 50 MCG: 50 INJECTION, SOLUTION INTRAMUSCULAR; INTRAVENOUS at 13:50

## 2024-10-11 RX ADMIN — PIPERACILLIN AND TAZOBACTAM 3375 MG: 3; .375 INJECTION, POWDER, LYOPHILIZED, FOR SOLUTION INTRAVENOUS at 00:29

## 2024-10-11 RX ADMIN — OXYCODONE 5 MG: 5 TABLET ORAL at 21:16

## 2024-10-11 RX ADMIN — I.V. FAT EMULSION 250 ML: 20 EMULSION INTRAVENOUS at 18:22

## 2024-10-11 RX ADMIN — OXYCODONE 5 MG: 5 TABLET ORAL at 11:43

## 2024-10-11 RX ADMIN — KETOROLAC TROMETHAMINE 15 MG: 15 INJECTION, SOLUTION INTRAMUSCULAR; INTRAVENOUS at 11:42

## 2024-10-11 RX ADMIN — SODIUM CHLORIDE, PRESERVATIVE FREE 40 MG: 5 INJECTION INTRAVENOUS at 11:43

## 2024-10-11 RX ADMIN — METOCLOPRAMIDE 10 MG: 5 INJECTION, SOLUTION INTRAMUSCULAR; INTRAVENOUS at 01:49

## 2024-10-11 RX ADMIN — PIPERACILLIN AND TAZOBACTAM 3375 MG: 3; .375 INJECTION, POWDER, LYOPHILIZED, FOR SOLUTION INTRAVENOUS at 08:47

## 2024-10-11 RX ADMIN — KETOROLAC TROMETHAMINE 15 MG: 15 INJECTION, SOLUTION INTRAMUSCULAR; INTRAVENOUS at 18:13

## 2024-10-11 RX ADMIN — PIPERACILLIN AND TAZOBACTAM 3375 MG: 3; .375 INJECTION, POWDER, LYOPHILIZED, FOR SOLUTION INTRAVENOUS at 16:35

## 2024-10-11 RX ADMIN — SODIUM CHLORIDE, PRESERVATIVE FREE 10 ML: 5 INJECTION INTRAVENOUS at 11:51

## 2024-10-11 RX ADMIN — METOCLOPRAMIDE 10 MG: 5 INJECTION, SOLUTION INTRAMUSCULAR; INTRAVENOUS at 08:48

## 2024-10-11 ASSESSMENT — PAIN DESCRIPTION - DESCRIPTORS
DESCRIPTORS: ACHING
DESCRIPTORS: CRAMPING;BURNING
DESCRIPTORS: ACHING
DESCRIPTORS: ACHING

## 2024-10-11 ASSESSMENT — PAIN SCALES - GENERAL
PAINLEVEL_OUTOF10: 10
PAINLEVEL_OUTOF10: 7
PAINLEVEL_OUTOF10: 10

## 2024-10-11 ASSESSMENT — PAIN DESCRIPTION - LOCATION
LOCATION: ABDOMEN

## 2024-10-11 ASSESSMENT — PAIN DESCRIPTION - ORIENTATION
ORIENTATION: MID
ORIENTATION: MID
ORIENTATION: LEFT

## 2024-10-12 LAB
ALBUMIN SERPL-MCNC: 2.7 G/DL (ref 3.5–5.2)
ALBUMIN/GLOB SERPL: 1 {RATIO} (ref 1–2.5)
ALP SERPL-CCNC: 121 U/L (ref 35–104)
ALT SERPL-CCNC: 20 U/L (ref 10–35)
ANION GAP SERPL CALCULATED.3IONS-SCNC: 9 MMOL/L (ref 9–16)
AST SERPL-CCNC: 25 U/L (ref 10–35)
BASOPHILS # BLD: 0 K/UL (ref 0–0.2)
BASOPHILS NFR BLD: 0 % (ref 0–2)
BILIRUB DIRECT SERPL-MCNC: 0.3 MG/DL (ref 0–0.2)
BILIRUB INDIRECT SERPL-MCNC: 0.1 MG/DL (ref 0–1)
BILIRUB SERPL-MCNC: 0.3 MG/DL (ref 0–1.2)
BUN SERPL-MCNC: 6 MG/DL (ref 6–20)
CALCIUM SERPL-MCNC: 8 MG/DL (ref 8.6–10.4)
CHLORIDE SERPL-SCNC: 106 MMOL/L (ref 98–107)
CO2 SERPL-SCNC: 22 MMOL/L (ref 20–31)
CREAT SERPL-MCNC: 0.7 MG/DL (ref 0.5–0.9)
EOSINOPHIL # BLD: 0.57 K/UL (ref 0–0.4)
EOSINOPHILS RELATIVE PERCENT: 5 % (ref 1–4)
ERYTHROCYTE [DISTWIDTH] IN BLOOD BY AUTOMATED COUNT: 20.8 % (ref 11.8–14.4)
GFR, ESTIMATED: >90 ML/MIN/1.73M2
GLOBULIN SER CALC-MCNC: 2.9 G/DL
GLUCOSE BLD-MCNC: 115 MG/DL (ref 65–105)
GLUCOSE BLD-MCNC: 122 MG/DL (ref 65–105)
GLUCOSE BLD-MCNC: 145 MG/DL (ref 65–105)
GLUCOSE BLD-MCNC: 99 MG/DL (ref 65–105)
GLUCOSE SERPL-MCNC: 122 MG/DL (ref 74–99)
HCT VFR BLD AUTO: 28.8 % (ref 36.3–47.1)
HGB BLD-MCNC: 8.2 G/DL (ref 11.9–15.1)
IMM GRANULOCYTES # BLD AUTO: 0.11 K/UL (ref 0–0.3)
IMM GRANULOCYTES NFR BLD: 1 %
LYMPHOCYTES NFR BLD: 1.14 K/UL (ref 1–4.8)
LYMPHOCYTES RELATIVE PERCENT: 10 % (ref 24–44)
MAGNESIUM SERPL-MCNC: 1.9 MG/DL (ref 1.6–2.6)
MCH RBC QN AUTO: 24.6 PG (ref 25.2–33.5)
MCHC RBC AUTO-ENTMCNC: 28.5 G/DL (ref 28.4–34.8)
MCV RBC AUTO: 86.5 FL (ref 82.6–102.9)
MONOCYTES NFR BLD: 0.91 K/UL (ref 0.1–0.8)
MONOCYTES NFR BLD: 8 % (ref 1–7)
MORPHOLOGY: ABNORMAL
NEUTROPHILS NFR BLD: 76 % (ref 36–66)
NEUTS SEG NFR BLD: 8.67 K/UL (ref 1.8–7.7)
NRBC BLD-RTO: 0.2 PER 100 WBC
PHOSPHATE SERPL-MCNC: 3.5 MG/DL (ref 2.5–4.5)
PLATELET # BLD AUTO: 651 K/UL (ref 138–453)
PMV BLD AUTO: 9.3 FL (ref 8.1–13.5)
POTASSIUM SERPL-SCNC: 3.9 MMOL/L (ref 3.7–5.3)
PROT SERPL-MCNC: 5.6 G/DL (ref 6.6–8.7)
RBC # BLD AUTO: 3.33 M/UL (ref 3.95–5.11)
SODIUM SERPL-SCNC: 137 MMOL/L (ref 136–145)
WBC OTHER # BLD: 11.4 K/UL (ref 3.5–11.3)

## 2024-10-12 PROCEDURE — 6360000002 HC RX W HCPCS: Performed by: STUDENT IN AN ORGANIZED HEALTH CARE EDUCATION/TRAINING PROGRAM

## 2024-10-12 PROCEDURE — 6370000000 HC RX 637 (ALT 250 FOR IP): Performed by: STUDENT IN AN ORGANIZED HEALTH CARE EDUCATION/TRAINING PROGRAM

## 2024-10-12 PROCEDURE — 83735 ASSAY OF MAGNESIUM: CPT

## 2024-10-12 PROCEDURE — 99024 POSTOP FOLLOW-UP VISIT: CPT | Performed by: SURGERY

## 2024-10-12 PROCEDURE — 85025 COMPLETE CBC W/AUTO DIFF WBC: CPT

## 2024-10-12 PROCEDURE — 80048 BASIC METABOLIC PNL TOTAL CA: CPT

## 2024-10-12 PROCEDURE — 2580000003 HC RX 258: Performed by: STUDENT IN AN ORGANIZED HEALTH CARE EDUCATION/TRAINING PROGRAM

## 2024-10-12 PROCEDURE — 6360000002 HC RX W HCPCS: Performed by: NURSE PRACTITIONER

## 2024-10-12 PROCEDURE — 2060000000 HC ICU INTERMEDIATE R&B

## 2024-10-12 PROCEDURE — 80076 HEPATIC FUNCTION PANEL: CPT

## 2024-10-12 PROCEDURE — 84100 ASSAY OF PHOSPHORUS: CPT

## 2024-10-12 PROCEDURE — 36415 COLL VENOUS BLD VENIPUNCTURE: CPT

## 2024-10-12 PROCEDURE — 2500000003 HC RX 250 WO HCPCS

## 2024-10-12 PROCEDURE — 2500000003 HC RX 250 WO HCPCS: Performed by: SURGERY

## 2024-10-12 RX ORDER — ACETAMINOPHEN 500 MG
1000 TABLET ORAL EVERY 8 HOURS SCHEDULED
Status: DISCONTINUED | OUTPATIENT
Start: 2024-10-12 | End: 2024-10-16 | Stop reason: HOSPADM

## 2024-10-12 RX ORDER — METHOCARBAMOL 750 MG/1
750 TABLET, FILM COATED ORAL EVERY 6 HOURS
Status: DISCONTINUED | OUTPATIENT
Start: 2024-10-12 | End: 2024-10-16 | Stop reason: HOSPADM

## 2024-10-12 RX ADMIN — Medication 0.2 MG/KG/HR: at 09:49

## 2024-10-12 RX ADMIN — ENOXAPARIN SODIUM 40 MG: 100 INJECTION SUBCUTANEOUS at 09:51

## 2024-10-12 RX ADMIN — METHOCARBAMOL 750 MG: 750 TABLET ORAL at 12:00

## 2024-10-12 RX ADMIN — SODIUM CHLORIDE, PRESERVATIVE FREE 40 MG: 5 INJECTION INTRAVENOUS at 00:11

## 2024-10-12 RX ADMIN — OXYCODONE 5 MG: 5 TABLET ORAL at 16:48

## 2024-10-12 RX ADMIN — METOCLOPRAMIDE 10 MG: 5 INJECTION, SOLUTION INTRAMUSCULAR; INTRAVENOUS at 01:21

## 2024-10-12 RX ADMIN — KETOROLAC TROMETHAMINE 15 MG: 15 INJECTION, SOLUTION INTRAMUSCULAR; INTRAVENOUS at 06:47

## 2024-10-12 RX ADMIN — I.V. FAT EMULSION 250 ML: 20 EMULSION INTRAVENOUS at 18:24

## 2024-10-12 RX ADMIN — METOCLOPRAMIDE 10 MG: 5 INJECTION, SOLUTION INTRAMUSCULAR; INTRAVENOUS at 09:51

## 2024-10-12 RX ADMIN — POTASSIUM CHLORIDE: 2 INJECTION, SOLUTION, CONCENTRATE INTRAVENOUS at 09:50

## 2024-10-12 RX ADMIN — PIPERACILLIN SODIUM AND TAZOBACTAM SODIUM 3375 MG: 3; .375 INJECTION, SOLUTION INTRAVENOUS at 19:01

## 2024-10-12 RX ADMIN — OXYCODONE 5 MG: 5 TABLET ORAL at 01:21

## 2024-10-12 RX ADMIN — SODIUM CHLORIDE, PRESERVATIVE FREE 40 MG: 5 INJECTION INTRAVENOUS at 10:59

## 2024-10-12 RX ADMIN — METOCLOPRAMIDE 10 MG: 5 INJECTION, SOLUTION INTRAMUSCULAR; INTRAVENOUS at 20:01

## 2024-10-12 RX ADMIN — ACETAMINOPHEN 1000 MG: 500 TABLET ORAL at 22:09

## 2024-10-12 RX ADMIN — METOCLOPRAMIDE 10 MG: 5 INJECTION, SOLUTION INTRAMUSCULAR; INTRAVENOUS at 14:09

## 2024-10-12 RX ADMIN — METHOCARBAMOL 750 MG: 750 TABLET ORAL at 18:24

## 2024-10-12 RX ADMIN — KETOROLAC TROMETHAMINE 15 MG: 15 INJECTION, SOLUTION INTRAMUSCULAR; INTRAVENOUS at 00:11

## 2024-10-12 RX ADMIN — PIPERACILLIN AND TAZOBACTAM 3375 MG: 3; .375 INJECTION, POWDER, LYOPHILIZED, FOR SOLUTION INTRAVENOUS at 00:16

## 2024-10-12 RX ADMIN — PIPERACILLIN AND TAZOBACTAM 3375 MG: 3; .375 INJECTION, POWDER, LYOPHILIZED, FOR SOLUTION INTRAVENOUS at 10:08

## 2024-10-12 RX ADMIN — OXYCODONE 5 MG: 5 TABLET ORAL at 10:06

## 2024-10-12 RX ADMIN — OXYCODONE 5 MG: 5 TABLET ORAL at 20:34

## 2024-10-12 RX ADMIN — LEUCINE, PHENYLALANINE, LYSINE, METHIONINE, ISOLEUCINE, VALINE, HISTIDINE, THREONINE, TRYPTOPHAN, ALANINE, GLYCINE, ARGININE, PROLINE, SERINE, TYROSINE, SODIUM ACETATE, DIBASIC POTASSIUM PHOSPHATE, MAGNESIUM CHLORIDE, SODIUM CHLORIDE, CALCIUM CHLORIDE, DEXTROSE
365; 280; 290; 200; 300; 290; 240; 210; 90; 1035; 515; 575; 340; 250; 20; 340; 261; 51; 59; 33; 20 INJECTION INTRAVENOUS at 18:23

## 2024-10-12 RX ADMIN — ACETAMINOPHEN 1000 MG: 500 TABLET ORAL at 14:09

## 2024-10-12 ASSESSMENT — PAIN SCALES - GENERAL
PAINLEVEL_OUTOF10: 10
PAINLEVEL_OUTOF10: 6
PAINLEVEL_OUTOF10: 6
PAINLEVEL_OUTOF10: 10
PAINLEVEL_OUTOF10: 10
PAINLEVEL_OUTOF10: 8
PAINLEVEL_OUTOF10: 9
PAINLEVEL_OUTOF10: 8
PAINLEVEL_OUTOF10: 9
PAINLEVEL_OUTOF10: 6
PAINLEVEL_OUTOF10: 10
PAINLEVEL_OUTOF10: 7
PAINLEVEL_OUTOF10: 10

## 2024-10-12 ASSESSMENT — PAIN DESCRIPTION - LOCATION
LOCATION: ABDOMEN

## 2024-10-12 ASSESSMENT — PAIN DESCRIPTION - DESCRIPTORS
DESCRIPTORS: BURNING;ACHING;CRAMPING
DESCRIPTORS: ACHING;BURNING;CRAMPING

## 2024-10-13 LAB
ALBUMIN SERPL-MCNC: 2.9 G/DL (ref 3.5–5.2)
ALBUMIN/GLOB SERPL: 1 {RATIO} (ref 1–2.5)
ALP SERPL-CCNC: 150 U/L (ref 35–104)
ALT SERPL-CCNC: 28 U/L (ref 10–35)
ANION GAP SERPL CALCULATED.3IONS-SCNC: 12 MMOL/L (ref 9–16)
AST SERPL-CCNC: 36 U/L (ref 10–35)
BASOPHILS # BLD: 0.13 K/UL (ref 0–0.2)
BASOPHILS NFR BLD: 1 % (ref 0–2)
BILIRUB DIRECT SERPL-MCNC: 0.2 MG/DL (ref 0–0.2)
BILIRUB INDIRECT SERPL-MCNC: 0.1 MG/DL (ref 0–1)
BILIRUB SERPL-MCNC: 0.3 MG/DL (ref 0–1.2)
BUN SERPL-MCNC: 7 MG/DL (ref 6–20)
CALCIUM SERPL-MCNC: 8 MG/DL (ref 8.6–10.4)
CHLORIDE SERPL-SCNC: 107 MMOL/L (ref 98–107)
CO2 SERPL-SCNC: 19 MMOL/L (ref 20–31)
CREAT SERPL-MCNC: 0.6 MG/DL (ref 0.5–0.9)
EOSINOPHIL # BLD: 0.51 K/UL (ref 0–0.44)
EOSINOPHILS RELATIVE PERCENT: 4 % (ref 1–4)
ERYTHROCYTE [DISTWIDTH] IN BLOOD BY AUTOMATED COUNT: 21.2 % (ref 11.8–14.4)
GFR, ESTIMATED: >90 ML/MIN/1.73M2
GLOBULIN SER CALC-MCNC: 3.5 G/DL
GLUCOSE BLD-MCNC: 109 MG/DL (ref 65–105)
GLUCOSE BLD-MCNC: 110 MG/DL (ref 65–105)
GLUCOSE BLD-MCNC: 111 MG/DL (ref 65–105)
GLUCOSE BLD-MCNC: 120 MG/DL (ref 65–105)
GLUCOSE SERPL-MCNC: 149 MG/DL (ref 74–99)
HCT VFR BLD AUTO: 31.1 % (ref 36.3–47.1)
HGB BLD-MCNC: 9.2 G/DL (ref 11.9–15.1)
IMM GRANULOCYTES # BLD AUTO: 0.13 K/UL (ref 0–0.3)
IMM GRANULOCYTES NFR BLD: 1 %
LYMPHOCYTES NFR BLD: 1.4 K/UL (ref 1.1–3.7)
LYMPHOCYTES RELATIVE PERCENT: 11 % (ref 24–43)
MAGNESIUM SERPL-MCNC: 1.9 MG/DL (ref 1.6–2.6)
MAGNESIUM SERPL-MCNC: 1.9 MG/DL (ref 1.6–2.6)
MCH RBC QN AUTO: 25.3 PG (ref 25.2–33.5)
MCHC RBC AUTO-ENTMCNC: 29.6 G/DL (ref 28.4–34.8)
MCV RBC AUTO: 85.7 FL (ref 82.6–102.9)
MONOCYTES NFR BLD: 0.76 K/UL (ref 0.1–1.2)
MONOCYTES NFR BLD: 6 % (ref 3–12)
MORPHOLOGY: ABNORMAL
NEUTROPHILS NFR BLD: 77 % (ref 36–65)
NEUTS SEG NFR BLD: 9.77 K/UL (ref 1.5–8.1)
NRBC BLD-RTO: 0 PER 100 WBC
PHOSPHATE SERPL-MCNC: 3.6 MG/DL (ref 2.5–4.5)
PLATELET # BLD AUTO: 762 K/UL (ref 138–453)
PMV BLD AUTO: 9.7 FL (ref 8.1–13.5)
POTASSIUM SERPL-SCNC: 3.9 MMOL/L (ref 3.7–5.3)
PROT SERPL-MCNC: 6.4 G/DL (ref 6.6–8.7)
RBC # BLD AUTO: 3.63 M/UL (ref 3.95–5.11)
SODIUM SERPL-SCNC: 138 MMOL/L (ref 136–145)
WBC OTHER # BLD: 12.7 K/UL (ref 3.5–11.3)

## 2024-10-13 PROCEDURE — 6360000002 HC RX W HCPCS: Performed by: STUDENT IN AN ORGANIZED HEALTH CARE EDUCATION/TRAINING PROGRAM

## 2024-10-13 PROCEDURE — 6370000000 HC RX 637 (ALT 250 FOR IP): Performed by: STUDENT IN AN ORGANIZED HEALTH CARE EDUCATION/TRAINING PROGRAM

## 2024-10-13 PROCEDURE — 80076 HEPATIC FUNCTION PANEL: CPT

## 2024-10-13 PROCEDURE — 85025 COMPLETE CBC W/AUTO DIFF WBC: CPT

## 2024-10-13 PROCEDURE — 84100 ASSAY OF PHOSPHORUS: CPT

## 2024-10-13 PROCEDURE — 80048 BASIC METABOLIC PNL TOTAL CA: CPT

## 2024-10-13 PROCEDURE — 36415 COLL VENOUS BLD VENIPUNCTURE: CPT

## 2024-10-13 PROCEDURE — 2580000003 HC RX 258: Performed by: STUDENT IN AN ORGANIZED HEALTH CARE EDUCATION/TRAINING PROGRAM

## 2024-10-13 PROCEDURE — 83735 ASSAY OF MAGNESIUM: CPT

## 2024-10-13 PROCEDURE — 82947 ASSAY GLUCOSE BLOOD QUANT: CPT

## 2024-10-13 PROCEDURE — 2060000000 HC ICU INTERMEDIATE R&B

## 2024-10-13 RX ADMIN — OXYCODONE 5 MG: 5 TABLET ORAL at 00:29

## 2024-10-13 RX ADMIN — ENOXAPARIN SODIUM 40 MG: 100 INJECTION SUBCUTANEOUS at 08:41

## 2024-10-13 RX ADMIN — OXYCODONE 5 MG: 5 TABLET ORAL at 08:41

## 2024-10-13 RX ADMIN — METHOCARBAMOL 750 MG: 750 TABLET ORAL at 22:23

## 2024-10-13 RX ADMIN — SODIUM CHLORIDE: 9 INJECTION, SOLUTION INTRAVENOUS at 02:28

## 2024-10-13 RX ADMIN — METHOCARBAMOL 750 MG: 750 TABLET ORAL at 18:10

## 2024-10-13 RX ADMIN — METHOCARBAMOL 750 MG: 750 TABLET ORAL at 06:04

## 2024-10-13 RX ADMIN — SODIUM CHLORIDE, PRESERVATIVE FREE 40 MG: 5 INJECTION INTRAVENOUS at 22:23

## 2024-10-13 RX ADMIN — PIPERACILLIN SODIUM AND TAZOBACTAM SODIUM 3375 MG: 3; .375 INJECTION, SOLUTION INTRAVENOUS at 02:30

## 2024-10-13 RX ADMIN — ACETAMINOPHEN 1000 MG: 500 TABLET ORAL at 13:16

## 2024-10-13 RX ADMIN — METOCLOPRAMIDE 10 MG: 5 INJECTION, SOLUTION INTRAMUSCULAR; INTRAVENOUS at 08:41

## 2024-10-13 RX ADMIN — ACETAMINOPHEN 1000 MG: 500 TABLET ORAL at 06:04

## 2024-10-13 RX ADMIN — OXYCODONE 5 MG: 5 TABLET ORAL at 04:14

## 2024-10-13 RX ADMIN — METHOCARBAMOL 750 MG: 750 TABLET ORAL at 00:29

## 2024-10-13 RX ADMIN — METOCLOPRAMIDE 10 MG: 5 INJECTION, SOLUTION INTRAMUSCULAR; INTRAVENOUS at 02:21

## 2024-10-13 RX ADMIN — ACETAMINOPHEN 1000 MG: 500 TABLET ORAL at 20:03

## 2024-10-13 RX ADMIN — OXYCODONE 5 MG: 5 TABLET ORAL at 13:16

## 2024-10-13 RX ADMIN — OXYCODONE 5 MG: 5 TABLET ORAL at 18:10

## 2024-10-13 RX ADMIN — METHOCARBAMOL 750 MG: 750 TABLET ORAL at 10:56

## 2024-10-13 RX ADMIN — PIPERACILLIN SODIUM AND TAZOBACTAM SODIUM 3375 MG: 3; .375 INJECTION, SOLUTION INTRAVENOUS at 18:13

## 2024-10-13 RX ADMIN — SODIUM CHLORIDE, PRESERVATIVE FREE 40 MG: 5 INJECTION INTRAVENOUS at 10:56

## 2024-10-13 RX ADMIN — SODIUM CHLORIDE, PRESERVATIVE FREE 40 MG: 5 INJECTION INTRAVENOUS at 00:29

## 2024-10-13 RX ADMIN — OXYCODONE 5 MG: 5 TABLET ORAL at 22:23

## 2024-10-13 RX ADMIN — PIPERACILLIN SODIUM AND TAZOBACTAM SODIUM 3375 MG: 3; .375 INJECTION, SOLUTION INTRAVENOUS at 11:01

## 2024-10-13 ASSESSMENT — PAIN SCALES - GENERAL
PAINLEVEL_OUTOF10: 6
PAINLEVEL_OUTOF10: 10
PAINLEVEL_OUTOF10: 9
PAINLEVEL_OUTOF10: 10
PAINLEVEL_OUTOF10: 10
PAINLEVEL_OUTOF10: 9
PAINLEVEL_OUTOF10: 9
PAINLEVEL_OUTOF10: 7
PAINLEVEL_OUTOF10: 5
PAINLEVEL_OUTOF10: 9
PAINLEVEL_OUTOF10: 9

## 2024-10-13 ASSESSMENT — PAIN DESCRIPTION - LOCATION
LOCATION: ABDOMEN

## 2024-10-13 ASSESSMENT — PAIN DESCRIPTION - DESCRIPTORS: DESCRIPTORS: ACHING

## 2024-10-13 ASSESSMENT — PAIN DESCRIPTION - ORIENTATION: ORIENTATION: RIGHT;LEFT;LOWER

## 2024-10-13 ASSESSMENT — PAIN SCALES - WONG BAKER: WONGBAKER_NUMERICALRESPONSE: HURTS A LITTLE BIT

## 2024-10-14 ENCOUNTER — APPOINTMENT (OUTPATIENT)
Dept: CT IMAGING | Age: 49
DRG: 330 | End: 2024-10-14
Payer: COMMERCIAL

## 2024-10-14 LAB
ALBUMIN SERPL-MCNC: 3.2 G/DL (ref 3.5–5.2)
ALBUMIN/GLOB SERPL: 1 {RATIO} (ref 1–2.5)
ALP SERPL-CCNC: 159 U/L (ref 35–104)
ALT SERPL-CCNC: 29 U/L (ref 10–35)
ANION GAP SERPL CALCULATED.3IONS-SCNC: 11 MMOL/L (ref 9–16)
AST SERPL-CCNC: 34 U/L (ref 10–35)
BASOPHILS # BLD: 0 K/UL (ref 0–0.2)
BASOPHILS NFR BLD: 0 % (ref 0–2)
BILIRUB DIRECT SERPL-MCNC: 0.2 MG/DL (ref 0–0.2)
BILIRUB INDIRECT SERPL-MCNC: 0.2 MG/DL (ref 0–1)
BILIRUB SERPL-MCNC: 0.4 MG/DL (ref 0–1.2)
BUN SERPL-MCNC: 5 MG/DL (ref 6–20)
CALCIUM SERPL-MCNC: 8.8 MG/DL (ref 8.6–10.4)
CHLORIDE SERPL-SCNC: 102 MMOL/L (ref 98–107)
CO2 SERPL-SCNC: 22 MMOL/L (ref 20–31)
CREAT SERPL-MCNC: 0.7 MG/DL (ref 0.5–0.9)
EOSINOPHIL # BLD: 0.58 K/UL (ref 0–0.44)
EOSINOPHILS RELATIVE PERCENT: 4 % (ref 1–4)
ERYTHROCYTE [DISTWIDTH] IN BLOOD BY AUTOMATED COUNT: 21.2 % (ref 11.8–14.4)
GFR, ESTIMATED: >90 ML/MIN/1.73M2
GLOBULIN SER CALC-MCNC: 3.4 G/DL
GLUCOSE BLD-MCNC: 89 MG/DL (ref 65–105)
GLUCOSE SERPL-MCNC: 85 MG/DL (ref 74–99)
HCT VFR BLD AUTO: 30.6 % (ref 36.3–47.1)
HGB BLD-MCNC: 9.2 G/DL (ref 11.9–15.1)
IMM GRANULOCYTES # BLD AUTO: 0.15 K/UL (ref 0–0.3)
IMM GRANULOCYTES NFR BLD: 1 %
LYMPHOCYTES NFR BLD: 1.31 K/UL (ref 1.1–3.7)
LYMPHOCYTES RELATIVE PERCENT: 9 % (ref 24–43)
MAGNESIUM SERPL-MCNC: 2 MG/DL (ref 1.6–2.6)
MCH RBC QN AUTO: 24.8 PG (ref 25.2–33.5)
MCHC RBC AUTO-ENTMCNC: 30.1 G/DL (ref 28.4–34.8)
MCV RBC AUTO: 82.5 FL (ref 82.6–102.9)
MONOCYTES NFR BLD: 0.73 K/UL (ref 0.1–1.2)
MONOCYTES NFR BLD: 5 % (ref 3–12)
MORPHOLOGY: ABNORMAL
MORPHOLOGY: ABNORMAL
NEUTROPHILS NFR BLD: 81 % (ref 36–65)
NEUTS SEG NFR BLD: 11.83 K/UL (ref 1.5–8.1)
NRBC BLD-RTO: 0 PER 100 WBC
PHOSPHATE SERPL-MCNC: 3.5 MG/DL (ref 2.5–4.5)
PLATELET # BLD AUTO: 796 K/UL (ref 138–453)
PMV BLD AUTO: 9.2 FL (ref 8.1–13.5)
POTASSIUM SERPL-SCNC: 4.2 MMOL/L (ref 3.7–5.3)
PROT SERPL-MCNC: 6.6 G/DL (ref 6.6–8.7)
RBC # BLD AUTO: 3.71 M/UL (ref 3.95–5.11)
SODIUM SERPL-SCNC: 135 MMOL/L (ref 136–145)
WBC OTHER # BLD: 14.6 K/UL (ref 3.5–11.3)

## 2024-10-14 PROCEDURE — 85025 COMPLETE CBC W/AUTO DIFF WBC: CPT

## 2024-10-14 PROCEDURE — 6370000000 HC RX 637 (ALT 250 FOR IP)

## 2024-10-14 PROCEDURE — 6360000002 HC RX W HCPCS: Performed by: STUDENT IN AN ORGANIZED HEALTH CARE EDUCATION/TRAINING PROGRAM

## 2024-10-14 PROCEDURE — 84100 ASSAY OF PHOSPHORUS: CPT

## 2024-10-14 PROCEDURE — 6360000004 HC RX CONTRAST MEDICATION

## 2024-10-14 PROCEDURE — 6370000000 HC RX 637 (ALT 250 FOR IP): Performed by: STUDENT IN AN ORGANIZED HEALTH CARE EDUCATION/TRAINING PROGRAM

## 2024-10-14 PROCEDURE — 80048 BASIC METABOLIC PNL TOTAL CA: CPT

## 2024-10-14 PROCEDURE — 36415 COLL VENOUS BLD VENIPUNCTURE: CPT

## 2024-10-14 PROCEDURE — 71250 CT THORAX DX C-: CPT

## 2024-10-14 PROCEDURE — 6360000002 HC RX W HCPCS

## 2024-10-14 PROCEDURE — 99024 POSTOP FOLLOW-UP VISIT: CPT | Performed by: PHYSICIAN ASSISTANT

## 2024-10-14 PROCEDURE — 97605 NEG PRS WND THER DME<=50SQCM: CPT

## 2024-10-14 PROCEDURE — 2060000000 HC ICU INTERMEDIATE R&B

## 2024-10-14 PROCEDURE — 82947 ASSAY GLUCOSE BLOOD QUANT: CPT

## 2024-10-14 PROCEDURE — 80076 HEPATIC FUNCTION PANEL: CPT

## 2024-10-14 PROCEDURE — 99024 POSTOP FOLLOW-UP VISIT: CPT | Performed by: SURGERY

## 2024-10-14 PROCEDURE — 2580000003 HC RX 258: Performed by: STUDENT IN AN ORGANIZED HEALTH CARE EDUCATION/TRAINING PROGRAM

## 2024-10-14 PROCEDURE — 74177 CT ABD & PELVIS W/CONTRAST: CPT

## 2024-10-14 PROCEDURE — 83735 ASSAY OF MAGNESIUM: CPT

## 2024-10-14 RX ORDER — IOPAMIDOL 755 MG/ML
75 INJECTION, SOLUTION INTRAVASCULAR
Status: COMPLETED | OUTPATIENT
Start: 2024-10-14 | End: 2024-10-14

## 2024-10-14 RX ORDER — OXYCODONE HYDROCHLORIDE 5 MG/1
5 TABLET ORAL EVERY 6 HOURS PRN
Status: DISCONTINUED | OUTPATIENT
Start: 2024-10-14 | End: 2024-10-16 | Stop reason: HOSPADM

## 2024-10-14 RX ORDER — KETOROLAC TROMETHAMINE 15 MG/ML
15 INJECTION, SOLUTION INTRAMUSCULAR; INTRAVENOUS EVERY 6 HOURS
Status: DISCONTINUED | OUTPATIENT
Start: 2024-10-14 | End: 2024-10-16 | Stop reason: HOSPADM

## 2024-10-14 RX ORDER — FENTANYL CITRATE 50 UG/ML
50 INJECTION, SOLUTION INTRAMUSCULAR; INTRAVENOUS
Status: COMPLETED | OUTPATIENT
Start: 2024-10-14 | End: 2024-10-14

## 2024-10-14 RX ORDER — OXYCODONE HYDROCHLORIDE 5 MG/1
5 TABLET ORAL ONCE
Status: COMPLETED | OUTPATIENT
Start: 2024-10-14 | End: 2024-10-14

## 2024-10-14 RX ADMIN — IOPAMIDOL 75 ML: 755 INJECTION, SOLUTION INTRAVENOUS at 11:30

## 2024-10-14 RX ADMIN — OXYCODONE 5 MG: 5 TABLET ORAL at 06:26

## 2024-10-14 RX ADMIN — METHOCARBAMOL 750 MG: 750 TABLET ORAL at 05:23

## 2024-10-14 RX ADMIN — KETOROLAC TROMETHAMINE 15 MG: 15 INJECTION, SOLUTION INTRAMUSCULAR; INTRAVENOUS at 22:49

## 2024-10-14 RX ADMIN — SODIUM CHLORIDE, PRESERVATIVE FREE 40 MG: 5 INJECTION INTRAVENOUS at 23:02

## 2024-10-14 RX ADMIN — KETOROLAC TROMETHAMINE 15 MG: 15 INJECTION, SOLUTION INTRAMUSCULAR; INTRAVENOUS at 12:13

## 2024-10-14 RX ADMIN — METHOCARBAMOL 750 MG: 750 TABLET ORAL at 23:46

## 2024-10-14 RX ADMIN — PIPERACILLIN SODIUM AND TAZOBACTAM SODIUM 3375 MG: 3; .375 INJECTION, SOLUTION INTRAVENOUS at 10:12

## 2024-10-14 RX ADMIN — OXYCODONE 5 MG: 5 TABLET ORAL at 14:32

## 2024-10-14 RX ADMIN — ACETAMINOPHEN 1000 MG: 500 TABLET ORAL at 22:49

## 2024-10-14 RX ADMIN — PIPERACILLIN SODIUM AND TAZOBACTAM SODIUM 3375 MG: 3; .375 INJECTION, SOLUTION INTRAVENOUS at 01:39

## 2024-10-14 RX ADMIN — OXYCODONE 5 MG: 5 TABLET ORAL at 10:33

## 2024-10-14 RX ADMIN — METHOCARBAMOL 750 MG: 750 TABLET ORAL at 17:37

## 2024-10-14 RX ADMIN — ACETAMINOPHEN 1000 MG: 500 TABLET ORAL at 05:23

## 2024-10-14 RX ADMIN — OXYCODONE HYDROCHLORIDE 5 MG: 5 TABLET ORAL at 02:23

## 2024-10-14 RX ADMIN — SODIUM CHLORIDE, PRESERVATIVE FREE 40 MG: 5 INJECTION INTRAVENOUS at 10:11

## 2024-10-14 RX ADMIN — ONDANSETRON 4 MG: 2 INJECTION INTRAMUSCULAR; INTRAVENOUS at 15:28

## 2024-10-14 RX ADMIN — METHOCARBAMOL 750 MG: 750 TABLET ORAL at 10:14

## 2024-10-14 RX ADMIN — ACETAMINOPHEN 1000 MG: 500 TABLET ORAL at 13:54

## 2024-10-14 RX ADMIN — PIPERACILLIN SODIUM AND TAZOBACTAM SODIUM 3375 MG: 3; .375 INJECTION, SOLUTION INTRAVENOUS at 17:39

## 2024-10-14 RX ADMIN — KETOROLAC TROMETHAMINE 15 MG: 15 INJECTION, SOLUTION INTRAMUSCULAR; INTRAVENOUS at 15:32

## 2024-10-14 RX ADMIN — FENTANYL CITRATE 50 MCG: 50 INJECTION, SOLUTION INTRAMUSCULAR; INTRAVENOUS at 13:54

## 2024-10-14 RX ADMIN — ENOXAPARIN SODIUM 40 MG: 100 INJECTION SUBCUTANEOUS at 08:50

## 2024-10-14 RX ADMIN — OXYCODONE HYDROCHLORIDE 5 MG: 5 TABLET ORAL at 20:31

## 2024-10-14 ASSESSMENT — PAIN DESCRIPTION - DESCRIPTORS
DESCRIPTORS: SORE;STABBING
DESCRIPTORS: ACHING
DESCRIPTORS: ACHING
DESCRIPTORS: CRAMPING
DESCRIPTORS: ACHING
DESCRIPTORS: STABBING

## 2024-10-14 ASSESSMENT — PAIN SCALES - GENERAL
PAINLEVEL_OUTOF10: 10
PAINLEVEL_OUTOF10: 9
PAINLEVEL_OUTOF10: 9
PAINLEVEL_OUTOF10: 10
PAINLEVEL_OUTOF10: 9
PAINLEVEL_OUTOF10: 9
PAINLEVEL_OUTOF10: 6
PAINLEVEL_OUTOF10: 9
PAINLEVEL_OUTOF10: 7
PAINLEVEL_OUTOF10: 10
PAINLEVEL_OUTOF10: 10
PAINLEVEL_OUTOF10: 9
PAINLEVEL_OUTOF10: 8

## 2024-10-14 ASSESSMENT — PAIN - FUNCTIONAL ASSESSMENT
PAIN_FUNCTIONAL_ASSESSMENT: PREVENTS OR INTERFERES SOME ACTIVE ACTIVITIES AND ADLS
PAIN_FUNCTIONAL_ASSESSMENT: ACTIVITIES ARE NOT PREVENTED
PAIN_FUNCTIONAL_ASSESSMENT: PREVENTS OR INTERFERES SOME ACTIVE ACTIVITIES AND ADLS

## 2024-10-14 ASSESSMENT — PAIN DESCRIPTION - LOCATION
LOCATION: ABDOMEN
LOCATION: ABDOMEN;OTHER (COMMENT)
LOCATION: ABDOMEN

## 2024-10-14 ASSESSMENT — PAIN DESCRIPTION - ORIENTATION
ORIENTATION: MID
ORIENTATION: MID
ORIENTATION: RIGHT;MID
ORIENTATION: MID;LOWER
ORIENTATION: MID

## 2024-10-14 NOTE — CARE COORDINATION
Transitional planning: Met w/ patient and daughter about discharge plan and plans to return home w/ [facility]. Referral was made to [address] last week w/ no call back yet. Agrees to add [facility] since they also take patients without a PCP. Spoke at length w/ ostomy/wound care nurse and states that patient needs a regular wound vac. Call to [address] and left  for [facility] to call CM back about referral. Referral sent to [facility].    4:40 PM Call again to [facility] and left another . Call to [address] office and is closed. Message left w/ answering service to call CM back.    4:55 PM Call back from [address] on call and states never received the referral. Noted that referral was sent to [address] in [place]. Will send referral and they will review in the AM and return call to .    5:10 PM Application papers for wound vac placed in patient's blue soft chart. P/S Dr Alessia Kirby to fill out papers as soon as possible, so can submit for approval.     5:15 PM Referral sent to [address]. Call from Melvina at [facility] that cannot accept patient d/t [employer] plan that they cannot take.

## 2024-10-15 ENCOUNTER — APPOINTMENT (OUTPATIENT)
Dept: INTERVENTIONAL RADIOLOGY/VASCULAR | Age: 49
DRG: 330 | End: 2024-10-15
Payer: COMMERCIAL

## 2024-10-15 LAB
ANION GAP SERPL CALCULATED.3IONS-SCNC: 9 MMOL/L (ref 9–16)
BASOPHILS # BLD: 0.1 K/UL (ref 0–0.2)
BASOPHILS NFR BLD: 1 % (ref 0–2)
BUN SERPL-MCNC: 10 MG/DL (ref 6–20)
CALCIUM SERPL-MCNC: 8.5 MG/DL (ref 8.6–10.4)
CHLORIDE SERPL-SCNC: 103 MMOL/L (ref 98–107)
CO2 SERPL-SCNC: 20 MMOL/L (ref 20–31)
CREAT SERPL-MCNC: 0.8 MG/DL (ref 0.5–0.9)
EOSINOPHIL # BLD: 0.42 K/UL (ref 0–0.44)
EOSINOPHILS RELATIVE PERCENT: 4 % (ref 1–4)
ERYTHROCYTE [DISTWIDTH] IN BLOOD BY AUTOMATED COUNT: 21.4 % (ref 11.8–14.4)
GFR, ESTIMATED: >90 ML/MIN/1.73M2
GLUCOSE SERPL-MCNC: 82 MG/DL (ref 74–99)
HCT VFR BLD AUTO: 29.2 % (ref 36.3–47.1)
HGB BLD-MCNC: 8.7 G/DL (ref 11.9–15.1)
IMM GRANULOCYTES # BLD AUTO: 0.1 K/UL (ref 0–0.3)
IMM GRANULOCYTES NFR BLD: 1 %
INR PPP: 1
LYMPHOCYTES NFR BLD: 0.94 K/UL (ref 1.1–3.7)
LYMPHOCYTES RELATIVE PERCENT: 9 % (ref 24–43)
MAGNESIUM SERPL-MCNC: 2.2 MG/DL (ref 1.6–2.6)
MCH RBC QN AUTO: 25.2 PG (ref 25.2–33.5)
MCHC RBC AUTO-ENTMCNC: 29.8 G/DL (ref 28.4–34.8)
MCV RBC AUTO: 84.6 FL (ref 82.6–102.9)
MONOCYTES NFR BLD: 0.62 K/UL (ref 0.1–1.2)
MONOCYTES NFR BLD: 6 % (ref 3–12)
MORPHOLOGY: ABNORMAL
NEUTROPHILS NFR BLD: 79 % (ref 36–65)
NEUTS SEG NFR BLD: 8.22 K/UL (ref 1.5–8.1)
NRBC BLD-RTO: 0 PER 100 WBC
PHOSPHATE SERPL-MCNC: 3.6 MG/DL (ref 2.5–4.5)
PLATELET # BLD AUTO: 713 K/UL (ref 138–453)
PMV BLD AUTO: 9.2 FL (ref 8.1–13.5)
POTASSIUM SERPL-SCNC: 4.3 MMOL/L (ref 3.7–5.3)
PROCALCITONIN SERPL-MCNC: 0.42 NG/ML (ref 0–0.09)
PROTHROMBIN TIME: 13.3 SEC (ref 11.7–14.9)
RBC # BLD AUTO: 3.45 M/UL (ref 3.95–5.11)
RBC # BLD: ABNORMAL 10*6/UL
SODIUM SERPL-SCNC: 132 MMOL/L (ref 136–145)
WBC OTHER # BLD: 10.4 K/UL (ref 3.5–11.3)

## 2024-10-15 PROCEDURE — 76942 ECHO GUIDE FOR BIOPSY: CPT

## 2024-10-15 PROCEDURE — 6370000000 HC RX 637 (ALT 250 FOR IP): Performed by: NURSE PRACTITIONER

## 2024-10-15 PROCEDURE — 87205 SMEAR GRAM STAIN: CPT

## 2024-10-15 PROCEDURE — 2060000000 HC ICU INTERMEDIATE R&B

## 2024-10-15 PROCEDURE — 6370000000 HC RX 637 (ALT 250 FOR IP): Performed by: STUDENT IN AN ORGANIZED HEALTH CARE EDUCATION/TRAINING PROGRAM

## 2024-10-15 PROCEDURE — 0W9B3ZZ DRAINAGE OF LEFT PLEURAL CAVITY, PERCUTANEOUS APPROACH: ICD-10-PCS | Performed by: PHYSICIAN ASSISTANT

## 2024-10-15 PROCEDURE — 6360000002 HC RX W HCPCS: Performed by: STUDENT IN AN ORGANIZED HEALTH CARE EDUCATION/TRAINING PROGRAM

## 2024-10-15 PROCEDURE — 87070 CULTURE OTHR SPECIMN AEROBIC: CPT

## 2024-10-15 PROCEDURE — 6360000002 HC RX W HCPCS

## 2024-10-15 PROCEDURE — 99024 POSTOP FOLLOW-UP VISIT: CPT | Performed by: PHYSICIAN ASSISTANT

## 2024-10-15 PROCEDURE — 80048 BASIC METABOLIC PNL TOTAL CA: CPT

## 2024-10-15 PROCEDURE — 6360000002 HC RX W HCPCS: Performed by: RADIOLOGY

## 2024-10-15 PROCEDURE — 84145 PROCALCITONIN (PCT): CPT

## 2024-10-15 PROCEDURE — 36415 COLL VENOUS BLD VENIPUNCTURE: CPT

## 2024-10-15 PROCEDURE — 10160 PNXR ASPIR ABSC HMTMA BULLA: CPT

## 2024-10-15 PROCEDURE — 32555 ASPIRATE PLEURA W/ IMAGING: CPT

## 2024-10-15 PROCEDURE — 87075 CULTR BACTERIA EXCEPT BLOOD: CPT

## 2024-10-15 PROCEDURE — 85025 COMPLETE CBC W/AUTO DIFF WBC: CPT

## 2024-10-15 PROCEDURE — C1729 CATH, DRAINAGE: HCPCS

## 2024-10-15 PROCEDURE — 84100 ASSAY OF PHOSPHORUS: CPT

## 2024-10-15 PROCEDURE — 85610 PROTHROMBIN TIME: CPT

## 2024-10-15 PROCEDURE — 2580000003 HC RX 258: Performed by: STUDENT IN AN ORGANIZED HEALTH CARE EDUCATION/TRAINING PROGRAM

## 2024-10-15 PROCEDURE — 83735 ASSAY OF MAGNESIUM: CPT

## 2024-10-15 RX ORDER — FENTANYL CITRATE 50 UG/ML
INJECTION, SOLUTION INTRAMUSCULAR; INTRAVENOUS PRN
Status: COMPLETED | OUTPATIENT
Start: 2024-10-15 | End: 2024-10-15

## 2024-10-15 RX ORDER — SENNOSIDES A AND B 8.6 MG/1
1 TABLET, FILM COATED ORAL NIGHTLY
Status: DISCONTINUED | OUTPATIENT
Start: 2024-10-15 | End: 2024-10-16 | Stop reason: HOSPADM

## 2024-10-15 RX ORDER — POLYETHYLENE GLYCOL 3350 17 G/17G
17 POWDER, FOR SOLUTION ORAL DAILY
Status: DISCONTINUED | OUTPATIENT
Start: 2024-10-15 | End: 2024-10-16 | Stop reason: HOSPADM

## 2024-10-15 RX ADMIN — PIPERACILLIN SODIUM AND TAZOBACTAM SODIUM 3375 MG: 3; .375 INJECTION, SOLUTION INTRAVENOUS at 17:35

## 2024-10-15 RX ADMIN — KETOROLAC TROMETHAMINE 15 MG: 15 INJECTION, SOLUTION INTRAMUSCULAR; INTRAVENOUS at 10:00

## 2024-10-15 RX ADMIN — PIPERACILLIN SODIUM AND TAZOBACTAM SODIUM 3375 MG: 3; .375 INJECTION, SOLUTION INTRAVENOUS at 10:03

## 2024-10-15 RX ADMIN — METHOCARBAMOL 750 MG: 750 TABLET ORAL at 22:40

## 2024-10-15 RX ADMIN — KETOROLAC TROMETHAMINE 15 MG: 15 INJECTION, SOLUTION INTRAMUSCULAR; INTRAVENOUS at 16:09

## 2024-10-15 RX ADMIN — OXYCODONE HYDROCHLORIDE 5 MG: 5 TABLET ORAL at 21:07

## 2024-10-15 RX ADMIN — KETOROLAC TROMETHAMINE 15 MG: 15 INJECTION, SOLUTION INTRAMUSCULAR; INTRAVENOUS at 04:30

## 2024-10-15 RX ADMIN — METHOCARBAMOL 750 MG: 750 TABLET ORAL at 17:30

## 2024-10-15 RX ADMIN — OXYCODONE HYDROCHLORIDE 5 MG: 5 TABLET ORAL at 01:59

## 2024-10-15 RX ADMIN — POLYETHYLENE GLYCOL 3350 17 G: 17 POWDER, FOR SOLUTION ORAL at 14:29

## 2024-10-15 RX ADMIN — FENTANYL CITRATE 50 MCG: 50 INJECTION, SOLUTION INTRAMUSCULAR; INTRAVENOUS at 08:45

## 2024-10-15 RX ADMIN — ACETAMINOPHEN 1000 MG: 500 TABLET ORAL at 14:13

## 2024-10-15 RX ADMIN — PIPERACILLIN SODIUM AND TAZOBACTAM SODIUM 3375 MG: 3; .375 INJECTION, SOLUTION INTRAVENOUS at 01:56

## 2024-10-15 RX ADMIN — OXYCODONE HYDROCHLORIDE 5 MG: 5 TABLET ORAL at 10:13

## 2024-10-15 RX ADMIN — METHOCARBAMOL 750 MG: 750 TABLET ORAL at 06:42

## 2024-10-15 RX ADMIN — KETOROLAC TROMETHAMINE 15 MG: 15 INJECTION, SOLUTION INTRAMUSCULAR; INTRAVENOUS at 22:40

## 2024-10-15 RX ADMIN — SENNOSIDES 8.6 MG: 8.6 TABLET, FILM COATED ORAL at 21:07

## 2024-10-15 RX ADMIN — ACETAMINOPHEN 1000 MG: 500 TABLET ORAL at 21:07

## 2024-10-15 RX ADMIN — ACETAMINOPHEN 1000 MG: 500 TABLET ORAL at 06:42

## 2024-10-15 RX ADMIN — SODIUM CHLORIDE, PRESERVATIVE FREE 40 MG: 5 INJECTION INTRAVENOUS at 10:00

## 2024-10-15 RX ADMIN — METHOCARBAMOL 750 MG: 750 TABLET ORAL at 10:13

## 2024-10-15 RX ADMIN — SODIUM CHLORIDE, PRESERVATIVE FREE 40 MG: 5 INJECTION INTRAVENOUS at 22:40

## 2024-10-15 ASSESSMENT — PAIN DESCRIPTION - LOCATION
LOCATION: ABDOMEN
LOCATION: ABDOMEN;OTHER (COMMENT)
LOCATION: ABDOMEN

## 2024-10-15 ASSESSMENT — PAIN SCALES - GENERAL
PAINLEVEL_OUTOF10: 8
PAINLEVEL_OUTOF10: 8
PAINLEVEL_OUTOF10: 7
PAINLEVEL_OUTOF10: 8
PAINLEVEL_OUTOF10: 7
PAINLEVEL_OUTOF10: 8
PAINLEVEL_OUTOF10: 8
PAINLEVEL_OUTOF10: 9
PAINLEVEL_OUTOF10: 6
PAINLEVEL_OUTOF10: 9

## 2024-10-15 ASSESSMENT — PAIN DESCRIPTION - DESCRIPTORS
DESCRIPTORS: SORE
DESCRIPTORS: ACHING
DESCRIPTORS: SORE
DESCRIPTORS: SHARP;PRESSURE
DESCRIPTORS: ACHING

## 2024-10-15 ASSESSMENT — PAIN DESCRIPTION - ORIENTATION
ORIENTATION: MID
ORIENTATION: LOWER;MID
ORIENTATION: MID
ORIENTATION: MID;LOWER
ORIENTATION: MID
ORIENTATION: LOWER

## 2024-10-15 ASSESSMENT — PAIN - FUNCTIONAL ASSESSMENT
PAIN_FUNCTIONAL_ASSESSMENT: ACTIVITIES ARE NOT PREVENTED
PAIN_FUNCTIONAL_ASSESSMENT: PREVENTS OR INTERFERES SOME ACTIVE ACTIVITIES AND ADLS
PAIN_FUNCTIONAL_ASSESSMENT: PREVENTS OR INTERFERES SOME ACTIVE ACTIVITIES AND ADLS
PAIN_FUNCTIONAL_ASSESSMENT: ACTIVITIES ARE NOT PREVENTED

## 2024-10-15 NOTE — OR NURSING
Left thoracentesis per JR MAGALLON  Clear yellow fluid  250 mls   No orders for sample at this time

## 2024-10-15 NOTE — BRIEF OP NOTE
Brief Postoperative Note    Eunice Ortega  YOB: 1975  [id number]    Pre-operative Diagnosis: Intraabdominal fluid collection; Pleural effusion    Post-operative Diagnosis: Same    Procedure: Intraabdominal fluid aspiration; Thoracentesis    Anesthesia: Local    Surgeons/Assistants: MD Christiano and SHERITA Fox    Estimated Blood Loss: Minimal    Complications: None    Specimens: Was Obtained:     Findings: Successful aspiration of intraabdominal fluid collection.  160 mL of non turbid serosang.  It was decided drain placement was not warranted.     Successful left thoracentesis.  250 mL of clear yellow fluid aspirated.     Electronically signed by SHERITA Fox on 10/15/2024 at 10:27 AM

## 2024-10-16 VITALS
WEIGHT: 145.5 LBS | DIASTOLIC BLOOD PRESSURE: 74 MMHG | RESPIRATION RATE: 13 BRPM | BODY MASS INDEX: 25.78 KG/M2 | HEART RATE: 59 BPM | SYSTOLIC BLOOD PRESSURE: 103 MMHG | TEMPERATURE: 98.4 F | OXYGEN SATURATION: 98 % | HEIGHT: 63 IN

## 2024-10-16 LAB
ANION GAP SERPL CALCULATED.3IONS-SCNC: 12 MMOL/L (ref 9–16)
BASOPHILS # BLD: 0.1 K/UL (ref 0–0.2)
BASOPHILS NFR BLD: 1 % (ref 0–2)
BUN SERPL-MCNC: 7 MG/DL (ref 6–20)
CALCIUM SERPL-MCNC: 8.4 MG/DL (ref 8.6–10.4)
CHLORIDE SERPL-SCNC: 104 MMOL/L (ref 98–107)
CO2 SERPL-SCNC: 21 MMOL/L (ref 20–31)
CREAT SERPL-MCNC: 0.8 MG/DL (ref 0.5–0.9)
EOSINOPHIL # BLD: 0.5 K/UL (ref 0–0.44)
EOSINOPHILS RELATIVE PERCENT: 5 % (ref 1–4)
ERYTHROCYTE [DISTWIDTH] IN BLOOD BY AUTOMATED COUNT: 21.7 % (ref 11.8–14.4)
GFR, ESTIMATED: >90 ML/MIN/1.73M2
GLUCOSE SERPL-MCNC: 101 MG/DL (ref 74–99)
HCT VFR BLD AUTO: 29.7 % (ref 36.3–47.1)
HGB BLD-MCNC: 8.6 G/DL (ref 11.9–15.1)
IMM GRANULOCYTES # BLD AUTO: 0.1 K/UL (ref 0–0.3)
IMM GRANULOCYTES NFR BLD: 1 %
LYMPHOCYTES NFR BLD: 0.89 K/UL (ref 1.1–3.7)
LYMPHOCYTES RELATIVE PERCENT: 9 % (ref 24–43)
MAGNESIUM SERPL-MCNC: 2.1 MG/DL (ref 1.6–2.6)
MCH RBC QN AUTO: 25.1 PG (ref 25.2–33.5)
MCHC RBC AUTO-ENTMCNC: 29 G/DL (ref 28.4–34.8)
MCV RBC AUTO: 86.8 FL (ref 82.6–102.9)
MONOCYTES NFR BLD: 0.79 K/UL (ref 0.1–1.2)
MONOCYTES NFR BLD: 8 % (ref 3–12)
MORPHOLOGY: ABNORMAL
NEUTROPHILS NFR BLD: 76 % (ref 36–65)
NEUTS SEG NFR BLD: 7.52 K/UL (ref 1.5–8.1)
NRBC BLD-RTO: 0 PER 100 WBC
PHOSPHATE SERPL-MCNC: 3 MG/DL (ref 2.5–4.5)
PLATELET # BLD AUTO: 711 K/UL (ref 138–453)
PMV BLD AUTO: 9.2 FL (ref 8.1–13.5)
POTASSIUM SERPL-SCNC: 3.7 MMOL/L (ref 3.7–5.3)
PROCALCITONIN SERPL-MCNC: 0.36 NG/ML (ref 0–0.09)
PROCALCITONIN SERPL-MCNC: 0.41 NG/ML (ref 0–0.09)
RBC # BLD AUTO: 3.42 M/UL (ref 3.95–5.11)
SODIUM SERPL-SCNC: 137 MMOL/L (ref 136–145)
WBC OTHER # BLD: 9.9 K/UL (ref 3.5–11.3)

## 2024-10-16 PROCEDURE — 97605 NEG PRS WND THER DME<=50SQCM: CPT

## 2024-10-16 PROCEDURE — 84145 PROCALCITONIN (PCT): CPT

## 2024-10-16 PROCEDURE — 6370000000 HC RX 637 (ALT 250 FOR IP): Performed by: STUDENT IN AN ORGANIZED HEALTH CARE EDUCATION/TRAINING PROGRAM

## 2024-10-16 PROCEDURE — 2580000003 HC RX 258: Performed by: STUDENT IN AN ORGANIZED HEALTH CARE EDUCATION/TRAINING PROGRAM

## 2024-10-16 PROCEDURE — 6360000002 HC RX W HCPCS

## 2024-10-16 PROCEDURE — 99024 POSTOP FOLLOW-UP VISIT: CPT | Performed by: PHYSICIAN ASSISTANT

## 2024-10-16 PROCEDURE — 6360000002 HC RX W HCPCS: Performed by: STUDENT IN AN ORGANIZED HEALTH CARE EDUCATION/TRAINING PROGRAM

## 2024-10-16 PROCEDURE — 80048 BASIC METABOLIC PNL TOTAL CA: CPT

## 2024-10-16 PROCEDURE — 84100 ASSAY OF PHOSPHORUS: CPT

## 2024-10-16 PROCEDURE — 6370000000 HC RX 637 (ALT 250 FOR IP): Performed by: NURSE PRACTITIONER

## 2024-10-16 PROCEDURE — 36415 COLL VENOUS BLD VENIPUNCTURE: CPT

## 2024-10-16 PROCEDURE — 83735 ASSAY OF MAGNESIUM: CPT

## 2024-10-16 PROCEDURE — 85025 COMPLETE CBC W/AUTO DIFF WBC: CPT

## 2024-10-16 RX ORDER — OXYCODONE HYDROCHLORIDE 5 MG/1
5 TABLET ORAL EVERY 6 HOURS PRN
Qty: 22 TABLET | Refills: 0 | Status: SHIPPED | OUTPATIENT
Start: 2024-10-16 | End: 2024-10-16

## 2024-10-16 RX ORDER — PANTOPRAZOLE SODIUM 40 MG/1
40 TABLET, DELAYED RELEASE ORAL
Status: DISCONTINUED | OUTPATIENT
Start: 2024-10-16 | End: 2024-10-16 | Stop reason: HOSPADM

## 2024-10-16 RX ORDER — METHOCARBAMOL 750 MG/1
750 TABLET, FILM COATED ORAL EVERY 6 HOURS
Qty: 40 TABLET | Refills: 0 | Status: SHIPPED | OUTPATIENT
Start: 2024-10-16 | End: 2024-10-26

## 2024-10-16 RX ORDER — OXYCODONE HYDROCHLORIDE 5 MG/1
5 TABLET ORAL EVERY 6 HOURS PRN
Qty: 22 TABLET | Refills: 0 | Status: SHIPPED | OUTPATIENT
Start: 2024-10-16 | End: 2024-10-23

## 2024-10-16 RX ORDER — PANTOPRAZOLE SODIUM 40 MG/1
40 TABLET, DELAYED RELEASE ORAL
Qty: 30 TABLET | Refills: 3 | Status: SHIPPED | OUTPATIENT
Start: 2024-10-17

## 2024-10-16 RX ORDER — SENNOSIDES A AND B 8.6 MG/1
1 TABLET, FILM COATED ORAL NIGHTLY
Qty: 30 TABLET | Refills: 0 | Status: SHIPPED | OUTPATIENT
Start: 2024-10-16 | End: 2024-11-15

## 2024-10-16 RX ADMIN — POLYETHYLENE GLYCOL 3350 17 G: 17 POWDER, FOR SOLUTION ORAL at 08:13

## 2024-10-16 RX ADMIN — ACETAMINOPHEN 1000 MG: 500 TABLET ORAL at 05:14

## 2024-10-16 RX ADMIN — METHOCARBAMOL 750 MG: 750 TABLET ORAL at 16:58

## 2024-10-16 RX ADMIN — PIPERACILLIN SODIUM AND TAZOBACTAM SODIUM 3375 MG: 3; .375 INJECTION, SOLUTION INTRAVENOUS at 10:55

## 2024-10-16 RX ADMIN — SODIUM CHLORIDE, PRESERVATIVE FREE 10 ML: 5 INJECTION INTRAVENOUS at 08:13

## 2024-10-16 RX ADMIN — METHOCARBAMOL 750 MG: 750 TABLET ORAL at 05:14

## 2024-10-16 RX ADMIN — ACETAMINOPHEN 1000 MG: 500 TABLET ORAL at 14:42

## 2024-10-16 RX ADMIN — PIPERACILLIN SODIUM AND TAZOBACTAM SODIUM 3375 MG: 3; .375 INJECTION, SOLUTION INTRAVENOUS at 02:09

## 2024-10-16 RX ADMIN — KETOROLAC TROMETHAMINE 15 MG: 15 INJECTION, SOLUTION INTRAMUSCULAR; INTRAVENOUS at 16:58

## 2024-10-16 RX ADMIN — KETOROLAC TROMETHAMINE 15 MG: 15 INJECTION, SOLUTION INTRAMUSCULAR; INTRAVENOUS at 05:14

## 2024-10-16 RX ADMIN — METHOCARBAMOL 750 MG: 750 TABLET ORAL at 10:53

## 2024-10-16 RX ADMIN — OXYCODONE HYDROCHLORIDE 5 MG: 5 TABLET ORAL at 11:54

## 2024-10-16 RX ADMIN — KETOROLAC TROMETHAMINE 15 MG: 15 INJECTION, SOLUTION INTRAMUSCULAR; INTRAVENOUS at 10:53

## 2024-10-16 RX ADMIN — PANTOPRAZOLE SODIUM 40 MG: 40 TABLET, DELAYED RELEASE ORAL at 08:13

## 2024-10-16 ASSESSMENT — PAIN SCALES - GENERAL
PAINLEVEL_OUTOF10: 7
PAINLEVEL_OUTOF10: 8
PAINLEVEL_OUTOF10: 9

## 2024-10-16 ASSESSMENT — PAIN DESCRIPTION - DESCRIPTORS
DESCRIPTORS: ACHING
DESCRIPTORS: ACHING

## 2024-10-16 ASSESSMENT — PAIN DESCRIPTION - PAIN TYPE: TYPE: SURGICAL PAIN;ACUTE PAIN

## 2024-10-16 ASSESSMENT — PAIN DESCRIPTION - ORIENTATION
ORIENTATION: MID
ORIENTATION: MID

## 2024-10-16 ASSESSMENT — PAIN DESCRIPTION - LOCATION
LOCATION: ABDOMEN
LOCATION: ABDOMEN;HEAD
LOCATION: ABDOMEN;HEAD

## 2024-10-16 ASSESSMENT — PAIN - FUNCTIONAL ASSESSMENT: PAIN_FUNCTIONAL_ASSESSMENT: PREVENTS OR INTERFERES SOME ACTIVE ACTIVITIES AND ADLS

## 2024-10-16 NOTE — DISCHARGE INSTR - COC
Score:  Readmission Risk              Risk of Unplanned Readmission:  12           Discharging to Facility/ Agency   Name: [facility]  Address:  Phone:  Fax:    Dialysis Facility (if applicable)   Name:  Address:  Dialysis Schedule:  Phone:  Fax:    / signature: Electronically signed by Randi Fountain on 10/16/24 at 6:19 PM EDT    PHYSICIAN SECTION    Prognosis: Good    Condition at Discharge: Stable    Rehab Potential (if transferring to Rehab): Good    Recommended Labs or Other Treatments After Discharge:     Physician Certification: I certify the above information and transfer of Eunice Ortega  is necessary for the continuing treatment of the diagnosis listed and that she requires {Admit to Appropriate Level of Care:73996} for {GREATER/LESS:653777504} 30 days.     Update Admission H&P: {CHP DME Changes in HandP:[id number]}    PHYSICIAN SIGNATURE:  {Esignature:[id number]}

## 2024-10-16 NOTE — PROGRESS NOTES
[facility]  Nurse  Follow up  Note       NAME:  Eunice Ortega  MEDICAL RECORD NUMBER:  [id number]  AGE: 49 y.o.   GENDER: female  : 1975  TODAY'S DATE:  10/14/2024    Subjective   Reason for WOC Nurse Evaluation and Assessment: NPWT dressing changes; colostomy care and education    Wound History:   10/4:  LAPAROTOMY EXPLORATORY, SIGMOIDECTOMY, APPENDCTOMY, END COLOSTOMY with Dr. Lopez for Small bowel obstruction, Perforated diverticulitis.      Current Wound Care Treatment:  NPWT with black granufoam, vacuum -125 mmHg    Objective    BP 93/62   Pulse 67   Temp 97.9 °F (36.6 °C) (Oral)   Resp 14   Ht 1.6 m (5' 2.99\")   Wt 66 kg (145 lb 8.1 oz)   SpO2 96%   BMI 25.78 kg/m²     LABS:  WBC:    Lab Results   Component Value Date/Time    WBC 14.6 10/14/2024 05:29 AM     H/H:    Lab Results   Component Value Date/Time    HGB 9.2 10/14/2024 05:29 AM    HCT 30.6 10/14/2024 05:29 AM       Assessment   Pako Risk Score: Pako Scale Score: 20    Patient Active Problem List   Diagnosis Code    Acute diverticulitis K57.92    Acute cystitis without hematuria N30.00    Anemia D64.9    Hypotension I95.9    Hypokalemia E87.6    Sigmoid diverticulitis K57.32       Measurements:       10/14/24 1359   Colostomy LLQ Descending/sigmoid   Placement Date/Time: 10/04/24 2248   Present on Admission/Arrival: No  Description (optional): COLOSTOMY STOMA  Location: LLQ  Colostomy Type: Descending/sigmoid   Stomal Appliance Changed;1 piece;Flat   Stoma  Assessment Pink;Moist;Protrudes  (25mm x 35 mm oval)   Peristomal Assessment Intact   Mucocutaneous Junction Intact   Treatment Pouch change;Site care;Liquid skin barrier   Stool Appearance Watery   Stool Color Brown   Incision 10/04/24 Abdomen Mid   Date First Assessed/Time First Assessed: 10/04/24 2013   Present on Original Admission: No  Location: Abdomen  Incision Location Orientation: Mid  Incision Description (Comments): vertical abdominal incision   Wound Image  
    PROGRESS NOTE    PATIENT NAME: Eunice Ortega  MEDICAL RECORD NO. [id number]  DATE: 10/7/2024    HD: # 5      DIAGNOSIS AND PLAN  49-year-old female with perforated diverticulitis s/p exploratory laparotomy, sigmoidectomy, appendectomy, and colostomy creation 10/4/2024    NPO, NG, IV fluids, await return of bowel function  IV antibiotics completed.   Plan for wound vac change today  Will consult wound/ostomy for assistance with new stoma management   Encourage ambulation and incentive spirometer use. OOB up to chair today.       SUBJECTIVE  Patient seen and examined at bedside.  No acute events overnight. Her pain has been controlled. She denies nausea/vomiting. No stoma output or gas noted yet. She has gotten up out of bed.     OBJECTIVE  VITALS:   Vitals:    10/07/24 0732   BP: 110/71   Pulse: 83   Resp: 18   Temp: 98.2 °F (36.8 °C)   SpO2: 95%     Physical Exam  Constitutional:       General: She is not in acute distress.  HENT:      Head: Normocephalic and atraumatic.      Right Ear: External ear normal.      Left Ear: External ear normal.      Nose: Nose normal.      Mouth/Throat:      Mouth: Mucous membranes are moist.   Eyes:      Extraocular Movements: Extraocular movements intact.   Cardiovascular:      Rate and Rhythm: Normal rate.   Pulmonary:      Effort: Pulmonary effort is normal. No respiratory distress.   Abdominal:      General: There is no distension.      Palpations: Abdomen is soft.      Tenderness: There is abdominal tenderness (Appropriately tender near incision). There is no guarding or rebound.      Comments: Stoma in left mid abdomen pink and well perfused.  No output   Musculoskeletal:         General: No deformity.      Cervical back: Neck supple.   Skin:     General: Skin is warm and dry.   Neurological:      Mental Status: She is alert and oriented to person, place, and time.   Psychiatric:         Mood and Affect: Mood normal.         Behavior: Behavior normal.       LAB:  CBC: 
    PROGRESS NOTE    PATIENT NAME: Eunice Ortega  MEDICAL RECORD NO. [id number]  DATE: 10/15/2024    HD: # 13      DIAGNOSIS AND PLAN  49-year-old female with perforated diverticulitis s/p exploratory laparotomy, sigmoidectomy, appendectomy, and colostomy creation 10/4/2024    CT 10/14 shows intraabdominal and left pleural fluid collections plan for percutaneous drainage per IR.  Continue regular diet as tolerated.  Daily labs, leukocytosis improved.  Wound/ostomy following for stoma care and assistance with midline wound VAC.  MWF changes.  Encourage ambulation and incentive spirometer use.  Discharge to home with home care planned.    SUBJECTIVE  Patient seen and examined at bedside.  No acute events overnight.  VSS, afebrile. She is tolerating regular diet but only small amounts, she had some intermittent nausea. Her stoma has not had output overnight. She continues to have some left sided abdominal pain. She is NPO for her procedure this morning.     OBJECTIVE  VITALS:   Vitals:    10/15/24 1043   BP:    Pulse:    Resp: 20   Temp:    SpO2:      Physical Exam  Constitutional:       General: She is not in acute distress.  HENT:      Head: Normocephalic and atraumatic.      Right Ear: External ear normal.      Left Ear: External ear normal.      Nose: Nose normal.      Mouth/Throat:      Mouth: Mucous membranes are moist.   Eyes:      Extraocular Movements: Extraocular movements intact.   Cardiovascular:      Rate and Rhythm: Normal rate.   Pulmonary:      Effort: Pulmonary effort is normal. No respiratory distress.   Abdominal:      General: There is no distension.      Palpations: Abdomen is soft.      Tenderness: There is no abdominal tenderness (Appropriately tender near incision). There is no guarding or rebound.      Comments: Wound VAC intact and midline with good seal appreciated  Stoma pink and viable    Musculoskeletal:         General: No swelling or deformity.      Cervical back: Neck supple.   Skin:     
    PROGRESS NOTE    PATIENT NAME: Eunice Ortega  MEDICAL RECORD NO. [id number]  DATE: 10/10/2024    HD: # 8      DIAGNOSIS AND PLAN  49-year-old female with perforated diverticulitis s/p exploratory laparotomy, sigmoidectomy, appendectomy, and colostomy creation 10/4/2024    NPO, NG, IV fluids, await return of bowel function  Plan for CT with PO/IV contrast due to lack of ostomy OP   TPN and PICC consult placed   IV antibiotics completed. Continue to monitor labs.  Consult wound/ostomy for assistance with new stoma management and future wound vac changes.  Encourage ambulation and incentive spirometer use.  Pain well controlled with ketamine, toradol.       SUBJECTIVE  Patient seen and examined at bedside.  No acute events overnight. Her pain has been controlled. She denies nausea/vomiting. She has had no ostomy OP or gas production. No notable gas production in ostomy pouch     OBJECTIVE  VITALS:   Vitals:    10/10/24 0850   BP:    Pulse:    Resp: 15   Temp:    SpO2:      Physical Exam  Constitutional:       General: She is not in acute distress.  HENT:      Head: Normocephalic and atraumatic.      Right Ear: External ear normal.      Left Ear: External ear normal.      Nose: Nose normal.      Mouth/Throat:      Mouth: Mucous membranes are moist.   Eyes:      Extraocular Movements: Extraocular movements intact.   Cardiovascular:      Rate and Rhythm: Normal rate.   Pulmonary:      Effort: Pulmonary effort is normal. No respiratory distress.   Abdominal:      General: There is no distension.      Palpations: Abdomen is soft.      Tenderness: There is abdominal tenderness (Appropriately tender near incision). There is no guarding or rebound.      Comments: Stoma in left mid abdomen pink and well perfused.  No output   Musculoskeletal:         General: No deformity.      Cervical back: Neck supple.   Skin:     General: Skin is warm and dry.   Neurological:      Mental Status: She is alert and oriented to person, place, 
    PROGRESS NOTE    PATIENT NAME: Eunice Ortega  MEDICAL RECORD NO. [id number]  DATE: 10/14/2024    HD: # 12      DIAGNOSIS AND PLAN  49-year-old female with perforated diverticulitis s/p exploratory laparotomy, sigmoidectomy, appendectomy, and colostomy creation 10/4/2024    Advance as tolerated to regular diet.  Discontinue TPN  Daily labs, leukocytosis, Will check CT abdomen/pelvis/chest today  Wound/ostomy following for stoma care and assistance with midline wound VAC.  MWF changes.  Encourage ambulation and incentive spirometer use.    SUBJECTIVE  Patient seen and examined at bedside.  No acute events overnight.  VSS, afebrile. She had pain near her stoma overnight but is feeling better now. She is tolerating full liquids without nausea/vomiting. Her stoma continues to produce stool/gas.    OBJECTIVE  VITALS:   Vitals:    10/14/24 1219   BP: 108/71   Pulse: 67   Resp: 16   Temp:    SpO2: 96%     Physical Exam  Constitutional:       General: She is not in acute distress.  HENT:      Head: Normocephalic and atraumatic.      Right Ear: External ear normal.      Left Ear: External ear normal.      Nose: Nose normal.      Mouth/Throat:      Mouth: Mucous membranes are moist.   Eyes:      Extraocular Movements: Extraocular movements intact.   Cardiovascular:      Rate and Rhythm: Normal rate.   Pulmonary:      Effort: Pulmonary effort is normal. No respiratory distress.   Abdominal:      General: There is no distension.      Palpations: Abdomen is soft.      Tenderness: There is no abdominal tenderness (Appropriately tender near incision). There is no guarding or rebound.      Comments: Wound VAC intact and midline with good seal appreciated  Stoma pink and viable    Musculoskeletal:         General: No swelling or deformity.      Cervical back: Neck supple.   Skin:     General: Skin is warm and dry.   Neurological:      Mental Status: She is alert and oriented to person, place, and time.   Psychiatric:         Mood 
    PROGRESS NOTE    PATIENT NAME: Eunice Ortega  MEDICAL RECORD NO. [id number]  DATE: 10/16/2024    HD: # 14      DIAGNOSIS AND PLAN  49-year-old female with perforated diverticulitis s/p exploratory laparotomy, sigmoidectomy, appendectomy, and colostomy creation 10/4/2024    CT 10/14 shows intraabdominal and left pleural fluid collections plan s/p percutaneous drainage per IR 10/15  Continue regular diet as tolerated.  Daily labs, leukocytosis improved.  Wound/ostomy following for stoma care and assistance with midline wound VAC.  MWF changes.  Encourage ambulation and incentive spirometer use.  Discharge to home with home care planned.    SUBJECTIVE  Patient seen and examined at bedside.  No acute events overnight.  VSS, afebrile. She is tolerating regular diet and her appetite has improved.  She is having good stoma output. Overall is feeling better just tired.     OBJECTIVE  VITALS:   Vitals:    10/16/24 0730   BP:    Pulse: 59   Resp: 13   Temp: 98.2 °F (36.8 °C)   SpO2: 98%     Physical Exam  Constitutional:       General: She is not in acute distress.  HENT:      Head: Normocephalic and atraumatic.      Right Ear: External ear normal.      Left Ear: External ear normal.      Nose: Nose normal.      Mouth/Throat:      Mouth: Mucous membranes are moist.   Eyes:      Extraocular Movements: Extraocular movements intact.   Cardiovascular:      Rate and Rhythm: Normal rate.   Pulmonary:      Effort: Pulmonary effort is normal. No respiratory distress.   Abdominal:      General: There is no distension.      Palpations: Abdomen is soft.      Tenderness: There is no abdominal tenderness (Appropriately tender near incision). There is no guarding or rebound.      Comments: Wound VAC intact and midline with good seal appreciated  Stoma pink and viable    Musculoskeletal:         General: No swelling or deformity.      Cervical back: Neck supple.   Skin:     General: Skin is warm and dry.   Neurological:      Mental 
    PROGRESS NOTE    PATIENT NAME: Eunice Ortega  MEDICAL RECORD NO. [id number]  DATE: 10/12/2024    HD: # 10      DIAGNOSIS AND PLAN  49-year-old female with perforated diverticulitis s/p exploratory laparotomy, sigmoidectomy, appendectomy, and colostomy creation 10/4/2024    Clear liquid diet.  Will consider advancement once tolerating more.  Continue TPN for now, we will discontinue when oral intake improves  Daily labs  Wound/ostomy following for stoma care and assistance with midline wound VAC.  MWF changes.  Encourage ambulation and incentive spirometer use.    SUBJECTIVE  Patient seen and examined at bedside.  No acute events overnight.  VSS, afebrile.  Continues to have ostomy output.  Tolerated small amounts of clears without any nausea or vomiting.  Does report decreased appetite.    OBJECTIVE  VITALS:   Vitals:    10/12/24 0818   BP: 102/63   Pulse: 64   Resp: 13   Temp: 98.3 °F (36.8 °C)   SpO2: 97%     Physical Exam  Constitutional:       General: She is not in acute distress.  HENT:      Head: Normocephalic and atraumatic.      Right Ear: External ear normal.      Left Ear: External ear normal.      Nose: Nose normal.      Mouth/Throat:      Mouth: Mucous membranes are moist.   Eyes:      Extraocular Movements: Extraocular movements intact.   Cardiovascular:      Rate and Rhythm: Normal rate.   Pulmonary:      Effort: Pulmonary effort is normal. No respiratory distress.   Abdominal:      General: There is no distension.      Palpations: Abdomen is soft.      Tenderness: There is no abdominal tenderness (Appropriately tender near incision). There is no guarding or rebound.      Comments: Wound VAC intact and midline with good seal  Stoma pink and viable left abdomen, stool in ostomy appliance   Musculoskeletal:         General: No deformity.      Cervical back: Neck supple.   Skin:     General: Skin is warm and dry.   Neurological:      Mental Status: She is alert and oriented to person, place, and time. 
    PROGRESS NOTE    PATIENT NAME: Eunice Ortega  MEDICAL RECORD NO. [id number]  DATE: 10/13/2024    HD: # 11      DIAGNOSIS AND PLAN  49-year-old female with perforated diverticulitis s/p exploratory laparotomy, sigmoidectomy, appendectomy, and colostomy creation 10/4/2024    Full liquid diet.  Advance as tolerated.  Discontinue TPN  Daily labs  Wound/ostomy following for stoma care and assistance with midline wound VAC.  MWF changes.  Encourage ambulation and incentive spirometer use.    SUBJECTIVE  Patient seen and examined at bedside.  No acute events overnight.  VSS, afebrile.  Patient feeling well this morning.  Ostomy continues to function well.  She is tolerating clear liquids, and has taken in some clear ensures.  No abdominal pain, nausea, or vomiting.  Wound VAC remains intact with good seal.    OBJECTIVE  VITALS:   Vitals:    10/13/24 0747   BP: 94/63   Pulse: 72   Resp: 17   Temp: 98.1 °F (36.7 °C)   SpO2: 96%     Physical Exam  Constitutional:       General: She is not in acute distress.  HENT:      Head: Normocephalic and atraumatic.      Right Ear: External ear normal.      Left Ear: External ear normal.      Nose: Nose normal.      Mouth/Throat:      Mouth: Mucous membranes are moist.   Eyes:      Extraocular Movements: Extraocular movements intact.   Cardiovascular:      Rate and Rhythm: Normal rate.   Pulmonary:      Effort: Pulmonary effort is normal. No respiratory distress.   Abdominal:      General: There is no distension.      Palpations: Abdomen is soft.      Tenderness: There is no abdominal tenderness (Appropriately tender near incision). There is no guarding or rebound.      Comments: Wound VAC intact and midline with good seal appreciated  Stoma pink and viable    Musculoskeletal:         General: No swelling or deformity.      Cervical back: Neck supple.   Skin:     General: Skin is warm and dry.   Neurological:      Mental Status: She is alert and oriented to person, place, and time. 
    PROGRESS NOTE    PATIENT NAME: Eunice Ortega  MEDICAL RECORD NO. [id number]  DATE: 10/11/2024    HD: # 9      DIAGNOSIS AND PLAN  49-year-old female with perforated diverticulitis s/p exploratory laparotomy, sigmoidectomy, appendectomy, and colostomy creation 10/4/2024    Bowel function returned, clamp NG today, clear liquids as tolerated. Continue TPN until tolerating regular diet.  IV antibiotics completed. Continue to monitor labs.  Consult wound/ostomy for assistance with new stoma management and future wound vac changes.  Encourage ambulation and incentive spirometer use.  Pain well controlled with ketamine, toradol.     SUBJECTIVE  Patient seen and examined at bedside.  No acute events overnight. Her pain has been controlled. She denies nausea/vomiting. She has passed stool per ostomy. She is getting up out of bed.      OBJECTIVE  VITALS:   Vitals:    10/11/24 0826   BP: 123/75   Pulse: 69   Resp: 20   Temp:    SpO2:      Physical Exam  Constitutional:       General: She is not in acute distress.  HENT:      Head: Normocephalic and atraumatic.      Right Ear: External ear normal.      Left Ear: External ear normal.      Nose: Nose normal.      Mouth/Throat:      Mouth: Mucous membranes are moist.   Eyes:      Extraocular Movements: Extraocular movements intact.   Cardiovascular:      Rate and Rhythm: Normal rate.   Pulmonary:      Effort: Pulmonary effort is normal. No respiratory distress.   Abdominal:      General: There is no distension.      Palpations: Abdomen is soft.      Tenderness: There is abdominal tenderness (Appropriately tender near incision). There is no guarding or rebound.      Comments: Stoma in left mid abdomen pink and well perfused with brown stool in bag.   Musculoskeletal:         General: No deformity.      Cervical back: Neck supple.   Skin:     General: Skin is warm and dry.   Neurological:      Mental Status: She is alert and oriented to person, place, and time.   Psychiatric:    
    PROGRESS NOTE    PATIENT NAME: Eunice Ortega  MEDICAL RECORD NO. [id number]  DATE: 10/8/2024    HD: # 6      DIAGNOSIS AND PLAN  49-year-old female with perforated diverticulitis s/p exploratory laparotomy, sigmoidectomy, appendectomy, and colostomy creation 10/4/2024    NPO, NG, IV fluids, await return of bowel function  IV antibiotics completed. Continue to monitor labs.  Consult wound/ostomy for assistance with new stoma management and future wound vac changes.  Encourage ambulation and incentive spirometer use.      SUBJECTIVE  Patient seen and examined at bedside.  No acute events overnight. Her pain has been controlled. She denies nausea/vomiting. No stoma output or gas noted yet. She has gotten up out of bed.     OBJECTIVE  VITALS:   Vitals:    10/08/24 0739   BP: 110/68   Pulse: 74   Resp: 17   Temp: 98.7 °F (37.1 °C)   SpO2: 95%     Physical Exam  Constitutional:       General: She is not in acute distress.  HENT:      Head: Normocephalic and atraumatic.      Right Ear: External ear normal.      Left Ear: External ear normal.      Nose: Nose normal.      Mouth/Throat:      Mouth: Mucous membranes are moist.   Eyes:      Extraocular Movements: Extraocular movements intact.   Cardiovascular:      Rate and Rhythm: Normal rate.   Pulmonary:      Effort: Pulmonary effort is normal. No respiratory distress.   Abdominal:      General: There is no distension.      Palpations: Abdomen is soft.      Tenderness: There is abdominal tenderness (Appropriately tender near incision). There is no guarding or rebound.      Comments: Stoma in left mid abdomen pink and well perfused.  No output   Musculoskeletal:         General: No deformity.      Cervical back: Neck supple.   Skin:     General: Skin is warm and dry.   Neurological:      Mental Status: She is alert and oriented to person, place, and time.   Psychiatric:         Mood and Affect: Mood normal.         Behavior: Behavior normal.       LAB:  CBC:   Recent Labs 
@[email or web address]@    [facility]   IN-PATIENT SERVICE   [facility]    Progress Note    10/5/2024    5:09 PM    Name:   Eunice Ortega  MRN:     [id number]     Acct:      [id number]   Room:   0437/0437-01   Day:  3  Admit Date:  10/2/2024  2:41 PM    PCP:   No primary care provider on file.  Code Status:  Full Code    Subjective:     C/C:   Chief Complaint   Patient presents with    Urinary Frequency    Constipation     RLQ pain     Interval History Status: not changed.     Seen at bedside, hemodynamically stable, afebrile overnight  Yesterday taken urgently to the OR for perforated diverticulitis status post ex lap, sigmoidectomy, appendectomy and colostomy creation   Continues to complain of significant abdominal pain today, denies any nausea or vomiting  Continues to be n.p.o., NG tube to lower intermittent wall suction, on IV fluids  Labs reviewed calcium replaced   continues to be on Zosyn    Brief History:     Per H&P  \"Eunice Ortega is a 49 y.o.  /  female who presents with Urinary Frequency and Constipation (RLQ pain) and is admitted to the hospital for the management of Acute diverticulitis. Patient has a history significant for nephrolithiasis.      Patient presented for abdominal pain for the last 4 days.  She denies nausea, vomiting, or diarrhea.  She reports increased urinary frequency without urgency or hematuria.  She denies chest pain, palpitations, shortness of breath.  She does have a history of hydroureteronephrosis from an obstructing stone.     In the ED, patient noted to be hypotensive 88/49.  Lab workup grossly unremarkable.  UA with moderate leukocytes and moderate bacteria.  Urine culture pending.  CT abdomen pelvis showed acute sigmoid diverticulitis without perforation or obstruction and bilateral punctate nonobstructing renal calculi.  Patient was started on antibiotics and given fluid bolus.  Patient admitted to internal medicine for further 
@[email or web address]@    [facility]   IN-PATIENT SERVICE   [facility]    Progress Note    10/4/2024    6:56 PM    Name:   Eunice Ortega  MRN:     [id number]     Acct:      [id number]   Room:   0437/0437-01   Day:  2  Admit Date:  10/2/2024  2:41 PM    PCP:   No primary care provider on file.  Code Status:  Full Code    Subjective:     C/C:   Chief Complaint   Patient presents with    Urinary Frequency    Constipation     RLQ pain     Interval History Status: not changed.     Seen at bedside, hemodynamically stable Tmax 100.4  Continues to complain of abdominal pain, states that she came in because of abdominal cramps/bloating//constipation/distention, also complained of having fever, bowel movement day before yesterday, states that she has no dysuria however has abdominal discomfort on micturition  Labs reviewed potassium replaced, WBC uptrending, continues to be on Zosyn  Chest x-ray reviewed  GEN surge service consulted .    Brief History:     Per H&P  \"Eunice Ortega is a 49 y.o.  /  female who presents with Urinary Frequency and Constipation (RLQ pain) and is admitted to the hospital for the management of Acute diverticulitis. Patient has a history significant for nephrolithiasis.      Patient presented for abdominal pain for the last 4 days.  She denies nausea, vomiting, or diarrhea.  She reports increased urinary frequency without urgency or hematuria.  She denies chest pain, palpitations, shortness of breath.  She does have a history of hydroureteronephrosis from an obstructing stone.     In the ED, patient noted to be hypotensive 88/49.  Lab workup grossly unremarkable.  UA with moderate leukocytes and moderate bacteria.  Urine culture pending.  CT abdomen pelvis showed acute sigmoid diverticulitis without perforation or obstruction and bilateral punctate nonobstructing renal calculi.  Patient was started on antibiotics and given fluid bolus.  Patient admitted to 
CLINICAL PHARMACY NOTE: MEDS TO BEDS    Total # of Prescriptions Filled: 4   The following medications were delivered to the patient:  Oxycodone  Methocarbamol  Senna  pantoprazole    Additional Documentation:    
[facility]  Office: [phone]  Jaleel Winslow DO, James Maria, DO, Panda Oliveira DO, Thanh Ellington, DO, Jasmin Benavidez MD, Meghan López MD, Angel Branch MD, Sylvia Alejandra MD,  Radhames Nassar MD, Kenisha Dyson MD, Raza Alvarado MD,  Reese Deutsch DO, Tra Davis MD, Tremayne Cole MD, Chris Winslow DO, Olamide Brunner MD,  Asael Cardona DO, Dasha Ely MD, Melvi Luna MD, Rosario Do MD, Jeniffer Martell MD,  Leobardo Ferrera MD, Lisha Masterson MD, Vangie Ford MD, Param Demarco MD, Kwadwo Bui MD, Lupillo Navas MD, Garett Mccoy, DO, Sha Lucero DO, Jason Baker DO, Bill Snowden MD, Shirley Waterhouse, CNP,  Debby Bowman CNP, Garett Naranjo, CNP,  Eunice Choi, DNP, Dottie Smith, CNP, Leeann Farooq, CNP, Porsche Casillas, CNP, Michelle Fleming, CNP, Aishwarya Grande, PA-C, Tessa Mcclellan, PA-C, Harper Mohan, CNP, Adriano Foreman, CNP,  Brooke Branham, CNP, Clarissa Lopez, CNP, Deidra Kurtz, CNP, Arin Bravo, CNS, Yaritza Keating, CNP, Cecilia Yusuf, CNP, Tracy Schwab, CNP         [facility]   IN-PATIENT SERVICE   [facility]    Progress Note    10/3/2024    11:52 AM    Name:   Eunice Ortega  MRN:     [id number]     Acct:      [id number]   Room:   0437/0437-01   Day:  1  Admit Date:  10/2/2024  2:41 PM    PCP:   No primary care provider on file.  Code Status:  Full Code    Subjective:     C/C:   Chief Complaint   Patient presents with    Urinary Frequency    Constipation     RLQ pain     Interval History Status: not changed.     Patient seen and examined at bedside, no acute events overnight.  Continue to feel same intense abdominal pain needing her pain medications around-the-clock  Other than that she denies any chest pain, shortness of breath, chills, fevers  Blood pressure soft  Patient vitals, labs and all providers notes were reviewed,from overnight shift and morning updates were noted and discussed with the 
Comprehensive Nutrition Assessment    Type and Reason for Visit:  Initial, NPO/Clear Liquid    Nutrition Recommendations/Plan:   As medically able, start PO diet as tolerated. Per MD, likely feed trials in AM     Malnutrition Assessment:  Malnutrition Status:  Insufficient data (10/08/24 8549)    Context:  Acute Illness     Findings of the 6 clinical characteristics of malnutrition:  Energy Intake:  50% or less of estimated energy requirements for 5 or more days  Weight Loss:  No significant weight loss     Body Fat Loss:  Unable to assess     Muscle Mass Loss:  Unable to assess    Fluid Accumulation:  No significant fluid accumulation     Strength:  Not Performed    Nutrition Assessment:    Pt with perforated diverticulitis s/p exploratory laparotomy, sigmoidectomy, appendectomy, and colostomy creation 10/4/2024. Pt seen for Day 6 NPO/CLD. Adm acute diverticulitis. NGT suctioning, noted 125 mL output x 24 hours. +flatus. No ostomy output. Awaiting bowel fxn return. PS to MD r/t plans for nutrition, MD reports plan is likely trial feeds in AM. Will continue to monitor. Pt reports eating PTA, slightly less than normal r/t pain but not significantly less per pt. Weights stable. Pt hopeful for diet advancement.    Nutrition Related Findings:    Meds/labs reviewed. +colostomy Wound Type: Surgical Incision, Wound Vac       Current Nutrition Intake & Therapies:    Average Meal Intake: NPO  Average Supplements Intake: NPO  Diet NPO Exceptions are: Sips of Water with Meds, Ice Chips  Additional Calorie Sources:  D5% and 0.9%NaCL with KCl 40 mEq at 100 mL/hr = 408 kcal/d    Anthropometric Measures:  Height: 160 cm (5' 2.99\")  Ideal Body Weight (IBW): 115 lbs (52 kg)    Admission Body Weight: 66.5 kg (146 lb 9.7 oz)  Current Body Weight: 66.5 kg (146 lb 9.7 oz), 127.5 % IBW. Weight Source: Standing Scale  Current BMI (kg/m2): 26  Usual Body Weight: 66.2 kg (146 lb) (per pt)  % Weight Change (Calculated): 0.4  BMI 
Comprehensive Nutrition Assessment    Type and Reason for Visit:  Reassess    Nutrition Recommendations/Plan:   Recommend continue TPN 2 in 1 premix at 41.7 mL/hr with 250 mL IV lipids to provide 1371 kcal, 50 gm protein. Add MVI/trace mineral.  Monitor diet advancement and start ONS with meals as able.  Monitor TPN tolerance, adequacy, weight, labs, GI status, fluid status.     Malnutrition Assessment:  Malnutrition Status:  Insufficient data (10/08/24 1697)    Context:  Acute Illness       Nutrition Assessment:    Pt resting in bed at visit, somnolent. NPO 9 days. +PICC. TPN started yesterday r/t awaiting return of bowel fxn. +flatus, +colostomy output. NGT to be clamped today. Possible diet advancement to Clears, will start Clear ONS as able. Pt agreeable to ONS. No new weight.    Nutrition Related Findings:    Meds/labs reviewed. Wound Type: Surgical Incision, Wound Vac       Current Nutrition Intake & Therapies:    Average Meal Intake: NPO  Average Supplements Intake: NPO  Diet NPO Exceptions are: Sips of Water with Meds, Ice Chips  PN-Adult Premix 5/20 - Standard Electrolytes - Central Line  Current Parenteral Nutrition Orders:  Type and Formula: Premix Central, 2-in-1 Standard   Lipids: 250ml, Daily  Duration: Continuous  Rate/Volume: 41.7 mL/hr (1000 mL/d)  Current PN Order Provides: 1375 kcal, 50 gm protein  Additional Calorie Sources:  potassium chloride 40 mEq in dextrose 5 % and 0.9 % NaCl IVF 1000 mL IVPB = 170 kcal    Anthropometric Measures:  Height: 160 cm (5' 2.99\")  Ideal Body Weight (IBW): 115 lbs (52 kg)    Admission Body Weight: 66.5 kg (146 lb 9.7 oz)  Current Body Weight: 66.5 kg (146 lb 9.7 oz), 127.5 % IBW. Weight Source: Standing Scale  Current BMI (kg/m2): 26  Usual Body Weight: 66.2 kg (146 lb) (per pt)  % Weight Change (Calculated): 0.4  BMI Categories: Overweight (BMI 25.0-29.9)    Estimated Daily Nutrient Needs:  Energy Requirements Based On: Kcal/kg  Weight Used for Energy 
Consulted for End Colostomy Care and Teaching              History:   10/4:  LAPAROTOMY EXPLORATORY, SIGMOIDECTOMY, APPENDCTOMY, END COLOSTOMY with Dr. Lopez for Small bowel obstruction, Perforated diverticulitis.      Patient in darkened room resting in bed; daughter at bedside.  Visit made to assess ostomy appliance. Still no output.    NG cont'd to suction  Discussed VAC change will be done on Friday along with pouch change.  Asked \"will I be knocked out\"; discussed analgesia will be offered beforehand but dressing change does not require her to be knocked out; advised that dressings will be changed in the home after discharge.  C/o sharp abdominal pain.  Advised that change in position with out of bed activity to chair and ambulation will help regain peristalsis and reduce discomfort.  Very willing to get up out of bed and sit in the bedside chair.  Assisted to chair; medicated for pain per RN.     10/09/24 1145   Colostomy LLQ Descending/sigmoid   Placement Date/Time: 10/04/24 2248   Present on Admission/Arrival: No  Description (optional): COLOSTOMY STOMA  Location: LLQ  Colostomy Type: Descending/sigmoid   Stomal Appliance Intact;1 piece   Stool Appearance Other (Comment)  (no fecal output)   Output (mL) 0 ml       
Notified surgery ostomy still doesn't have output this shift. Stomach looks more distended than the beginning of shift. Tenderness to lower quadrants. Pain meds given. Continue LIWS to NG. Surgery will round shortly    
Nutrition Assessment     Type and Reason for Visit: Reassess    Nutrition Recommendations/Plan:   Continue current TPN/IL regimen. No MVI/minerals in new bag per protocol.   Continue CLLIQ diet + ONS TID as tolerated. Preferences noted in [place].   Will monitor labs, diet advancement, PO intake, and POC     Malnutrition Assessment:  Malnutrition Status: Insufficient data    Nutrition Assessment:  Pt advanced to CLLIQ diet + Ensure Clear TID. Pt reports fair intake, drinking ONS. Pt preferences noted in [place]. Discussed w/ pt regarding TPN/IL and weaning as diet advances and PO intake is adeqaute. Pt v/u. TPN/IL reordered w/o changes. No MVI/minerals added to new bag per protocol. Labs: Ca 8, BG WDL. Meds: KCl, ketamine, reglan.    Estimated Daily Nutrient Needs:  Energy (kcal):  0790-7388 kcal/d Weight Used for Energy Requirements: Admission     Protein (g):  65-70 g/d Weight Used for Protein Requirements: Ideal        Fluid (ml/day):  1980 mL or per MD Method Used for Fluid Requirements: ml/Kg (30)    Nutrition Related Findings:   ostomy output on 10/10, +flatus. Wound Type: Surgical Incision, Wound Vac    Current Nutrition Therapies:    PN-Adult Premix 5/20 - Standard Electrolytes - Central Line  ADULT DIET; Clear Liquid  ADULT ORAL NUTRITION SUPPLEMENT; Breakfast, Lunch, Dinner; Clear Liquid Oral Supplement    Anthropometric Measures:  Height: 160 cm (5' 2.99\")  Current Body Wt: 66.5 kg (146 lb 9.7 oz)   BMI: 26    Nutrition Diagnosis:   Inadequate oral intake related to altered GI structure as evidenced by NPO or clear liquid status due to medical condition, GI abnormality    Nutrition Interventions:   Food and/or Nutrient Delivery: Continue Current Diet, Continue Oral Nutrition Supplement, Continue Current Parenteral Nutrition  Nutrition Education/Counseling: Survival skills/brief education completed  Coordination of Nutrition Care: Continue to monitor while inpatient  Plan of Care discussed with: Pt, 
Nutrition Assessment     Type and Reason for Visit: Reassess    Nutrition Recommendations/Plan:   Continue current diet and advance as tolerated. Encourage PO intake as able.   Modify ONS to high kcal/high pro TID  Will monitor diet adv, PO intake, wt, labs, and POC     Malnutrition Assessment:  Malnutrition Status: Insufficient data    Nutrition Assessment:  Pt's diet advanced to FLLIQ diet. ONS modified to high kcal/high pro ONS TID. Intake remains 25-50%. Per physician, d/c TPN/IL. Will continue to monitor PO intake for adequacy. No new labs today. BG  mg/dL x 24 hrs.    Estimated Daily Nutrient Needs:  Energy (kcal):  7210-6766 kcal/d Weight Used for Energy Requirements: Admission     Protein (g):  65-70 g/d Weight Used for Protein Requirements: Ideal        Fluid (ml/day):  1980 mL or per MD Method Used for Fluid Requirements: ml/Kg (30)    Nutrition Related Findings:   +ostomy output on 10/10 Wound Type: Surgical Incision, Wound Vac    Current Nutrition Therapies:    ADULT DIET; Full Liquid  ADULT ORAL NUTRITION SUPPLEMENT; Breakfast, Lunch, Dinner; Standard High Calorie/High Protein Oral Supplement    Anthropometric Measures:  Height: 160 cm (5' 2.99\")  Current Body Wt: 66.5 kg (146 lb 9.7 oz)   BMI: 26    Nutrition Diagnosis:   Inadequate oral intake related to  (decreased ability to consume adequate nutrient intake) as evidenced by intake 26-50% (FLLIQ diet)    Nutrition Interventions:   Food and/or Nutrient Delivery: Continue Current Diet, Modify Oral Nutrition Supplement  Nutrition Education/Counseling: No recommendation at this time  Coordination of Nutrition Care: Continue to monitor while inpatient  Plan of Care discussed with: Pt, family    Goals:  Previous Goal Met: Progressing toward Goal(s)  Goals: PO intake 50% or greater, Meet at least 75% of estimated needs, within 7 days  Specify Other Goals: diet advancement    Nutrition Monitoring and Evaluation:   Behavioral-Environmental Outcomes: 
Oxycodone 5mg was not given @ 1618. Medication fell on floor. Wasted in [employer] and phasically wasted with Alpa Duckworth RN.   
Patient complaining of 10/10 pain at LLQ colostomy site. Patient unable to receive PRN calin until 0330, and scheduled multimodal's until 0400. On call surgery resident, Dr. Yoon notified.  
Spiritual Health History and Assessment/Progress Note  [facility]    Advance Care Planning (Patient not ready to fill out POA paperwork),  ,  ,      Name: Eunice Ortega MRN: [id number]    Age: 49 y.o.     Sex: female   Language: English   Taoism: Yarsanism   Acute diverticulitis     Date: 10/3/2024            Total Time Calculated: 18 min              Spiritual Assessment began in [address] ONC/MED SURG        Referral/Consult From: Nurse   Encounter Overview/Reason: Advance Care Planning (Patient not ready to fill out POA paperwork)  Service Provided For: Patient    Melita, Belief, Meaning:   Patient identifies as spiritual, is connected with a melita tradition or spiritual practice, and has beliefs or practices that help with coping during difficult times  Family/Friends No family/friends present      Importance and Influence:  Patient has spiritual/personal beliefs that influence decisions regarding their health  Family/Friends No family/friends present    Community:  Patient feels well-supported. Support system includes: Parent/s  Family/Friends No family/friends present    Assessment and Plan of Care:    responded to a request for POA paperwork. Patient was awake and talking. Family was not present. Patient looked a bit worried and seemed to be having a rough time. When asked how she was feeling, patient responded, \"not good.\" Patient said she asked for advance directives document but would not want to fill it out today.   left a copy of the document in patient's room and asked patient to call spiritual care for help whenever she would be ready to fill it out.  provided ministry of presence, offered support and prayed with patient. Patient said she was raised Yarsanism. Patient declined sacrament of anointing of the sick.       Patient Interventions include: Facilitated expression of thoughts and feelings, Explored spiritual coping/struggle/distress, and Affirmed coping 
Unable to find narcotic sheet for her ketamine. Wasted 45 mls with Meenu Magaña. Pharmacist notified.  
      Plan of care:   Will follow along during hospital stay for continued support and education as post op activity and  endurance iimproves.  
  Scheduled Meds:    enoxaparin  40 mg SubCUTAneous Daily    methocarbamol (ROBAXIN) 500 mg in sodium chloride 0.9 % 100 mL IVPB  500 mg IntraVENous Q8H    pantoprazole (PROTONIX) 40 mg in sodium chloride (PF) 0.9 % 10 mL injection  40 mg IntraVENous Q12H    piperacillin-tazobactam  3,375 mg IntraVENous Q8H    sodium chloride flush  5-40 mL IntraVENous 2 times per day     Continuous Infusions:    lactated ringers IV soln 100 mL/hr at 10/06/24 0931    sodium chloride 10 mL/hr at 10/05/24 0442     PRN Meds: benzocaine-menthol, morphine, sodium chloride flush, sodium chloride, potassium chloride **OR** potassium alternative oral replacement **OR** potassium chloride, magnesium sulfate, ondansetron **OR** ondansetron, oxyCODONE, oxyCODONE    Data:     Past Medical History:   has no past medical history on file.    Social History:   reports that she has been smoking cigarettes. She has never used smokeless tobacco. She reports current alcohol use. She reports current drug use. Drug: Marijuana (Weed).     Family History: History reviewed. No pertinent family history.    Vitals:  BP (!) 100/59   Pulse 76   Temp 98.1 °F (36.7 °C) (Oral)   Resp 17   Ht 1.6 m (5' 3\")   Wt 66.5 kg (146 lb 9.7 oz)   SpO2 95%   BMI 25.97 kg/m²   Temp (24hrs), Av.9 °F (36.6 °C), Min:97.7 °F (36.5 °C), Max:98.1 °F (36.7 °C)    Recent Labs     10/05/24  1822 10/05/24  2350 10/06/24  0629 10/06/24  1203   POCGLU 105 96 90 87       I/O (24Hr):    Intake/Output Summary (Last 24 hours) at 10/6/2024 1437  Last data filed at 10/6/2024 0600  Gross per 24 hour   Intake 150 ml   Output 1825 ml   Net -1675 ml       Labs:  Hematology:  Recent Labs     10/04/24  1135 10/05/24  0634 10/06/24  0714   WBC 21.1* 18.9* 16.1*   RBC 3.87* 4.16 3.29*   HGB 9.4* 10.2* 8.0*   HCT 31.4* 33.7* 26.7*   MCV 81.1* 81.0* 81.2*   MCH 24.3* 24.5* 24.3*   MCHC 29.9 30.3 30.0   RDW 19.5* 19.6* 19.5*    See Reflexed IPF Result 432   MPV 9.0  --  9.2 
Serosanguinous   Dressing Change Due 10/18/24   Wound Assessment Granulation tissue;Subcutaneous   Gianna-wound Assessment Intact     Patient ambulating in room; just showered.    NPWT dressing changed to mid-abdominal surgical wound using black granufoam and Dermatac drape.  Good seal achieved     Colostomy appliance changed as patient and daughter observed.  Would like to continue in [employer] product line after discharge and agrees to enroll  in [employer] customer support.   Would like to send DME order to [facility] at discharge  Discussed skills needed for home including emptying the pouch; home care will likely change the appliance with VAC changes but directed patient and daughter to short videos available via QR codes in ostomy folder.  The flat CTF is still recommended; abdomen is round, softly distended.        Response to treatment:  With complaints of pain.   Pain Assessment:  Premedicated: Yes       Plan   Plan of Care:    NPWT dressing changes M-W-F using black granufoam, vacuum -125 mmHg     Routine ostomy care. Will change appliance  with VAC changes.  Empty the pouch as needed     Current Diet: ADULT ORAL NUTRITION SUPPLEMENT; Breakfast, Lunch, Dinner; Standard High Calorie/High Protein Oral Supplement  ADULT DIET; Regular; Low Fiber  Diet NPO     Discharge Plan:  Placement for patient upon discharge: home with support    Patient appropriate for Outpatient Wound Care Center: Yes        Patient/Caregiver Teaching:  Level of patient/caregiver understanding able to:   [] Indicates understanding                [x] Needs reinforcement  [] Unsuccessful                                  [x] Verbal Understanding  [] Demonstrated understanding        [] No evidence of learning  [] Refused teaching                           [] N/A  
Assessment Other (Comment)  (subcutaneous)   Drainage Amount Scant (moist but unmeasurable)   Drainage Description Serosanguinous   Gianna-incision Assessment Intact   Negative Pressure Wound Therapy Abdomen Mid   No placement date or time found.   Location: Abdomen  Wound Location Orientation: Mid   $ Standard NPWT >50 sq cm PER TX $ Yes   Wound Type Surgical   Unit Type VAC Ulta   Dressing Type Black Foam   Number of pieces used 4   Cycle Continuous;On   Target Pressure (mmHg) 125   Canister changed? No   Dressing Status New dressing applied   Dressing Changed Changed/New   Drainage Amount Scant   Drainage Description Serosanguinous   Dressing Change Due 10/14/24   Wound Assessment Subcutaneous   Gianna-wound Assessment Intact          Response to treatment:  Well tolerated by patient.     Pain Assessment:    Premedicated: Yes    Plan   Plan of Care:      NPWT dressing changes M-W-F using black granufoam, vacuum -125 mmHg    Routine ostomy care. Will change appliance  with VAC changes.  Empty the pouch as needed    Specialty Bed Required :    [] Low Air Loss   [x] Pressure Redistribution  [] Fluid Immersion  [] Bariatric  [] Total Pressure Relief  [] Other:     Current Diet: PN-Adult Premix 5/20 - Standard Electrolytes - Central Line  PN-Adult Premix 5/20 - Standard Electrolytes - Central Line  ADULT DIET; Clear Liquid  ADULT ORAL NUTRITION SUPPLEMENT; Breakfast, Lunch, Dinner; Clear Liquid Oral Supplement        Patient/Caregiver Teaching:  Level of patient/caregiver understanding able to:   [] Indicates understanding       [x] Needs reinforcement  [] Unsuccessful      [x] Verbal Understanding  [] Demonstrated understanding       [] No evidence of learning  [] Refused teaching         [] N/A     BETHANY JENKINS RN BSN CWON  
10/4/2024 1:24 pm COMPARISON: None. HISTORY: ORDERING SYSTEM PROVIDED HISTORY: eval for PNA TECHNOLOGIST PROVIDED HISTORY: eval for PNA FINDINGS: The cardiomediastinal silhouette is within normal limits. No focal airspace consolidation, pneumothorax, or sizeable pleural effusion. The visualized osseous structures are within normal limits.     No acute cardiopulmonary abnormality.        JARET LONDON, UDAY - CNP  10/9/2024, 10:26 AM          Attending Note      I have reviewed the above [facility] note(s).  I have seen and examined the patient.  I have discussed the findings, established the care plan and recommendations with the Advanced Practice Provider.     Chief Complaint: \"Abd pain\"    Exam:  Post op pain    Plan:  Await GI fx, TPN if no resolution next 24-48 hours.    Kalpesh Reyes MD  10/9/2024  7:18 PM   
COMPARISON: None. HISTORY: ORDERING SYSTEM PROVIDED HISTORY: eval for PNA TECHNOLOGIST PROVIDED HISTORY: eval for PNA FINDINGS: The cardiomediastinal silhouette is within normal limits. No focal airspace consolidation, pneumothorax, or sizeable pleural effusion. The visualized osseous structures are within normal limits.     No acute cardiopulmonary abnormality.        Alessia Kirby,   10/6/2024, 7:07 AM        
COMPARISON: None. HISTORY: ORDERING SYSTEM PROVIDED HISTORY: eval for PNA TECHNOLOGIST PROVIDED HISTORY: eval for PNA FINDINGS: The cardiomediastinal silhouette is within normal limits. No focal airspace consolidation, pneumothorax, or sizeable pleural effusion. The visualized osseous structures are within normal limits.     No acute cardiopulmonary abnormality.     XR ABDOMEN (KUB) (SINGLE AP VIEW)    Result Date: 10/3/2024  EXAMINATION: ONE SUPINE XRAY VIEW(S) OF THE ABDOMEN 10/3/2024 5:55 pm COMPARISON: CT abdomen pelvis dated 10/02/2024. HISTORY: ORDERING SYSTEM PROVIDED HISTORY: abdominal pain/constipation TECHNOLOGIST PROVIDED HISTORY: abdominal pain/constipation FINDINGS: Nonspecific bowel gas pattern is seen with prominent gas-filled small bowel. Evaluation of free air is limited on this supine views.     Nonspecific bowel gas pattern with prominent gas-filled small bowel loops. This may reflect underlying ileus or developing obstruction. RECOMMENDATION: Follow-up examination recommended as clinically warranted.          Alessia Kirby DO  10/5/2024, 6:45 AM        
needed  -s/p parental iron/B12 supplementation ,resume oral supplementation whenever able, further workup of anemia as outpatient.      Will sign off , please call back as needed.    Vangie Turner Sra, MD  10/7/2024  12:38 PM

## 2024-10-16 NOTE — CARE COORDINATION
Transitional Planning:  Call to [facility] about wound vac status [phone]. States is still in pending status. [facility] will need to call back later for update.    Call to [facility] about status and states is in review. [facility] rep did acknowledge that did receive faxed update w/ agency that will be providing care. [address] has accepted and faxed their address and phone number to [facility] on faxed update.    Met w/ patient r/t discharge plan and wants to go home today if possible. Made aware that if can get wound vac approved and delivered to hospital today, will be able to go home.    5:40 PM Call from [facility] (Meghan) that vac has been approved and scheduled to be delivered to patient room at 7 PM tonight. Patient and daughter made aware and patient very happt. Notified GRIFFIN Hein and willcompletmilana MICHAEL. Call to [address] on call to make aware that patient discharging tonight and will need SOC on Friday for vac drsg change. IV ATB discontinued. Vac drsg was changed today and will need to switch out vac's when patient vac is delivered and GRIFFIN Hein and patient ae aware. Daughter will transport patient home.

## 2024-10-16 NOTE — PLAN OF CARE
Problem: Genitourinary - Adult  Goal: Elimination patterns are normal or improving  10/3/2024 0152 by Kimberly Lizarraga RN  Outcome: Progressing  10/3/2024 0152 by Kimberly Lizarraga RN  Outcome: Not Progressing     Problem: Pain  Goal: Verbalizes/displays adequate comfort level or baseline comfort level  10/3/2024 0152 by Kimberly Lizarraga RN  Outcome: Not Progressing  10/3/2024 0152 by Kimberly Lizarraga RN  Outcome: Not Progressing  Flowsheets (Taken 10/2/2024 2038)  Verbalizes/displays adequate comfort level or baseline comfort level:   Encourage patient to monitor pain and request assistance   Assess pain using appropriate pain scale   Administer analgesics based on type and severity of pain and evaluate response   Implement non-pharmacological measures as appropriate and evaluate response   Consider cultural and social influences on pain and pain management   Notify Licensed Independent Practitioner if interventions unsuccessful or patient reports new pain     Problem: Gastrointestinal - Adult  Goal: Able to eat  Description: Able to eat  10/3/2024 0152 by Kimberly Lizarraga RN  Outcome: Not Progressing  10/3/2024 0152 by Kimberly Lizarraga RN  Outcome: Not Progressing  Goal: Able to drink  Description: Able to drink  10/3/2024 0152 by Kimberly Lizarraga RN  Outcome: Not Progressing  10/3/2024 0152 by Kimberly Lizarraga RN  Outcome: Not Progressing     Problem: Genitourinary - Adult  Goal: Elimination patterns are normal or improving  10/3/2024 0152 by Kimberly Lizarraga RN  Outcome: Progressing  10/3/2024 0152 by Kimberly Lizarraga RN  Outcome: Not Progressing     Problem: Infection - Adult  Goal: Patient exhibits no signs of infection.  10/3/2024 0152 by Kimberly Lizarraga RN  Outcome: Not Progressing  10/3/2024 0152 by Kimberly Lizarraga RN  Outcome: Not Progressing     
  Problem: Infection - Adult  Goal: Patient exhibits no signs of infection.  Outcome: Progressing     Problem: Pain  Goal: Verbalizes/displays adequate comfort level or baseline comfort level  Outcome: Progressing     Problem: Safety - Adult  Goal: Free from fall injury  Outcome: Progressing     
  Problem: Pain  Goal: Verbalizes/displays adequate comfort level or baseline comfort level  10/12/2024 1233 by Callie Ann RN  Outcome: Progressing  10/11/2024 2247 by Delaney Garza RN  Outcome: Progressing  Flowsheets (Taken 10/11/2024 1947)  Verbalizes/displays adequate comfort level or baseline comfort level:   Encourage patient to monitor pain and request assistance   Assess pain using appropriate pain scale   Administer analgesics based on type and severity of pain and evaluate response   Implement non-pharmacological measures as appropriate and evaluate response   Consider cultural and social influences on pain and pain management   Notify Licensed Independent Practitioner if interventions unsuccessful or patient reports new pain     Problem: Gastrointestinal - Adult  Goal: Able to eat  Description: Able to eat  10/12/2024 1233 by Callie Ann RN  Outcome: Progressing  10/11/2024 2247 by Delaney Garza RN  Outcome: Progressing  Goal: Able to drink  Description: Able to drink  10/12/2024 1233 by Callie Ann RN  Outcome: Progressing  10/11/2024 2247 by Delaney Garza RN  Outcome: Progressing     Problem: Genitourinary - Adult  Goal: Elimination patterns are normal or improving  10/12/2024 1233 by Callie Ann RN  Outcome: Progressing  10/11/2024 2247 by Delaney Garza RN  Outcome: Progressing     Problem: Infection - Adult  Goal: Patient exhibits no signs of infection.  10/12/2024 1233 by Callie Ann RN  Outcome: Progressing  10/11/2024 2247 by Delaney Garza RN  Outcome: Progressing     Problem: Discharge Planning  Goal: Discharge to home or other facility with appropriate resources  10/12/2024 1233 by Callie Ann RN  Outcome: Progressing  10/11/2024 2247 by Delaney Garza RN  Outcome: Progressing  Flowsheets (Taken 10/11/2024 2000)  Discharge to home or other facility with appropriate resources:   Identify barriers to discharge 
  Problem: Pain  Goal: Verbalizes/displays adequate comfort level or baseline comfort level  10/13/2024 1745 by Clem Rae RN  Outcome: Progressing  10/13/2024 0447 by Shital Mcgarry RN  Outcome: Progressing  Flowsheets  Taken 10/13/2024 0340  Verbalizes/displays adequate comfort level or baseline comfort level:   Encourage patient to monitor pain and request assistance   Assess pain using appropriate pain scale   Administer analgesics based on type and severity of pain and evaluate response  Taken 10/12/2024 1915  Verbalizes/displays adequate comfort level or baseline comfort level:   Encourage patient to monitor pain and request assistance   Assess pain using appropriate pain scale   Administer analgesics based on type and severity of pain and evaluate response     Problem: Gastrointestinal - Adult  Goal: Able to eat  Description: Able to eat  10/13/2024 1745 by Clem Rae RN  Outcome: Progressing  10/13/2024 0447 by Shital Mcgarry RN  Outcome: Progressing  Goal: Able to drink  Description: Able to drink  10/13/2024 1745 by Clem Rae RN  Outcome: Progressing  10/13/2024 0447 by Shital Mcgarry RN  Outcome: Progressing  Goal: Minimal or absence of nausea and vomiting  10/13/2024 1745 by Clem Rae RN  Outcome: Progressing  10/13/2024 0447 by Shital Mcgarry RN  Outcome: Progressing  Goal: Maintains or returns to baseline bowel function  10/13/2024 1745 by Clem Rae RN  Outcome: Progressing  10/13/2024 0447 by Shital Mcgarry RN  Outcome: Progressing  Goal: Maintains adequate nutritional intake  10/13/2024 1745 by Clem Rae RN  Outcome: Progressing  10/13/2024 0447 by Shital Mcgarry RN  Outcome: Progressing     Problem: Genitourinary - Adult  Goal: Elimination patterns are normal or improving  10/13/2024 1745 by Clem Rae RN  Outcome: Progressing  10/13/2024 0447 by Shital Mcgarry RN  Outcome: Progressing  Goal: 
  Problem: Pain  Goal: Verbalizes/displays adequate comfort level or baseline comfort level  10/13/2024 2119 by Alessia Westbrook RN  Outcome: Progressing  10/13/2024 1745 by Clem Rae RN  Outcome: Progressing     Problem: Gastrointestinal - Adult  Goal: Able to eat  Description: Able to eat  10/13/2024 2119 by Alessia Westbrook RN  Outcome: Progressing  10/13/2024 1745 by Clem Rae RN  Outcome: Progressing  Goal: Able to drink  Description: Able to drink  10/13/2024 2119 by Alessia Westbrook RN  Outcome: Progressing  10/13/2024 1745 by Clem Rae RN  Outcome: Progressing  Goal: Minimal or absence of nausea and vomiting  10/13/2024 2119 by Alessia Westbrook RN  Outcome: Progressing  10/13/2024 1745 by Clem Rae RN  Outcome: Progressing  Goal: Maintains or returns to baseline bowel function  10/13/2024 2119 by Alessia Westbrook RN  Outcome: Progressing  10/13/2024 1745 by Clem Rae RN  Outcome: Progressing  Goal: Maintains adequate nutritional intake  10/13/2024 2119 by Alessia Westbrook RN  Outcome: Progressing  10/13/2024 1745 by Clem Rae RN  Outcome: Progressing     Problem: Genitourinary - Adult  Goal: Elimination patterns are normal or improving  10/13/2024 2119 by Alessia Westbrook RN  Outcome: Progressing  10/13/2024 1745 by Clem Rae RN  Outcome: Progressing  Goal: Absence of urinary retention  10/13/2024 2119 by Alessia Westbrook RN  Outcome: Progressing  10/13/2024 1745 by Clem Rae RN  Outcome: Progressing     Problem: Infection - Adult  Goal: Patient exhibits no signs of infection.  10/13/2024 2119 by Alessia Westbrook RN  Outcome: Progressing  10/13/2024 1745 by Clem Rae RN  Outcome: Progressing  Goal: Absence of infection at discharge  10/13/2024 2119 by Alessia Westbrook RN  Outcome: Progressing  10/13/2024 1745 by Clem Rae RN  Outcome: Progressing  Goal: Absence of infection during hospitalization  10/13/2024 2119 by Alessia Westbrook, 
  Problem: Pain  Goal: Verbalizes/displays adequate comfort level or baseline comfort level  10/15/2024 1336 by Nena Redd RN  Outcome: Progressing  10/15/2024 0739 by Rosemary Gonzalez RN  Outcome: Progressing     Problem: Gastrointestinal - Adult  Goal: Able to eat  Description: Able to eat  10/15/2024 1336 by Nena Redd RN  Outcome: Progressing  10/15/2024 0739 by Rosemary Gonzalez RN  Outcome: Progressing  Goal: Able to drink  Description: Able to drink  10/15/2024 1336 by Nena Redd RN  Outcome: Progressing  10/15/2024 0739 by Rosemary Gonzalez RN  Outcome: Progressing  Goal: Minimal or absence of nausea and vomiting  10/15/2024 1336 by Nena Redd RN  Outcome: Progressing  10/15/2024 0739 by Rosemary Gonzalez RN  Outcome: Progressing  Goal: Maintains or returns to baseline bowel function  10/15/2024 1336 by Nena Redd RN  Outcome: Progressing  10/15/2024 0739 by Rosemary Gonzalez RN  Outcome: Progressing  Goal: Maintains adequate nutritional intake  10/15/2024 1336 by Nena Redd RN  Outcome: Progressing  10/15/2024 0739 by Rosemary Gonzalez RN  Outcome: Progressing     Problem: Genitourinary - Adult  Goal: Elimination patterns are normal or improving  10/15/2024 1336 by Nena Redd RN  Outcome: Progressing  10/15/2024 0739 by Rosemary Gonzalez RN  Outcome: Progressing  Goal: Absence of urinary retention  10/15/2024 1336 by Nena Redd RN  Outcome: Progressing  10/15/2024 0739 by Rosemary Gonzalez RN  Outcome: Progressing     Problem: Infection - Adult  Goal: Patient exhibits no signs of infection.  10/15/2024 1336 by Nena Redd RN  Outcome: Progressing  10/15/2024 0739 by Rosemary Gonzalez RN  Outcome: Progressing  Goal: Absence of infection at discharge  10/15/2024 1336 by Nena Redd RN  Outcome: Progressing  10/15/2024 0739 by Rosemary Gonzalez RN  Outcome: Progressing  Goal: Absence of infection during hospitalization  10/15/2024 1336 by Nena Redd RN  Outcome: 
  Problem: Pain  Goal: Verbalizes/displays adequate comfort level or baseline comfort level  10/16/2024 0902 by Melvina Ramirez RN  Outcome: Progressing  10/16/2024 0356 by Rosemary Gonzalez RN  Outcome: Progressing     Problem: Gastrointestinal - Adult  Goal: Able to eat  Description: Able to eat  10/16/2024 0902 by Melvina Ramirez RN  Outcome: Progressing  10/16/2024 0356 by Rosemary Gonzalez RN  Outcome: Progressing  Goal: Able to drink  Description: Able to drink  10/16/2024 0902 by Melvina Ramirez RN  Outcome: Progressing  10/16/2024 0356 by Rosemary Gonzalez RN  Outcome: Progressing  Goal: Minimal or absence of nausea and vomiting  10/16/2024 0902 by Melvina Ramirez RN  Outcome: Progressing  10/16/2024 0356 by Rosemary Gonzalez RN  Outcome: Progressing  Goal: Maintains or returns to baseline bowel function  10/16/2024 0902 by Melvina Ramirez RN  Outcome: Progressing  10/16/2024 0356 by Rosemary Gonzalez RN  Outcome: Progressing  Goal: Maintains adequate nutritional intake  10/16/2024 0902 by Melvina Ramirez RN  Outcome: Progressing  10/16/2024 0356 by Rosemary Gonzalez RN  Outcome: Progressing     Problem: Genitourinary - Adult  Goal: Elimination patterns are normal or improving  10/16/2024 0902 by Melvina Ramirez RN  Outcome: Progressing  10/16/2024 0356 by Rosemary Gonzalez RN  Outcome: Progressing  Goal: Absence of urinary retention  10/16/2024 0902 by Melvina Ramirez RN  Outcome: Progressing  10/16/2024 0356 by Rosemary Gonzalez RN  Outcome: Progressing     Problem: Infection - Adult  Goal: Patient exhibits no signs of infection.  10/16/2024 0902 by Melvina Ramirez RN  Outcome: Progressing  10/16/2024 0356 by Rosemary Gonzalez RN  Outcome: Progressing  Goal: Absence of infection at discharge  10/16/2024 0902 by Melvina Ramirez RN  Outcome: Progressing  10/16/2024 0356 by Rosemary Gonzalez RN  Outcome: Progressing  Goal: Absence of infection during hospitalization  10/16/2024 0902 by Melvina Ramirez 
  Problem: Pain  Goal: Verbalizes/displays adequate comfort level or baseline comfort level  10/7/2024 1703 by Marimar Hickey RN  Outcome: Progressing  10/7/2024 0319 by Tc Chris RN  Outcome: Progressing     Problem: Gastrointestinal - Adult  Goal: Able to eat  Description: Able to eat  10/7/2024 1703 by Marimar Hickey RN  Outcome: Progressing  10/7/2024 0319 by Tc Chris RN  Outcome: Progressing  Goal: Able to drink  Description: Able to drink  10/7/2024 1703 by Marimar Hickey RN  Outcome: Progressing  10/7/2024 0319 by Tc Chris RN  Outcome: Progressing     Problem: Genitourinary - Adult  Goal: Elimination patterns are normal or improving  10/7/2024 1703 by Marimar Hickey RN  Outcome: Progressing  10/7/2024 0319 by Tc Chris RN  Outcome: Progressing     Problem: Infection - Adult  Goal: Patient exhibits no signs of infection.  10/7/2024 1703 by Marimar Hickey RN  Outcome: Progressing  10/7/2024 0319 by Tc Chris RN  Outcome: Progressing     Problem: Discharge Planning  Goal: Discharge to home or other facility with appropriate resources  10/7/2024 0319 by Tc Chris RN  Outcome: Progressing     Problem: Safety - Adult  Goal: Free from fall injury  10/7/2024 1703 by Marimar Hickey RN  Outcome: Progressing  10/7/2024 0319 by Tc Chris RN  Outcome: Progressing     Problem: Skin/Tissue Integrity  Goal: Absence of new skin breakdown  Description: 1.  Monitor for areas of redness and/or skin breakdown  2.  Assess vascular access sites hourly  3.  Every 4-6 hours minimum:  Change oxygen saturation probe site  4.  Every 4-6 hours:  If on nasal continuous positive airway pressure, respiratory therapy assess nares and determine need for appliance change or resting period.  10/7/2024 1703 by Marimar Hickey RN  Outcome: Progressing  10/7/2024 0319 by Tc Chris RN  Outcome: Progressing     Problem: ABCDS Injury Assessment  Goal: Absence of physical injury  10/7/2024 0319 by 
  Problem: Pain  Goal: Verbalizes/displays adequate comfort level or baseline comfort level  10/8/2024 0417 by Radhames Wevaer RN  Outcome: Progressing  10/7/2024 1703 by Marimar Hickey RN  Outcome: Progressing     Problem: Gastrointestinal - Adult  Goal: Able to eat  Description: Able to eat  10/8/2024 0417 by Radhames Weaver RN  Outcome: Progressing  10/7/2024 1703 by Marimar Hickey RN  Outcome: Progressing  Goal: Able to drink  Description: Able to drink  10/8/2024 0417 by Radhames Weaver RN  Outcome: Progressing  10/7/2024 1703 by Marimar Hickey RN  Outcome: Progressing     Problem: Genitourinary - Adult  Goal: Elimination patterns are normal or improving  10/8/2024 0417 by Radhames Weaver RN  Outcome: Progressing  10/7/2024 1703 by Marimar Hickey RN  Outcome: Progressing     Problem: Infection - Adult  Goal: Patient exhibits no signs of infection.  10/8/2024 0417 by Radhames Weaver RN  Outcome: Progressing  10/7/2024 1703 by Marimar Hickey RN  Outcome: Progressing     Problem: Discharge Planning  Goal: Discharge to home or other facility with appropriate resources  Outcome: Progressing     Problem: Safety - Adult  Goal: Free from fall injury  10/8/2024 0417 by Radhames Weaver RN  Outcome: Progressing  10/7/2024 1703 by Marimar Hickey RN  Outcome: Progressing     Problem: Skin/Tissue Integrity  Goal: Absence of new skin breakdown  Description: 1.  Monitor for areas of redness and/or skin breakdown  2.  Assess vascular access sites hourly  3.  Every 4-6 hours minimum:  Change oxygen saturation probe site  4.  Every 4-6 hours:  If on nasal continuous positive airway pressure, respiratory therapy assess nares and determine need for appliance change or resting period.  10/8/2024 0417 by Radhames Weaver RN  Outcome: Progressing  10/7/2024 1703 by Marimar Hickey RN  Outcome: Progressing     Problem: ABCDS Injury Assessment  Goal: Absence of physical injury  Outcome: Progressing     
  Problem: Pain  Goal: Verbalizes/displays adequate comfort level or baseline comfort level  Outcome: Progressing     Problem: Gastrointestinal - Adult  Goal: Able to eat  Description: Able to eat  Outcome: Progressing     Problem: Genitourinary - Adult  Goal: Elimination patterns are normal or improving  Outcome: Progressing     Problem: Infection - Adult  Goal: Patient exhibits no signs of infection.  Outcome: Progressing     Problem: Discharge Planning  Goal: Discharge to home or other facility with appropriate resources  Outcome: Progressing     Problem: Safety - Adult  Goal: Free from fall injury  Outcome: Progressing     
  Problem: Pain  Goal: Verbalizes/displays adequate comfort level or baseline comfort level  Outcome: Progressing     Problem: Gastrointestinal - Adult  Goal: Able to eat  Description: Able to eat  Outcome: Progressing  Goal: Able to drink  Description: Able to drink  Outcome: Progressing     Problem: Genitourinary - Adult  Goal: Elimination patterns are normal or improving  Outcome: Progressing     Problem: Infection - Adult  Goal: Patient exhibits no signs of infection.  Outcome: Progressing     
  Problem: Pain  Goal: Verbalizes/displays adequate comfort level or baseline comfort level  Outcome: Progressing     Problem: Gastrointestinal - Adult  Goal: Able to eat  Description: Able to eat  Outcome: Progressing  Goal: Able to drink  Description: Able to drink  Outcome: Progressing     Problem: Genitourinary - Adult  Goal: Elimination patterns are normal or improving  Outcome: Progressing     Problem: Infection - Adult  Goal: Patient exhibits no signs of infection.  Outcome: Progressing     
  Problem: Pain  Goal: Verbalizes/displays adequate comfort level or baseline comfort level  Outcome: Progressing     Problem: Gastrointestinal - Adult  Goal: Able to eat  Description: Able to eat  Outcome: Progressing  Goal: Able to drink  Description: Able to drink  Outcome: Progressing     Problem: Genitourinary - Adult  Goal: Elimination patterns are normal or improving  Outcome: Progressing     Problem: Infection - Adult  Goal: Patient exhibits no signs of infection.  Outcome: Progressing     Problem: Discharge Planning  Goal: Discharge to home or other facility with appropriate resources  Outcome: Progressing     Problem: Safety - Adult  Goal: Free from fall injury  Outcome: Progressing     Problem: Skin/Tissue Integrity  Goal: Absence of new skin breakdown  Description: 1.  Monitor for areas of redness and/or skin breakdown  2.  Assess vascular access sites hourly  3.  Every 4-6 hours minimum:  Change oxygen saturation probe site  4.  Every 4-6 hours:  If on nasal continuous positive airway pressure, respiratory therapy assess nares and determine need for appliance change or resting period.  Outcome: Progressing     Problem: ABCDS Injury Assessment  Goal: Absence of physical injury  Outcome: Progressing     Problem: Nutrition Deficit:  Goal: Optimize nutritional status  10/10/2024 1859 by Inés Doan RN  Outcome: Progressing  10/10/2024 1238 by Kim Rodrigues RD  Outcome: Progressing  Flowsheets (Taken 10/10/2024 1227)  Nutrient intake appropriate for improving, restoring, or maintaining nutritional needs:   Assess nutritional status and recommend course of action   Recommend, monitor, and adjust tube feedings and TPN/PPN based on assessed needs     
  Problem: Pain  Goal: Verbalizes/displays adequate comfort level or baseline comfort level  Outcome: Progressing     Problem: Gastrointestinal - Adult  Goal: Able to eat  Description: Able to eat  Outcome: Progressing  Goal: Able to drink  Description: Able to drink  Outcome: Progressing     Problem: Genitourinary - Adult  Goal: Elimination patterns are normal or improving  Outcome: Progressing     Problem: Infection - Adult  Goal: Patient exhibits no signs of infection.  Outcome: Progressing     Problem: Discharge Planning  Goal: Discharge to home or other facility with appropriate resources  Outcome: Progressing     Problem: Safety - Adult  Goal: Free from fall injury  Outcome: Progressing     Problem: Skin/Tissue Integrity  Goal: Absence of new skin breakdown  Description: 1.  Monitor for areas of redness and/or skin breakdown  2.  Assess vascular access sites hourly  3.  Every 4-6 hours minimum:  Change oxygen saturation probe site  4.  Every 4-6 hours:  If on nasal continuous positive airway pressure, respiratory therapy assess nares and determine need for appliance change or resting period.  Outcome: Progressing     Problem: ABCDS Injury Assessment  Goal: Absence of physical injury  Outcome: Progressing     Problem: Nutrition Deficit:  Goal: Optimize nutritional status  10/11/2024 1835 by Inés Doan RN  Outcome: Progressing  10/11/2024 1333 by Kim Rodrigues RD  Outcome: Progressing  Flowsheets (Taken 10/11/2024 1321)  Nutrient intake appropriate for improving, restoring, or maintaining nutritional needs:   Assess nutritional status and recommend course of action   Recommend, monitor, and adjust tube feedings and TPN/PPN based on assessed needs     
  Problem: Pain  Goal: Verbalizes/displays adequate comfort level or baseline comfort level  Outcome: Progressing     Problem: Gastrointestinal - Adult  Goal: Able to eat  Description: Able to eat  Outcome: Progressing  Goal: Able to drink  Description: Able to drink  Outcome: Progressing     Problem: Genitourinary - Adult  Goal: Elimination patterns are normal or improving  Outcome: Progressing     Problem: Infection - Adult  Goal: Patient exhibits no signs of infection.  Outcome: Progressing     Problem: Discharge Planning  Goal: Discharge to home or other facility with appropriate resources  Outcome: Progressing     Problem: Safety - Adult  Goal: Free from fall injury  Outcome: Progressing     Problem: Skin/Tissue Integrity  Goal: Absence of new skin breakdown  Description: 1.  Monitor for areas of redness and/or skin breakdown  2.  Assess vascular access sites hourly  3.  Every 4-6 hours minimum:  Change oxygen saturation probe site  4.  Every 4-6 hours:  If on nasal continuous positive airway pressure, respiratory therapy assess nares and determine need for appliance change or resting period.  Outcome: Progressing     Problem: ABCDS Injury Assessment  Goal: Absence of physical injury  Outcome: Progressing  Flowsheets (Taken 10/9/2024 1013 by Marily Montes De Oca)  Absence of Physical Injury: Implement safety measures based on patient assessment     Problem: Nutrition Deficit:  Goal: Optimize nutritional status  Outcome: Progressing     
  Problem: Pain  Goal: Verbalizes/displays adequate comfort level or baseline comfort level  Outcome: Progressing     Problem: Gastrointestinal - Adult  Goal: Able to eat  Description: Able to eat  Outcome: Progressing  Goal: Able to drink  Description: Able to drink  Outcome: Progressing  Goal: Minimal or absence of nausea and vomiting  Outcome: Progressing  Goal: Maintains or returns to baseline bowel function  Outcome: Progressing  Goal: Maintains adequate nutritional intake  Outcome: Progressing     Problem: Genitourinary - Adult  Goal: Elimination patterns are normal or improving  Outcome: Progressing  Goal: Absence of urinary retention  Outcome: Progressing     Problem: Infection - Adult  Goal: Patient exhibits no signs of infection.  Outcome: Progressing  Goal: Absence of infection at discharge  Outcome: Progressing  Goal: Absence of infection during hospitalization  Outcome: Progressing  Goal: Absence of fever/infection during anticipated neutropenic period  Outcome: Progressing     Problem: Discharge Planning  Goal: Discharge to home or other facility with appropriate resources  Outcome: Progressing     Problem: Safety - Adult  Goal: Free from fall injury  Outcome: Progressing     Problem: Skin/Tissue Integrity  Goal: Absence of new skin breakdown  Description: 1.  Monitor for areas of redness and/or skin breakdown  2.  Assess vascular access sites hourly  3.  Every 4-6 hours minimum:  Change oxygen saturation probe site  4.  Every 4-6 hours:  If on nasal continuous positive airway pressure, respiratory therapy assess nares and determine need for appliance change or resting period.  Outcome: Progressing     Problem: ABCDS Injury Assessment  Goal: Absence of physical injury  Outcome: Progressing     Problem: Nutrition Deficit:  Goal: Optimize nutritional status  Outcome: Progressing     Problem: Neurosensory - Adult  Goal: Achieves stable or improved neurological status  Outcome: Progressing  Goal: Absence 
  Problem: Pain  Goal: Verbalizes/displays adequate comfort level or baseline comfort level  Outcome: Progressing  Flowsheets  Taken 10/13/2024 0340  Verbalizes/displays adequate comfort level or baseline comfort level:   Encourage patient to monitor pain and request assistance   Assess pain using appropriate pain scale   Administer analgesics based on type and severity of pain and evaluate response  Taken 10/12/2024 1915  Verbalizes/displays adequate comfort level or baseline comfort level:   Encourage patient to monitor pain and request assistance   Assess pain using appropriate pain scale   Administer analgesics based on type and severity of pain and evaluate response     Problem: Gastrointestinal - Adult  Goal: Able to eat  Description: Able to eat  Outcome: Progressing  Goal: Able to drink  Description: Able to drink  Outcome: Progressing  Goal: Minimal or absence of nausea and vomiting  Outcome: Progressing  Goal: Maintains or returns to baseline bowel function  Outcome: Progressing  Goal: Maintains adequate nutritional intake  Outcome: Progressing     Problem: Genitourinary - Adult  Goal: Elimination patterns are normal or improving  Outcome: Progressing  Goal: Absence of urinary retention  Outcome: Progressing     Problem: Infection - Adult  Goal: Patient exhibits no signs of infection.  Outcome: Progressing  Goal: Absence of infection at discharge  Outcome: Progressing  Goal: Absence of infection during hospitalization  Outcome: Progressing  Goal: Absence of fever/infection during anticipated neutropenic period  Outcome: Progressing     Problem: Neurosensory - Adult  Goal: Achieves stable or improved neurological status  Outcome: Progressing  Goal: Absence of seizures  Outcome: Progressing  Goal: Remains free of injury related to seizures activity  Outcome: Progressing  Goal: Achieves maximal functionality and self care  Outcome: Progressing     Problem: Respiratory - Adult  Goal: Achieves optimal 
Patient in no apparent distress at this time.  No falls or new injuries noted.  Complaints of pain addressed with scheduled and prn medications.  Call light left within reach.  
Patient in no apparent distress at this time.  No falls or new injuries noted. Complaints of pain addressed with prn medications.  Call light left within reach.  
  Problem: ABCDS Injury Assessment  Goal: Absence of physical injury  10/8/2024 1745 by Meenu Magaña, RN  Outcome: Progressing  10/8/2024 0417 by Radhames Weaver RN  Outcome: Progressing     Problem: Nutrition Deficit:  Goal: Optimize nutritional status  10/8/2024 1745 by Meenu Magaña RN  Outcome: Progressing  10/8/2024 1600 by Kim Rodrigues RD  Outcome: Progressing  Flowsheets (Taken 10/8/2024 1600)  Nutrient intake appropriate for improving, restoring, or maintaining nutritional needs:   Assess nutritional status and recommend course of action   Monitor oral intake, labs, and treatment plans   Recommend appropriate diets, oral nutritional supplements, and vitamin/mineral supplements     
  Problem: ABCDS Injury Assessment  Goal: Absence of physical injury  Outcome: Progressing     Problem: Nutrition Deficit:  Goal: Optimize nutritional status  Outcome: Progressing     Problem: Neurosensory - Adult  Goal: Achieves stable or improved neurological status  Outcome: Progressing  Goal: Absence of seizures  Outcome: Progressing  Goal: Remains free of injury related to seizures activity  Outcome: Progressing  Goal: Achieves maximal functionality and self care  Outcome: Progressing     Problem: Respiratory - Adult  Goal: Achieves optimal ventilation and oxygenation  Outcome: Progressing     Problem: Cardiovascular - Adult  Goal: Maintains optimal cardiac output and hemodynamic stability  Outcome: Progressing  Goal: Absence of cardiac dysrhythmias or at baseline  Outcome: Progressing     Problem: Skin/Tissue Integrity - Adult  Goal: Skin integrity remains intact  Outcome: Progressing  Flowsheets (Taken 10/14/2024 2010)  Skin Integrity Remains Intact: Monitor for areas of redness and/or skin breakdown  Goal: Incisions, wounds, or drain sites healing without S/S of infection  Outcome: Progressing  Goal: Oral mucous membranes remain intact  Outcome: Progressing     Problem: Musculoskeletal - Adult  Goal: Return mobility to safest level of function  Outcome: Progressing  Goal: Maintain proper alignment of affected body part  Outcome: Progressing  Goal: Return ADL status to a safe level of function  Outcome: Progressing     Problem: Metabolic/Fluid and Electrolytes - Adult  Goal: Electrolytes maintained within normal limits  Outcome: Progressing  Goal: Hemodynamic stability and optimal renal function maintained  Outcome: Progressing  Goal: Glucose maintained within prescribed range  Outcome: Progressing     Problem: Hematologic - Adult  Goal: Maintains hematologic stability  Outcome: Progressing     
appropriate   Identify discharge learning needs (meds, wound care, etc)   Arrange for interpreters to assist at discharge as needed   Refer to discharge planning if patient needs post-hospital services based on physician order or complex needs related to functional status, cognitive ability or social support system  10/11/2024 1835 by Inés Doan RN  Outcome: Progressing     Problem: Safety - Adult  Goal: Free from fall injury  10/11/2024 2247 by Delaney Garza RN  Outcome: Progressing  10/11/2024 1835 by Inés Doan RN  Outcome: Progressing     Problem: Skin/Tissue Integrity  Goal: Absence of new skin breakdown  Description: 1.  Monitor for areas of redness and/or skin breakdown  2.  Assess vascular access sites hourly  3.  Every 4-6 hours minimum:  Change oxygen saturation probe site  4.  Every 4-6 hours:  If on nasal continuous positive airway pressure, respiratory therapy assess nares and determine need for appliance change or resting period.  10/11/2024 2247 by Delaney Garza RN  Outcome: Progressing  10/11/2024 1835 by Inés Doan RN  Outcome: Progressing     Problem: ABCDS Injury Assessment  Goal: Absence of physical injury  10/11/2024 2247 by Delaney Garza RN  Outcome: Progressing  10/11/2024 1835 by Inés Doan RN  Outcome: Progressing     Problem: Nutrition Deficit:  Goal: Optimize nutritional status  10/11/2024 2247 by Delaney Garza RN  Outcome: Progressing  10/11/2024 1835 by Inés Doan RN  Outcome: Progressing  10/11/2024 1333 by Kim Rodrigues RD  Outcome: Progressing  Flowsheets (Taken 10/11/2024 1321)  Nutrient intake appropriate for improving, restoring, or maintaining nutritional needs:   Assess nutritional status and recommend course of action   Recommend, monitor, and adjust tube feedings and TPN/PPN based on assessed needs     
Westbrook, Alessia, RN  Outcome: Progressing  Goal: Oral mucous membranes remain intact  10/14/2024 1111 by Nena Redd RN  Outcome: Progressing  10/13/2024 2119 by Alessia Westbrook RN  Outcome: Progressing     Problem: Musculoskeletal - Adult  Goal: Return mobility to safest level of function  10/14/2024 1111 by Nena Redd RN  Outcome: Progressing  10/13/2024 2119 by Alessia Westbrook RN  Outcome: Progressing  Goal: Maintain proper alignment of affected body part  10/14/2024 1111 by Nena Redd RN  Outcome: Progressing  10/13/2024 2119 by Alessia Westbrook RN  Outcome: Progressing  Goal: Return ADL status to a safe level of function  10/14/2024 1111 by Nena Redd RN  Outcome: Progressing  10/13/2024 2119 by Alessia Westbrook RN  Outcome: Progressing     Problem: Metabolic/Fluid and Electrolytes - Adult  Goal: Electrolytes maintained within normal limits  10/14/2024 1111 by Nena Redd RN  Outcome: Progressing  10/13/2024 2119 by Alessia Westbrook RN  Outcome: Progressing  Goal: Hemodynamic stability and optimal renal function maintained  10/14/2024 1111 by Nena Redd RN  Outcome: Progressing  10/13/2024 2119 by Alessia Westbrook RN  Outcome: Progressing  Goal: Glucose maintained within prescribed range  10/14/2024 1111 by Nena Redd RN  Outcome: Progressing  10/13/2024 2119 by Alessia Westbrook RN  Outcome: Progressing     Problem: Hematologic - Adult  Goal: Maintains hematologic stability  10/14/2024 1111 by Nena Redd RN  Outcome: Progressing  10/13/2024 2119 by Alessia Westbrook RN  Outcome: Progressing     Problem: Discharge Planning  Goal: Discharge to home or other facility with appropriate resources  10/14/2024 1111 by Nena Redd RN  Outcome: Progressing  10/13/2024 2119 by Alessia Westbrook RN  Outcome: Progressing     Problem: Safety - Adult  Goal: Free from fall injury  10/14/2024 1111 by Nena Redd RN  Outcome: Progressing  10/13/2024 2119 by Alessia Westbrook RN  Outcome:

## 2024-10-16 NOTE — DISCHARGE INSTR - DIET

## 2024-10-16 NOTE — CARE COORDINATION
P/S sent to RAFAEL FRYE that need to complete [facility] wound vac paperwork, so can submit application to [facility].    2:30 PM General surgery here and filled out [facility] wound vac paperwork. Call back to [address] about referral since have not received a call back. [facility] options limited since does not have PCP.    3:15 PM [facility] wound vac application and required documents (F/S, H&P, wound note, Op note) sent to [facility] at [phone].

## 2024-10-17 NOTE — DISCHARGE SUMMARY
DISCHARGE SUMMARY:    PATIENT NAME:  Eunice Ortega  YOB: 1975  MEDICAL RECORD NO. [id number]  DATE: 10/17/24  PRIMARY CARE PHYSICIAN: No primary care provider on file.  ADMIT DATE:  10/2/2024    DISCHARGE DATE:  10/16/2024  DISPOSITION:  stable/home  ADMITTING DIAGNOSIS:   Acute Diverticulitis    DIAGNOSIS:   Patient Active Problem List   Diagnosis    Acute diverticulitis    Acute cystitis without hematuria    Anemia    Hypotension    Hypokalemia    Sigmoid diverticulitis       CONSULTANTS:  Internal medicine    PROCEDURES:   10/4/2024: LAPAROTOMY EXPLORATORY, SIGMOIDECTOMY, APPENDCTOMY, END COLOSTOMY   ntraabdominal fluid aspiration; Thoracentesis   HOSPITAL COURSE:   Eunice Ortega is a 49 y.o. female who was admitted on 10/2/2024  Hospital Course:  She was taken to surgery on 10/4/ for Neo, appendectomy, subQ wound vac placement. Her reed was Dc'd on 10/5. She was started on Ketamine and Toradol for postoperative pain. Bowel function was slow to return so she was started on TPN 10/9. CT with contrast showed moderate ascites and pleural effusions on 10/10. She began to have stoma output on 10/11. Her pain was more controlled. NG tube was removed and diet was slowly advanced. CT was repeated on 10/14 due to leukocytosis. It showed multiple intraabdominal fluid collections and left pleural effusion. She was taken to IR 10/15 for percutaneous drainage of the lung and abdomen. Cultures showed no growth. Antibiotics were discontinued. Her leukocytosis resolved.     Labs and imaging were followed daily.      On day of discharge Eunice Ortega  was tolerating a regular diet  had adequate analgesia on oral medications  had no signs of complication.  She was deemed medically stable for discharge to Home with Home Health Care      PHYSICAL EXAMINATION:        Discharge Vitals:  height is 1.6 m (5' 2.99\") and weight is 66 kg (145 lb 8.1 oz). Her oral temperature is 98.4 °F (36.9 °C). Her blood pressure is

## 2024-10-20 LAB
MICROORGANISM SPEC CULT: NORMAL
MICROORGANISM/AGENT SPEC: NORMAL
MICROORGANISM/AGENT SPEC: NORMAL
SPECIMEN DESCRIPTION: NORMAL

## 2024-10-22 ENCOUNTER — OFFICE VISIT (OUTPATIENT)
Age: 49
End: 2024-10-22

## 2024-10-22 VITALS
BODY MASS INDEX: 25.52 KG/M2 | TEMPERATURE: 72 F | HEART RATE: 72 BPM | HEIGHT: 63 IN | DIASTOLIC BLOOD PRESSURE: 58 MMHG | WEIGHT: 144 LBS | SYSTOLIC BLOOD PRESSURE: 85 MMHG

## 2024-10-22 DIAGNOSIS — K57.32 SIGMOID DIVERTICULITIS: Primary | ICD-10-CM

## 2024-10-22 PROCEDURE — 99024 POSTOP FOLLOW-UP VISIT: CPT | Performed by: NURSE PRACTITIONER

## 2024-10-22 NOTE — PROGRESS NOTES
General Surgery   [address] [facility] [facility] [address]  [place] [address]  [phone]  [email or web address]    Clinic Note - Post-op Patient      Patient: Eunice Ortega  MRN: [id number]  YOB: 1975 (49 y.o.)    Date of Office Visit: October 22, 2024     CC: Post op follow up    SUBJECTIVE:  Eunice Ortega is a 49 y.o. female who is seen at the clinic for post op follow up from 10/4/2024: LAPAROTOMY EXPLORATORY, SIGMOIDECTOMY, APPENDCTOMY, END COLOSTOMY , intraabdominal fluid aspiration; Thoracentesis HERNANDEZ.  Tpn and paracentesis for post op abscess.  Dc home.    She is POD#18      She is doing pretty well  Wound osotomy VN comes to her home for vac changes and ostomy care  She had vac change yesterday and will have one tomorrow  Currently getting ostomy bag change with every vac change r/t positioning  I did not take the wound vac down    PLAN:  I think she should continue the course with wound vac and ostomy care  She was given email address to send us pics of her wound  I think every other day colostomy bag change is too often and she thinks her skin is having some breakdown  She may do better with opaque bag as her body concept is impacting her  She will send pics, obtain primary care provider before December  Rtc 1 month then I think if she is ok on her own with vac and ostomy would do 1-2 months and end wound vac with plan rtc for reversal in> 6 months.  She has never had a colonoscopy      Pathology:     Appendix and colon negative path    10/4/24 2106    Surgical Pathology Report Path Number: [id number]    -- Diagnosis --  A.  Colon, sigmoid, segmental resection:  Diverticular disease of colon with focal ruptured acute  diverticulitis, paramuscular abscess formation, acute serositis, and  serosal fibrous adhesions.  Benign reactive pericolic lymph nodes with edema.  Resection margins are viable, with acute serositis.    B.  Vermiform appendix, open appendectomy:  Vermiform

## 2024-11-19 ENCOUNTER — OFFICE VISIT (OUTPATIENT)
Age: 49
End: 2024-11-19

## 2024-11-19 VITALS
BODY MASS INDEX: 25.52 KG/M2 | DIASTOLIC BLOOD PRESSURE: 64 MMHG | HEART RATE: 66 BPM | HEIGHT: 63 IN | WEIGHT: 144 LBS | SYSTOLIC BLOOD PRESSURE: 102 MMHG

## 2024-11-19 DIAGNOSIS — Z98.890 S/P EXPLORATORY LAPAROTOMY: Primary | ICD-10-CM

## 2024-11-19 PROCEDURE — 99024 POSTOP FOLLOW-UP VISIT: CPT | Performed by: NURSE PRACTITIONER

## 2024-11-19 NOTE — PROGRESS NOTES
General Surgery   [id number] [facility] [facility] [address]  [place] [address]  [phone]  [address] [place] [address]  [phone]  [email or web address]    Clinic Note - Post-op Patient      Patient: Eunice Ortega  MRN: [id number]  YOB: 1975 (49 y.o.)    Date of Office Visit: November 19, 2024     CC: Post op follow up    SUBJECTIVE:  Eunice Ortega is a 49 y.o. female who is seen at the [facility] surgery clinic for post op follow up from ex lap, sigmoidectomy, appendectomy and end colostomy 10/4. She was last seen in clinic on 10/22. Wound was being treated with vac. Home care nurse removed vac yesterday and told patient she most likely will not require it anymore. She denies fevers and purulent drainage. She does not have any significant pain. Her colostomy is functioning with brown stool, no concerns/complaints.       Physical Exam:    Vitals:    11/19/24 1234   BP: 102/64   Pulse: 66     Physical Exam  Constitutional:       General: She is not in acute distress.     Appearance: Normal appearance. She is not ill-appearing.   Cardiovascular:      Rate and Rhythm: Normal rate.   Pulmonary:      Effort: Pulmonary effort is normal.   Abdominal:      General: There is no distension.      Palpations: Abdomen is soft.      Tenderness: There is no abdominal tenderness.      Comments: ML wound with w-d dressing  LUQ colostomy with brown stool   Skin:     General: Skin is cool.      Findings: No wound.   Neurological:      General: No focal deficit present.      Mental Status: She is alert and oriented to person, place, and time.             Assessment/Plan:  ex lap, sigmoidectomy, appendectomy and end colostomy  Wound looks great, healing well, no need for wound vac. Continue w-d dressing daily until healed.   Patient would like to discuss colostomy reversal, advised this would likely occur about 6 months out from her primary surgery, which would be around April.

## 2024-12-17 ENCOUNTER — OFFICE VISIT (OUTPATIENT)
Age: 49
End: 2024-12-17

## 2024-12-17 VITALS
DIASTOLIC BLOOD PRESSURE: 73 MMHG | HEART RATE: 80 BPM | WEIGHT: 144 LBS | BODY MASS INDEX: 25.52 KG/M2 | SYSTOLIC BLOOD PRESSURE: 97 MMHG

## 2024-12-17 DIAGNOSIS — K57.92 ACUTE DIVERTICULITIS: Primary | ICD-10-CM

## 2024-12-17 PROCEDURE — 99024 POSTOP FOLLOW-UP VISIT: CPT | Performed by: NURSE PRACTITIONER

## 2024-12-17 NOTE — PROGRESS NOTES
Consulted for Colostomy Care and Teaching          History: 10/4:  LAPAROTOMY EXPLORATORY, SIGMOIDECTOMY, APPENDCTOMY, END COLOSTOMY with Dr. Lopez for Small bowel obstruction, Perforated diverticulitis.         OSTOMY ASSESSMENT:     12/17/24 1333   Colostomy LLQ Descending/sigmoid   Placement Date/Time: 10/04/24 2248   Present on Admission/Arrival: No  Description (optional): COLOSTOMY STOMA  Location: LLQ  Colostomy Type: Descending/sigmoid   Stomal Appliance 1 piece;Changed;Convex   Stoma  Assessment Red;Moist;Protrudes  (25 mm x 30 mm oval)   Peristomal Assessment Denuded;Maceration   Mucocutaneous Junction Intact   Treatment Pouch change;Site care;Stoma powder;Liquid skin barrier       Plan of care:   Peristomal irritant dermatitis due to exposure; cutting the opening a bit too large.  Re-measured and recommend cutting 25 mm x 30 mm oval.  Currently using [employer] Flat CTF #[id number].  Will try both Soft and Light convexity; placed in Convex Light appliance and will request samples from [employer] for Soft Convex.    Eunice will call with her pouch preference after sampling for order update to [facility] if necessary.

## 2024-12-17 NOTE — PROGRESS NOTES
General Surgery   [address], [place] [address]  [phone]    Clinic Note - Established Patient      Patient: Eunice Ortega  MRN: [id number]  YOB: 1975 (49 y.o.)    Date of Office Visit: December 17, 2024     CC: wound check    SUBJECTIVE:  Eunice Ortega is a 49 y.o. female who is seen at the clinic for follow up of her wound. Patient states she is no longer seeing wound care for her midline incision. Patient states she saw her PCP and they sent a referral for a GI doctor. Patient states she does have some redness around her ostomy.        No past medical history on file.    Past Surgical History:   Procedure Laterality Date    ABDOMINAL EXPLORATION SURGERY  10/04/2024    APAROTOMY EXPLORATORY, SIGMOIDECTOMY, APPENDCTOMY, END COLOSTOMY    LAPAROTOMY N/A 10/4/2024    LAPAROTOMY EXPLORATORY, SIGMOIDECTOMY, APPENDCTOMY, END COLOSTOMY performed by Geovanni Lopez MD at [facility] OR       Current Outpatient Medications   Medication Sig Dispense Refill    senna (SENOKOT) 8.6 MG tablet Take 1 tablet by mouth daily      Water For Irrigation, Sterile (STERILE WATER FOR IRRIGATION) Irrigate with as directed for 1 dose. 500 mL 0    pantoprazole (PROTONIX) 40 MG tablet Take 1 tablet by mouth every morning (before breakfast) 30 tablet 3    acetaminophen (TYLENOL) 500 MG tablet Take 2 tablets by mouth every 6 hours as needed for Pain 30 tablet 1     No current facility-administered medications for this visit.       No Known Allergies    No family history on file.    Social History     Socioeconomic History    Marital status: Single     Spouse name: Not on file    Number of children: Not on file    Years of education: Not on file    Highest education level: Not on file   Occupational History    Not on file   Tobacco Use    Smoking status: Former     Types: Cigarettes    Smokeless tobacco: Never   Substance and Sexual Activity    Alcohol use: Yes    Drug use: Yes

## 2025-03-04 ENCOUNTER — TELEPHONE (OUTPATIENT)
Dept: SURGERY | Age: 50
End: 2025-03-04

## 2025-03-04 ENCOUNTER — CLINICAL DOCUMENTATION (OUTPATIENT)
Dept: SURGERY | Age: 50
End: 2025-03-04

## 2025-03-04 NOTE — TELEPHONE ENCOUNTER
Patient contacted. Patient was seen post op on 12/17. Patient RTW letter states that patient is to return to work on 03/12. Patient's workers comp is requesting that office note needs to state patient RTW date in  order for claim to go through. Please advise.

## 2025-03-17 ENCOUNTER — TELEPHONE (OUTPATIENT)
Age: 50
End: 2025-03-17

## 2025-03-17 NOTE — TELEPHONE ENCOUNTER
Unable to leave pt a  message to call the office back to schedule a f/u with (general surgery) to discuss colostomy reversal with Eliot on 3/26 or 4/17. Thank you.

## 2025-03-19 ENCOUNTER — OFFICE VISIT (OUTPATIENT)
Age: 50
End: 2025-03-19
Payer: MEDICAID

## 2025-03-19 VITALS
WEIGHT: 148 LBS | HEART RATE: 69 BPM | SYSTOLIC BLOOD PRESSURE: 128 MMHG | HEIGHT: 63 IN | DIASTOLIC BLOOD PRESSURE: 79 MMHG | BODY MASS INDEX: 26.22 KG/M2

## 2025-03-19 DIAGNOSIS — K57.92 ACUTE DIVERTICULITIS: Primary | ICD-10-CM

## 2025-03-19 PROCEDURE — 99212 OFFICE O/P EST SF 10 MIN: CPT

## 2025-03-19 PROCEDURE — 99213 OFFICE O/P EST LOW 20 MIN: CPT | Performed by: SURGERY

## 2025-03-19 ASSESSMENT — ENCOUNTER SYMPTOMS
EYES NEGATIVE: 1
GASTROINTESTINAL NEGATIVE: 1
RESPIRATORY NEGATIVE: 1

## 2025-03-19 NOTE — H&P (VIEW-ONLY)
Present (11/25/2024)    Hunger Vital Sign     Worried About Running Out of Food in the Last Year: Sometimes true     Ran Out of Food in the Last Year: Sometimes true   Transportation Needs: No Transportation Needs (11/25/2024)    PRAPARE - Transportation     Lack of Transportation (Medical): No     Lack of Transportation (Non-Medical): No   Physical Activity: Not on file   Stress: Not on file   Social Connections: Not on file   Intimate Partner Violence: Unknown (2/23/2024)    Received from [facility], [facility]    [place] [employer]     Fear of Current or Ex-Partner: Not on file     Emotionally Abused: Not on file     Physically Abused: Not on file     Sexually Abused: Not on file     Physically or Sexually Abused: Not on file   Housing Stability: Low Risk  (11/25/2024)    Housing Stability Vital Sign     Unable to Pay for Housing in the Last Year: No     Number of Times Moved in the Last Year: 1     Homeless in the Last Year: No       Family History:   No family history on file.    REVIEW OF SYSTEMS:    Review of Systems   Constitutional: Negative.    HENT: Negative.     Eyes: Negative.    Respiratory: Negative.     Cardiovascular: Negative.    Gastrointestinal: Negative.    Endocrine: Negative.    Genitourinary: Negative.            PHYSICAL EXAM:    VITALS:  /79 (BP Site: Left Upper Arm, Patient Position: Sitting, BP Cuff Size: Medium Adult)   Pulse 69   Ht 1.6 m (5' 2.99\")   Wt 67.1 kg (148 lb)   BMI 26.22 kg/m²   Physical Exam  Constitutional:       Appearance: Normal appearance. She is normal weight.   HENT:      Head: Normocephalic and atraumatic.      Right Ear: External ear normal.      Left Ear: External ear normal.      Nose: Nose normal.      Mouth/Throat:      Mouth: Mucous membranes are moist.   Eyes:      Pupils: Pupils are equal, round, and reactive to light.   Cardiovascular:      Rate and Rhythm: Normal rate and regular rhythm.      Pulses: Normal

## 2025-03-19 NOTE — PROGRESS NOTES
Present (11/25/2024)    Hunger Vital Sign     Worried About Running Out of Food in the Last Year: Sometimes true     Ran Out of Food in the Last Year: Sometimes true   Transportation Needs: No Transportation Needs (11/25/2024)    PRAPARE - Transportation     Lack of Transportation (Medical): No     Lack of Transportation (Non-Medical): No   Physical Activity: Not on file   Stress: Not on file   Social Connections: Not on file   Intimate Partner Violence: Unknown (2/23/2024)    Received from [facility], [facility]    [place] [employer]     Fear of Current or Ex-Partner: Not on file     Emotionally Abused: Not on file     Physically Abused: Not on file     Sexually Abused: Not on file     Physically or Sexually Abused: Not on file   Housing Stability: Low Risk  (11/25/2024)    Housing Stability Vital Sign     Unable to Pay for Housing in the Last Year: No     Number of Times Moved in the Last Year: 1     Homeless in the Last Year: No       Family History:   No family history on file.    REVIEW OF SYSTEMS:    Review of Systems   Constitutional: Negative.    HENT: Negative.     Eyes: Negative.    Respiratory: Negative.     Cardiovascular: Negative.    Gastrointestinal: Negative.    Endocrine: Negative.    Genitourinary: Negative.            PHYSICAL EXAM:    VITALS:  /79 (BP Site: Left Upper Arm, Patient Position: Sitting, BP Cuff Size: Medium Adult)   Pulse 69   Ht 1.6 m (5' 2.99\")   Wt 67.1 kg (148 lb)   BMI 26.22 kg/m²   Physical Exam  Constitutional:       Appearance: Normal appearance. She is normal weight.   HENT:      Head: Normocephalic and atraumatic.      Right Ear: External ear normal.      Left Ear: External ear normal.      Nose: Nose normal.      Mouth/Throat:      Mouth: Mucous membranes are moist.   Eyes:      Pupils: Pupils are equal, round, and reactive to light.   Cardiovascular:      Rate and Rhythm: Normal rate and regular rhythm.      Pulses: Normal

## 2025-03-20 ENCOUNTER — PREP FOR PROCEDURE (OUTPATIENT)
Age: 50
End: 2025-03-20

## 2025-03-20 ENCOUNTER — TELEPHONE (OUTPATIENT)
Age: 50
End: 2025-03-20

## 2025-03-20 DIAGNOSIS — K57.92 DIVERTICULITIS: ICD-10-CM

## 2025-03-20 DIAGNOSIS — K57.92 ACUTE DIVERTICULITIS: Primary | ICD-10-CM

## 2025-03-20 RX ORDER — METRONIDAZOLE 500 MG/1
TABLET ORAL
Qty: 3 TABLET | Refills: 0 | Status: SHIPPED | OUTPATIENT
Start: 2025-03-20 | End: 2025-03-30

## 2025-03-20 RX ORDER — NEOMYCIN SULFATE 500 MG/1
TABLET ORAL
Qty: 6 TABLET | Refills: 0 | Status: SHIPPED | OUTPATIENT
Start: 2025-03-20 | End: 2025-03-30

## 2025-03-20 RX ORDER — POLYETHYLENE GLYCOL 3350 17 G/17G
238 POWDER, FOR SOLUTION ORAL ONCE
Qty: 238 G | Refills: 0 | Status: SHIPPED | OUTPATIENT
Start: 2025-03-20 | End: 2025-03-20

## 2025-03-20 NOTE — TELEPHONE ENCOUNTER
Pt is scheduled for a procedure with Annette on 4/17/25. Can you please send over the bowel prep to [facility] pharmacy Thank you.

## 2025-03-25 RX ORDER — SODIUM CHLORIDE, SODIUM LACTATE, POTASSIUM CHLORIDE, CALCIUM CHLORIDE 600; 310; 30; 20 MG/100ML; MG/100ML; MG/100ML; MG/100ML
INJECTION, SOLUTION INTRAVENOUS CONTINUOUS
OUTPATIENT
Start: 2025-03-25

## 2025-03-25 NOTE — DISCHARGE INSTRUCTIONS
all medications you take, along with the dose of the medications and how often you take it.  If more convenient bring the pharmacy bottles in a zip lock bag.      Please shower the night before and the morning of surgery with an antibacterial soap.  Please use the wipes given to you the night before your surgery after your shower.  Unless otherwise told by your physician, please do not shave legs or any part of your body below your neck the night before or day of your surgery.  You may shave your face or neck.    Brush your teeth but do not swallow water.      Bring your inhaler if you are currently using one.    Bring your eyeglasses and case with you.  No contacts are to be worn the day of surgery.  You also may bring your hearing aids.      Bring your blood band if one has been given to you.  Please do not close the clasp.    If you are on C-PAP or Bi-PAP at home and plan on staying in the hospital overnight for your surgery please bring the machine with you.      Do not wear any jewelry or body piercings day of surgery.  Also, NO lotion, perfume or deodorant to be used the day of surgery.    Do not bring any valuables, such as jewelry, cash or credit cards.  If you are staying overnight with us, please bring a SMALL bag of personal items.  We cannot accommodate large items, like suitcases.      Please wear loose, comfortable clothing.  If you are potentially going to have a cast or brace bring clothing that will fit over them.                                                                                                                          In case of illness - If you have cold or flu like symptoms (high fever, runny nose, sore throat, cough, etc.) rash, nausea, vomiting, loose stools, and/or recent contact with someone who has a contagious disease (chicken pox, measles, etc.) Please call your doctor before coming to the hospital.      If your child is having surgery please make arrangements for any other

## 2025-03-28 ENCOUNTER — HOSPITAL ENCOUNTER (OUTPATIENT)
Age: 50
Discharge: HOME | End: 2025-03-30
Payer: MEDICAID

## 2025-03-28 DIAGNOSIS — K57.92 ACUTE DIVERTICULITIS: ICD-10-CM

## 2025-03-28 PROCEDURE — 74177 CT ABD & PELVIS W/CONTRAST: CPT

## 2025-03-28 PROCEDURE — 6360000004 HC RX CONTRAST MEDICATION: Performed by: NURSE PRACTITIONER

## 2025-03-28 RX ORDER — IOPAMIDOL 755 MG/ML
75 INJECTION, SOLUTION INTRAVASCULAR
Status: COMPLETED | OUTPATIENT
Start: 2025-03-28 | End: 2025-03-28

## 2025-03-28 RX ADMIN — IOPAMIDOL 75 ML: 755 INJECTION, SOLUTION INTRAVENOUS at 14:26

## 2025-03-31 ENCOUNTER — HOSPITAL ENCOUNTER (OUTPATIENT)
Dept: PREADMISSION TESTING | Age: 50
Discharge: HOME | End: 2025-04-04
Payer: MEDICAID

## 2025-03-31 VITALS
SYSTOLIC BLOOD PRESSURE: 95 MMHG | OXYGEN SATURATION: 98 % | DIASTOLIC BLOOD PRESSURE: 62 MMHG | TEMPERATURE: 98.2 F | WEIGHT: 159 LBS | HEART RATE: 74 BPM | RESPIRATION RATE: 18 BRPM | BODY MASS INDEX: 29.26 KG/M2 | HEIGHT: 62 IN

## 2025-03-31 LAB
ANION GAP SERPL CALCULATED.3IONS-SCNC: 9 MMOL/L (ref 9–16)
BUN SERPL-MCNC: 13 MG/DL (ref 6–20)
CALCIUM SERPL-MCNC: 8.9 MG/DL (ref 8.6–10.4)
CHLORIDE SERPL-SCNC: 102 MMOL/L (ref 98–107)
CO2 SERPL-SCNC: 26 MMOL/L (ref 20–31)
CREAT SERPL-MCNC: 0.7 MG/DL (ref 0.6–0.9)
EKG ATRIAL RATE: 62 BPM
EKG P AXIS: 55 DEGREES
EKG P-R INTERVAL: 158 MS
EKG Q-T INTERVAL: 404 MS
EKG QRS DURATION: 78 MS
EKG QTC CALCULATION (BAZETT): 410 MS
EKG R AXIS: 65 DEGREES
EKG T AXIS: 67 DEGREES
EKG VENTRICULAR RATE: 62 BPM
ERYTHROCYTE [DISTWIDTH] IN BLOOD BY AUTOMATED COUNT: 14.9 % (ref 11.8–14.4)
GFR, ESTIMATED: >90 ML/MIN/1.73M2
GLUCOSE SERPL-MCNC: 85 MG/DL (ref 74–99)
HCT VFR BLD AUTO: 36.2 % (ref 36.3–47.1)
HGB BLD-MCNC: 11 G/DL (ref 11.9–15.1)
MCH RBC QN AUTO: 28.3 PG (ref 25.2–33.5)
MCHC RBC AUTO-ENTMCNC: 30.4 G/DL (ref 28.4–34.8)
MCV RBC AUTO: 93.1 FL (ref 82.6–102.9)
NRBC BLD-RTO: 0 PER 100 WBC
PLATELET # BLD AUTO: 321 K/UL (ref 138–453)
PMV BLD AUTO: 9.7 FL (ref 8.1–13.5)
POTASSIUM SERPL-SCNC: 4.4 MMOL/L (ref 3.7–5.3)
RBC # BLD AUTO: 3.89 M/UL (ref 3.95–5.11)
SODIUM SERPL-SCNC: 137 MMOL/L (ref 136–145)
WBC OTHER # BLD: 8.5 K/UL (ref 3.5–11.3)

## 2025-03-31 PROCEDURE — 80048 BASIC METABOLIC PNL TOTAL CA: CPT

## 2025-03-31 PROCEDURE — 93005 ELECTROCARDIOGRAM TRACING: CPT | Performed by: ANESTHESIOLOGY

## 2025-03-31 PROCEDURE — 85027 COMPLETE CBC AUTOMATED: CPT

## 2025-03-31 PROCEDURE — 36415 COLL VENOUS BLD VENIPUNCTURE: CPT

## 2025-03-31 ASSESSMENT — PAIN DESCRIPTION - LOCATION: LOCATION: ABDOMEN

## 2025-03-31 ASSESSMENT — PAIN DESCRIPTION - DESCRIPTORS: DESCRIPTORS: BURNING

## 2025-03-31 ASSESSMENT — PAIN DESCRIPTION - PAIN TYPE: TYPE: ACUTE PAIN;CHRONIC PAIN

## 2025-03-31 ASSESSMENT — PAIN SCALES - GENERAL: PAINLEVEL_OUTOF10: 7

## 2025-03-31 ASSESSMENT — PAIN DESCRIPTION - FREQUENCY: FREQUENCY: CONTINUOUS

## 2025-03-31 ASSESSMENT — PAIN DESCRIPTION - ONSET: ONSET: ON-GOING

## 2025-03-31 NOTE — PROGRESS NOTES
Anesthesia Focused Assessment      STOP-BANG Sleep Apnea Questionnaire    SNORE loudly (heard through closed doors)?   No  TIRED, fatigued, sleepy during daytime?    No  OBSERVED stopping breathing during sleep?   No  High blood PRESSURE being treated?    No    BMI over 35?        No  AGE over 50?        No  NECK circumference over 16\"?     No  GENDER (male)?       No             Total 0  High risk 5-8  Intermediate risk 3-4  Low risk 0-2    Obstructive Sleep Apnea: denies  If YES, machine used: no     Type 1 DM:   no  T2DM:  no    Coronary Artery Disease:  no  Hypertension:  no    Active smoker:  not currently  Drinks alcohol:  socially  Recreational drugs: no    Dentition: benign    Defib / AICD / Pacemaker: no      Renal Failure/dialysis:  no    Patient was evaluated in PAT & anesthesia guidelines were applied.   NPO guidelines, medication instructions and scheduled arrival time were reviewed with patient.  I advised patient to please contact the surgeon's office, ahead of time if possible, if any new signs or symptoms of illness, infection, rash, etc    Hx of anesthesia complications:  no  Family hx of anesthesia complications:  no                                                                                                                     Patient is overall active, without cardiac or pulmonary complaints, METS greater than 4.    JANIA YUAN PA-C  3/31/25  2:22 PM

## 2025-04-17 ENCOUNTER — ANESTHESIA EVENT (OUTPATIENT)
Dept: OPERATING ROOM | Age: 50
End: 2025-04-17
Payer: COMMERCIAL

## 2025-04-17 ENCOUNTER — ANESTHESIA (OUTPATIENT)
Dept: OPERATING ROOM | Age: 50
End: 2025-04-17
Payer: COMMERCIAL

## 2025-04-17 ENCOUNTER — HOSPITAL ENCOUNTER (INPATIENT)
Age: 50
LOS: 4 days | Discharge: HOME HEALTH | DRG: 336 | End: 2025-04-21
Attending: SURGERY | Admitting: SURGERY
Payer: COMMERCIAL

## 2025-04-17 DIAGNOSIS — K57.92 DIVERTICULITIS: ICD-10-CM

## 2025-04-17 DIAGNOSIS — Z98.890 S/P COLOSTOMY TAKEDOWN: Primary | ICD-10-CM

## 2025-04-17 LAB
ANION GAP SERPL CALCULATED.3IONS-SCNC: 12 MMOL/L (ref 9–16)
BASOPHILS # BLD: 0.04 K/UL (ref 0–0.2)
BASOPHILS NFR BLD: 0 % (ref 0–2)
BUN SERPL-MCNC: 11 MG/DL (ref 6–20)
CA-I BLD-SCNC: 1.12 MMOL/L (ref 1.13–1.33)
CALCIUM SERPL-MCNC: 9 MG/DL (ref 8.6–10.4)
CHLORIDE SERPL-SCNC: 104 MMOL/L (ref 98–107)
CO2 SERPL-SCNC: 23 MMOL/L (ref 20–31)
CREAT SERPL-MCNC: 0.9 MG/DL (ref 0.6–0.9)
EOSINOPHIL # BLD: <0.03 K/UL (ref 0–0.44)
EOSINOPHILS RELATIVE PERCENT: 0 % (ref 1–4)
ERYTHROCYTE [DISTWIDTH] IN BLOOD BY AUTOMATED COUNT: 15.1 % (ref 11.8–14.4)
GFR, ESTIMATED: 79 ML/MIN/1.73M2
GLUCOSE SERPL-MCNC: 179 MG/DL (ref 74–99)
HCT VFR BLD AUTO: 39.9 % (ref 36.3–47.1)
HGB BLD-MCNC: 12.4 G/DL (ref 11.9–15.1)
IMM GRANULOCYTES # BLD AUTO: 0.05 K/UL (ref 0–0.3)
IMM GRANULOCYTES NFR BLD: 0 %
LYMPHOCYTES NFR BLD: 0.94 K/UL (ref 1.1–3.7)
LYMPHOCYTES RELATIVE PERCENT: 6 % (ref 24–43)
MAGNESIUM SERPL-MCNC: 1.6 MG/DL (ref 1.6–2.6)
MCH RBC QN AUTO: 28.4 PG (ref 25.2–33.5)
MCHC RBC AUTO-ENTMCNC: 31.1 G/DL (ref 28.4–34.8)
MCV RBC AUTO: 91.5 FL (ref 82.6–102.9)
MONOCYTES NFR BLD: 0.5 K/UL (ref 0.1–1.2)
MONOCYTES NFR BLD: 3 % (ref 3–12)
NEUTROPHILS NFR BLD: 91 % (ref 36–65)
NEUTS SEG NFR BLD: 13.06 K/UL (ref 1.5–8.1)
NRBC BLD-RTO: 0 PER 100 WBC
PHOSPHATE SERPL-MCNC: 4.9 MG/DL (ref 2.5–4.5)
PLATELET # BLD AUTO: 410 K/UL (ref 138–453)
PMV BLD AUTO: 9.2 FL (ref 8.1–13.5)
POTASSIUM SERPL-SCNC: 3.7 MMOL/L (ref 3.7–5.3)
RBC # BLD AUTO: 4.36 M/UL (ref 3.95–5.11)
RBC # BLD: ABNORMAL 10*6/UL
SODIUM SERPL-SCNC: 139 MMOL/L (ref 136–145)
WBC OTHER # BLD: 14.6 K/UL (ref 3.5–11.3)

## 2025-04-17 PROCEDURE — 0DSM0ZZ REPOSITION DESCENDING COLON, OPEN APPROACH: ICD-10-PCS | Performed by: SURGERY

## 2025-04-17 PROCEDURE — 83735 ASSAY OF MAGNESIUM: CPT

## 2025-04-17 PROCEDURE — 6370000000 HC RX 637 (ALT 250 FOR IP)

## 2025-04-17 PROCEDURE — 2580000003 HC RX 258: Performed by: ANESTHESIOLOGY

## 2025-04-17 PROCEDURE — 6360000002 HC RX W HCPCS: Performed by: ANESTHESIOLOGY

## 2025-04-17 PROCEDURE — 6360000002 HC RX W HCPCS: Performed by: NURSE ANESTHETIST, CERTIFIED REGISTERED

## 2025-04-17 PROCEDURE — 3600000004 HC SURGERY LEVEL 4 BASE: Performed by: SURGERY

## 2025-04-17 PROCEDURE — 6360000002 HC RX W HCPCS

## 2025-04-17 PROCEDURE — 0DTJ0ZZ RESECTION OF APPENDIX, OPEN APPROACH: ICD-10-PCS | Performed by: SURGERY

## 2025-04-17 PROCEDURE — 2580000003 HC RX 258

## 2025-04-17 PROCEDURE — 84100 ASSAY OF PHOSPHORUS: CPT

## 2025-04-17 PROCEDURE — 80048 BASIC METABOLIC PNL TOTAL CA: CPT

## 2025-04-17 PROCEDURE — 36415 COLL VENOUS BLD VENIPUNCTURE: CPT

## 2025-04-17 PROCEDURE — 64488 TAP BLOCK BI INJECTION: CPT | Performed by: ANESTHESIOLOGY

## 2025-04-17 PROCEDURE — 6370000000 HC RX 637 (ALT 250 FOR IP): Performed by: SURGERY

## 2025-04-17 PROCEDURE — 2709999900 HC NON-CHARGEABLE SUPPLY: Performed by: SURGERY

## 2025-04-17 PROCEDURE — 3700000001 HC ADD 15 MINUTES (ANESTHESIA): Performed by: SURGERY

## 2025-04-17 PROCEDURE — 7100000000 HC PACU RECOVERY - FIRST 15 MIN: Performed by: SURGERY

## 2025-04-17 PROCEDURE — 0DJD8ZZ INSPECTION OF LOWER INTESTINAL TRACT, VIA NATURAL OR ARTIFICIAL OPENING ENDOSCOPIC: ICD-10-PCS | Performed by: SURGERY

## 2025-04-17 PROCEDURE — 3600000014 HC SURGERY LEVEL 4 ADDTL 15MIN: Performed by: SURGERY

## 2025-04-17 PROCEDURE — 7100000001 HC PACU RECOVERY - ADDTL 15 MIN: Performed by: SURGERY

## 2025-04-17 PROCEDURE — 88304 TISSUE EXAM BY PATHOLOGIST: CPT

## 2025-04-17 PROCEDURE — 2060000000 HC ICU INTERMEDIATE R&B

## 2025-04-17 PROCEDURE — 85025 COMPLETE CBC W/AUTO DIFF WBC: CPT

## 2025-04-17 PROCEDURE — 0DN80ZZ RELEASE SMALL INTESTINE, OPEN APPROACH: ICD-10-PCS | Performed by: SURGERY

## 2025-04-17 PROCEDURE — 3700000000 HC ANESTHESIA ATTENDED CARE: Performed by: SURGERY

## 2025-04-17 PROCEDURE — 44626 REPAIR BOWEL OPENING: CPT | Performed by: SURGERY

## 2025-04-17 PROCEDURE — 2500000003 HC RX 250 WO HCPCS

## 2025-04-17 PROCEDURE — 2720000010 HC SURG SUPPLY STERILE: Performed by: SURGERY

## 2025-04-17 PROCEDURE — 82330 ASSAY OF CALCIUM: CPT

## 2025-04-17 PROCEDURE — 2500000003 HC RX 250 WO HCPCS: Performed by: SURGERY

## 2025-04-17 RX ORDER — PROCHLORPERAZINE EDISYLATE 5 MG/ML
5 INJECTION INTRAMUSCULAR; INTRAVENOUS
Status: DISCONTINUED | OUTPATIENT
Start: 2025-04-17 | End: 2025-04-17 | Stop reason: HOSPADM

## 2025-04-17 RX ORDER — METHOCARBAMOL 750 MG/1
750 TABLET, FILM COATED ORAL
Status: DISCONTINUED | OUTPATIENT
Start: 2025-04-17 | End: 2025-04-21 | Stop reason: HOSPADM

## 2025-04-17 RX ORDER — ONDANSETRON 2 MG/ML
INJECTION INTRAMUSCULAR; INTRAVENOUS
Status: DISCONTINUED | OUTPATIENT
Start: 2025-04-17 | End: 2025-04-17 | Stop reason: SDUPTHER

## 2025-04-17 RX ORDER — SODIUM CHLORIDE, SODIUM LACTATE, POTASSIUM CHLORIDE, CALCIUM CHLORIDE 600; 310; 30; 20 MG/100ML; MG/100ML; MG/100ML; MG/100ML
INJECTION, SOLUTION INTRAVENOUS CONTINUOUS
Status: DISCONTINUED | OUTPATIENT
Start: 2025-04-17 | End: 2025-04-20

## 2025-04-17 RX ORDER — MAGNESIUM SULFATE IN WATER 40 MG/ML
2000 INJECTION, SOLUTION INTRAVENOUS ONCE
Status: COMPLETED | OUTPATIENT
Start: 2025-04-17 | End: 2025-04-17

## 2025-04-17 RX ORDER — MAGNESIUM HYDROXIDE 1200 MG/15ML
LIQUID ORAL CONTINUOUS PRN
Status: DISCONTINUED | OUTPATIENT
Start: 2025-04-17 | End: 2025-04-17 | Stop reason: HOSPADM

## 2025-04-17 RX ORDER — FENTANYL CITRATE 50 UG/ML
INJECTION, SOLUTION INTRAMUSCULAR; INTRAVENOUS
Status: DISCONTINUED | OUTPATIENT
Start: 2025-04-17 | End: 2025-04-17 | Stop reason: SDUPTHER

## 2025-04-17 RX ORDER — SODIUM CHLORIDE 9 MG/ML
INJECTION, SOLUTION INTRAVENOUS PRN
Status: DISCONTINUED | OUTPATIENT
Start: 2025-04-17 | End: 2025-04-17 | Stop reason: HOSPADM

## 2025-04-17 RX ORDER — CEFAZOLIN SODIUM 1 G/3ML
INJECTION, POWDER, FOR SOLUTION INTRAMUSCULAR; INTRAVENOUS
Status: DISCONTINUED | OUTPATIENT
Start: 2025-04-17 | End: 2025-04-17 | Stop reason: SDUPTHER

## 2025-04-17 RX ORDER — SODIUM CHLORIDE, SODIUM LACTATE, POTASSIUM CHLORIDE, CALCIUM CHLORIDE 600; 310; 30; 20 MG/100ML; MG/100ML; MG/100ML; MG/100ML
INJECTION, SOLUTION INTRAVENOUS CONTINUOUS
Status: DISCONTINUED | OUTPATIENT
Start: 2025-04-17 | End: 2025-04-17 | Stop reason: HOSPADM

## 2025-04-17 RX ORDER — ONDANSETRON 2 MG/ML
4 INJECTION INTRAMUSCULAR; INTRAVENOUS EVERY 6 HOURS PRN
Status: DISCONTINUED | OUTPATIENT
Start: 2025-04-17 | End: 2025-04-21 | Stop reason: HOSPADM

## 2025-04-17 RX ORDER — GABAPENTIN 300 MG/1
300 CAPSULE ORAL EVERY 8 HOURS
Status: DISCONTINUED | OUTPATIENT
Start: 2025-04-17 | End: 2025-04-21 | Stop reason: HOSPADM

## 2025-04-17 RX ORDER — ACETAMINOPHEN 500 MG
1000 TABLET ORAL EVERY 8 HOURS SCHEDULED
Status: DISCONTINUED | OUTPATIENT
Start: 2025-04-17 | End: 2025-04-21 | Stop reason: HOSPADM

## 2025-04-17 RX ORDER — BUPIVACAINE HYDROCHLORIDE 2.5 MG/ML
INJECTION, SOLUTION EPIDURAL; INFILTRATION; INTRACAUDAL; PERINEURAL
Status: COMPLETED | OUTPATIENT
Start: 2025-04-17 | End: 2025-04-17

## 2025-04-17 RX ORDER — DEXAMETHASONE SODIUM PHOSPHATE 10 MG/ML
INJECTION, SOLUTION INTRA-ARTICULAR; INTRALESIONAL; INTRAMUSCULAR; INTRAVENOUS; SOFT TISSUE
Status: DISCONTINUED | OUTPATIENT
Start: 2025-04-17 | End: 2025-04-17 | Stop reason: SDUPTHER

## 2025-04-17 RX ORDER — POTASSIUM CHLORIDE 7.45 MG/ML
10 INJECTION INTRAVENOUS
Status: DISPENSED | OUTPATIENT
Start: 2025-04-17 | End: 2025-04-17

## 2025-04-17 RX ORDER — ULTRASOUND COUPLING MEDIUM
GEL (GRAM) TOPICAL PRN
Status: DISCONTINUED | OUTPATIENT
Start: 2025-04-17 | End: 2025-04-17 | Stop reason: HOSPADM

## 2025-04-17 RX ORDER — METRONIDAZOLE 500 MG/100ML
500 INJECTION, SOLUTION INTRAVENOUS EVERY 8 HOURS
Status: DISCONTINUED | OUTPATIENT
Start: 2025-04-17 | End: 2025-04-17

## 2025-04-17 RX ORDER — SODIUM CHLORIDE 0.9 % (FLUSH) 0.9 %
5-40 SYRINGE (ML) INJECTION PRN
Status: DISCONTINUED | OUTPATIENT
Start: 2025-04-17 | End: 2025-04-17 | Stop reason: HOSPADM

## 2025-04-17 RX ORDER — DIPHENHYDRAMINE HYDROCHLORIDE 50 MG/ML
INJECTION, SOLUTION INTRAMUSCULAR; INTRAVENOUS
Status: DISCONTINUED | OUTPATIENT
Start: 2025-04-17 | End: 2025-04-17 | Stop reason: SDUPTHER

## 2025-04-17 RX ORDER — LIDOCAINE HYDROCHLORIDE 10 MG/ML
INJECTION, SOLUTION EPIDURAL; INFILTRATION; INTRACAUDAL; PERINEURAL
Status: DISCONTINUED | OUTPATIENT
Start: 2025-04-17 | End: 2025-04-17 | Stop reason: SDUPTHER

## 2025-04-17 RX ORDER — NALOXONE HYDROCHLORIDE 0.4 MG/ML
INJECTION, SOLUTION INTRAMUSCULAR; INTRAVENOUS; SUBCUTANEOUS PRN
Status: DISCONTINUED | OUTPATIENT
Start: 2025-04-17 | End: 2025-04-17 | Stop reason: HOSPADM

## 2025-04-17 RX ORDER — ENOXAPARIN SODIUM 100 MG/ML
40 INJECTION SUBCUTANEOUS DAILY
Status: DISCONTINUED | OUTPATIENT
Start: 2025-04-18 | End: 2025-04-21 | Stop reason: HOSPADM

## 2025-04-17 RX ORDER — MIDAZOLAM HYDROCHLORIDE 1 MG/ML
INJECTION, SOLUTION INTRAMUSCULAR; INTRAVENOUS
Status: DISCONTINUED | OUTPATIENT
Start: 2025-04-17 | End: 2025-04-17 | Stop reason: SDUPTHER

## 2025-04-17 RX ORDER — CALCIUM GLUCONATE 20 MG/ML
2000 INJECTION, SOLUTION INTRAVENOUS ONCE
Status: COMPLETED | OUTPATIENT
Start: 2025-04-17 | End: 2025-04-17

## 2025-04-17 RX ORDER — METRONIDAZOLE 500 MG/100ML
500 INJECTION, SOLUTION INTRAVENOUS EVERY 8 HOURS
Status: COMPLETED | OUTPATIENT
Start: 2025-04-17 | End: 2025-04-18

## 2025-04-17 RX ORDER — ROCURONIUM BROMIDE 10 MG/ML
INJECTION, SOLUTION INTRAVENOUS
Status: DISCONTINUED | OUTPATIENT
Start: 2025-04-17 | End: 2025-04-17 | Stop reason: SDUPTHER

## 2025-04-17 RX ORDER — ONDANSETRON 4 MG/1
4 TABLET, ORALLY DISINTEGRATING ORAL EVERY 8 HOURS PRN
Status: DISCONTINUED | OUTPATIENT
Start: 2025-04-17 | End: 2025-04-21 | Stop reason: HOSPADM

## 2025-04-17 RX ORDER — OXYCODONE HYDROCHLORIDE 5 MG/1
5 TABLET ORAL EVERY 6 HOURS PRN
Status: DISCONTINUED | OUTPATIENT
Start: 2025-04-17 | End: 2025-04-21 | Stop reason: HOSPADM

## 2025-04-17 RX ORDER — METRONIDAZOLE 500 MG/100ML
INJECTION, SOLUTION INTRAVENOUS
Status: DISCONTINUED | OUTPATIENT
Start: 2025-04-17 | End: 2025-04-17 | Stop reason: SDUPTHER

## 2025-04-17 RX ORDER — PROPOFOL 10 MG/ML
INJECTION, EMULSION INTRAVENOUS
Status: DISCONTINUED | OUTPATIENT
Start: 2025-04-17 | End: 2025-04-17 | Stop reason: SDUPTHER

## 2025-04-17 RX ORDER — SODIUM CHLORIDE 0.9 % (FLUSH) 0.9 %
5-40 SYRINGE (ML) INJECTION EVERY 12 HOURS SCHEDULED
Status: DISCONTINUED | OUTPATIENT
Start: 2025-04-17 | End: 2025-04-17 | Stop reason: HOSPADM

## 2025-04-17 RX ORDER — KETOROLAC TROMETHAMINE 30 MG/ML
15 INJECTION, SOLUTION INTRAMUSCULAR; INTRAVENOUS EVERY 6 HOURS
Status: DISCONTINUED | OUTPATIENT
Start: 2025-04-17 | End: 2025-04-21 | Stop reason: HOSPADM

## 2025-04-17 RX ADMIN — METRONIDAZOLE 500 MG: 500 INJECTION, SOLUTION INTRAVENOUS at 09:26

## 2025-04-17 RX ADMIN — Medication 10 MG: at 10:44

## 2025-04-17 RX ADMIN — ROCURONIUM BROMIDE 10 MG: 10 INJECTION, SOLUTION INTRAVENOUS at 13:21

## 2025-04-17 RX ADMIN — SODIUM CHLORIDE, POTASSIUM CHLORIDE, SODIUM LACTATE AND CALCIUM CHLORIDE: 600; 310; 30; 20 INJECTION, SOLUTION INTRAVENOUS at 10:21

## 2025-04-17 RX ADMIN — MAGNESIUM SULFATE HEPTAHYDRATE 2000 MG: 40 INJECTION, SOLUTION INTRAVENOUS at 19:53

## 2025-04-17 RX ADMIN — FENTANYL CITRATE 25 MCG: 50 INJECTION, SOLUTION INTRAMUSCULAR; INTRAVENOUS at 09:49

## 2025-04-17 RX ADMIN — ACETAMINOPHEN 1000 MG: 500 TABLET ORAL at 21:57

## 2025-04-17 RX ADMIN — ROCURONIUM BROMIDE 10 MG: 10 INJECTION, SOLUTION INTRAVENOUS at 10:41

## 2025-04-17 RX ADMIN — SODIUM CHLORIDE, POTASSIUM CHLORIDE, SODIUM LACTATE AND CALCIUM CHLORIDE: 600; 310; 30; 20 INJECTION, SOLUTION INTRAVENOUS at 13:12

## 2025-04-17 RX ADMIN — METHOCARBAMOL 750 MG: 750 TABLET ORAL at 21:57

## 2025-04-17 RX ADMIN — HYDROMORPHONE HYDROCHLORIDE 0.25 MG: 1 INJECTION, SOLUTION INTRAMUSCULAR; INTRAVENOUS; SUBCUTANEOUS at 14:38

## 2025-04-17 RX ADMIN — DEXAMETHASONE SODIUM PHOSPHATE 10 MG: 10 INJECTION INTRAMUSCULAR; INTRAVENOUS at 09:28

## 2025-04-17 RX ADMIN — SODIUM CHLORIDE, SODIUM LACTATE, POTASSIUM CHLORIDE, AND CALCIUM CHLORIDE: .6; .31; .03; .02 INJECTION, SOLUTION INTRAVENOUS at 15:17

## 2025-04-17 RX ADMIN — FENTANYL CITRATE 25 MCG: 50 INJECTION, SOLUTION INTRAMUSCULAR; INTRAVENOUS at 09:46

## 2025-04-17 RX ADMIN — SODIUM CHLORIDE, POTASSIUM CHLORIDE, SODIUM LACTATE AND CALCIUM CHLORIDE: 600; 310; 30; 20 INJECTION, SOLUTION INTRAVENOUS at 11:42

## 2025-04-17 RX ADMIN — HYDROMORPHONE HYDROCHLORIDE 0.5 MG: 1 INJECTION, SOLUTION INTRAMUSCULAR; INTRAVENOUS; SUBCUTANEOUS at 14:17

## 2025-04-17 RX ADMIN — HYDROMORPHONE HYDROCHLORIDE 0.5 MG: 1 INJECTION, SOLUTION INTRAMUSCULAR; INTRAVENOUS; SUBCUTANEOUS at 14:15

## 2025-04-17 RX ADMIN — MIDAZOLAM 2 MG: 1 INJECTION INTRAMUSCULAR; INTRAVENOUS at 09:15

## 2025-04-17 RX ADMIN — SODIUM CHLORIDE, POTASSIUM CHLORIDE, SODIUM LACTATE AND CALCIUM CHLORIDE: 600; 310; 30; 20 INJECTION, SOLUTION INTRAVENOUS at 08:58

## 2025-04-17 RX ADMIN — CEFAZOLIN 2 G: 1 INJECTION, POWDER, FOR SOLUTION INTRAMUSCULAR; INTRAVENOUS at 09:25

## 2025-04-17 RX ADMIN — FENTANYL CITRATE 50 MCG: 50 INJECTION, SOLUTION INTRAMUSCULAR; INTRAVENOUS at 09:19

## 2025-04-17 RX ADMIN — HYDROMORPHONE HYDROCHLORIDE 0.25 MG: 1 INJECTION, SOLUTION INTRAMUSCULAR; INTRAVENOUS; SUBCUTANEOUS at 13:43

## 2025-04-17 RX ADMIN — HYDROMORPHONE HYDROCHLORIDE 0.25 MG: 1 INJECTION, SOLUTION INTRAMUSCULAR; INTRAVENOUS; SUBCUTANEOUS at 13:51

## 2025-04-17 RX ADMIN — BUPIVACAINE HYDROCHLORIDE 60 ML: 2.5 INJECTION, SOLUTION EPIDURAL; INFILTRATION; INTRACAUDAL; PERINEURAL at 14:10

## 2025-04-17 RX ADMIN — LIDOCAINE HYDROCHLORIDE 50 MG: 10 INJECTION, SOLUTION EPIDURAL; INFILTRATION; INTRACAUDAL; PERINEURAL at 09:19

## 2025-04-17 RX ADMIN — HYDROMORPHONE HYDROCHLORIDE 0.25 MG: 1 INJECTION, SOLUTION INTRAMUSCULAR; INTRAVENOUS; SUBCUTANEOUS at 15:09

## 2025-04-17 RX ADMIN — ROCURONIUM BROMIDE 50 MG: 10 INJECTION, SOLUTION INTRAVENOUS at 09:19

## 2025-04-17 RX ADMIN — METRONIDAZOLE 500 MG: 500 INJECTION, SOLUTION INTRAVENOUS at 17:46

## 2025-04-17 RX ADMIN — ROCURONIUM BROMIDE 10 MG: 10 INJECTION, SOLUTION INTRAVENOUS at 11:20

## 2025-04-17 RX ADMIN — Medication 20 MG: at 09:56

## 2025-04-17 RX ADMIN — HYDROMORPHONE HYDROCHLORIDE 0.5 MG: 1 INJECTION, SOLUTION INTRAMUSCULAR; INTRAVENOUS; SUBCUTANEOUS at 17:00

## 2025-04-17 RX ADMIN — DIPHENHYDRAMINE HYDROCHLORIDE 12.5 MG: 50 INJECTION INTRAMUSCULAR; INTRAVENOUS at 09:28

## 2025-04-17 RX ADMIN — CEFAZOLIN 2 G: 1 INJECTION, POWDER, FOR SOLUTION INTRAMUSCULAR; INTRAVENOUS at 13:20

## 2025-04-17 RX ADMIN — HYDROMORPHONE HYDROCHLORIDE 0.5 MG: 1 INJECTION, SOLUTION INTRAMUSCULAR; INTRAVENOUS; SUBCUTANEOUS at 12:19

## 2025-04-17 RX ADMIN — HYDROMORPHONE HYDROCHLORIDE 0.25 MG: 1 INJECTION, SOLUTION INTRAMUSCULAR; INTRAVENOUS; SUBCUTANEOUS at 14:47

## 2025-04-17 RX ADMIN — FENTANYL CITRATE 50 MCG: 50 INJECTION, SOLUTION INTRAMUSCULAR; INTRAVENOUS at 11:36

## 2025-04-17 RX ADMIN — ROCURONIUM BROMIDE 10 MG: 10 INJECTION, SOLUTION INTRAVENOUS at 11:52

## 2025-04-17 RX ADMIN — PROPOFOL 160 MG: 10 INJECTION, EMULSION INTRAVENOUS at 09:19

## 2025-04-17 RX ADMIN — ROCURONIUM BROMIDE 10 MG: 10 INJECTION, SOLUTION INTRAVENOUS at 10:22

## 2025-04-17 RX ADMIN — ROCURONIUM BROMIDE 10 MG: 10 INJECTION, SOLUTION INTRAVENOUS at 11:36

## 2025-04-17 RX ADMIN — KETOROLAC TROMETHAMINE 15 MG: 30 INJECTION, SOLUTION INTRAMUSCULAR; INTRAVENOUS at 19:45

## 2025-04-17 RX ADMIN — ROCURONIUM BROMIDE 10 MG: 10 INJECTION, SOLUTION INTRAVENOUS at 12:20

## 2025-04-17 RX ADMIN — OXYCODONE 5 MG: 5 TABLET ORAL at 21:57

## 2025-04-17 RX ADMIN — Medication 20 MG: at 09:43

## 2025-04-17 RX ADMIN — ROCURONIUM BROMIDE 10 MG: 10 INJECTION, SOLUTION INTRAVENOUS at 12:59

## 2025-04-17 RX ADMIN — ONDANSETRON 4 MG: 2 INJECTION, SOLUTION INTRAMUSCULAR; INTRAVENOUS at 13:40

## 2025-04-17 RX ADMIN — CALCIUM GLUCONATE 2000 MG: 20 INJECTION, SOLUTION INTRAVENOUS at 21:59

## 2025-04-17 RX ADMIN — ROCURONIUM BROMIDE 10 MG: 10 INJECTION, SOLUTION INTRAVENOUS at 12:51

## 2025-04-17 RX ADMIN — HYDROMORPHONE HYDROCHLORIDE 0.5 MG: 1 INJECTION, SOLUTION INTRAMUSCULAR; INTRAVENOUS; SUBCUTANEOUS at 19:46

## 2025-04-17 RX ADMIN — FENTANYL CITRATE 50 MCG: 50 INJECTION, SOLUTION INTRAMUSCULAR; INTRAVENOUS at 11:00

## 2025-04-17 RX ADMIN — ROCURONIUM BROMIDE 10 MG: 10 INJECTION, SOLUTION INTRAVENOUS at 09:56

## 2025-04-17 ASSESSMENT — PAIN DESCRIPTION - PAIN TYPE
TYPE: SURGICAL PAIN
TYPE: SURGICAL PAIN

## 2025-04-17 ASSESSMENT — PAIN DESCRIPTION - ORIENTATION
ORIENTATION: MID
ORIENTATION: RIGHT;LEFT;MID
ORIENTATION: RIGHT;LEFT

## 2025-04-17 ASSESSMENT — PAIN DESCRIPTION - LOCATION
LOCATION: ABDOMEN

## 2025-04-17 ASSESSMENT — PAIN SCALES - GENERAL
PAINLEVEL_OUTOF10: 5
PAINLEVEL_OUTOF10: 6
PAINLEVEL_OUTOF10: 10
PAINLEVEL_OUTOF10: 10
PAINLEVEL_OUTOF10: 8
PAINLEVEL_OUTOF10: 8
PAINLEVEL_OUTOF10: 9
PAINLEVEL_OUTOF10: 10

## 2025-04-17 ASSESSMENT — PAIN DESCRIPTION - DESCRIPTORS
DESCRIPTORS: ACHING;DISCOMFORT
DESCRIPTORS: ACHING;SORE
DESCRIPTORS: TENDER
DESCRIPTORS: SORE
DESCRIPTORS: TENDER

## 2025-04-17 ASSESSMENT — PAIN - FUNCTIONAL ASSESSMENT: PAIN_FUNCTIONAL_ASSESSMENT: 0-10

## 2025-04-17 ASSESSMENT — PAIN DESCRIPTION - FREQUENCY: FREQUENCY: CONTINUOUS

## 2025-04-17 NOTE — PLAN OF CARE
Problem: Discharge Planning  Goal: Discharge to home or other facility with appropriate resources  4/17/2025 1918 by Lee Gomez RN  Outcome: Progressing  4/17/2025 1801 by Cyril Brumfield RN  Outcome: Progressing  Flowsheets (Taken 4/17/2025 1630)  Discharge to home or other facility with appropriate resources: Identify barriers to discharge with patient and caregiver     Problem: Pain  Goal: Verbalizes/displays adequate comfort level or baseline comfort level  4/17/2025 1918 by Lee Gomez RN  Outcome: Progressing  4/17/2025 1801 by Cyril Brumfield RN  Outcome: Progressing  Flowsheets (Taken 4/17/2025 1635 by Conrad Faye RN)  Verbalizes/displays adequate comfort level or baseline comfort level: Encourage patient to monitor pain and request assistance     Problem: Safety - Adult  Goal: Free from fall injury  4/17/2025 1918 by Lee Gomez RN  Outcome: Progressing  4/17/2025 1801 by Cyril Brumfield RN  Outcome: Progressing  Flowsheets (Taken 4/17/2025 1707)  Free From Fall Injury: Instruct family/caregiver on patient safety     Problem: ABCDS Injury Assessment  Goal: Absence of physical injury  4/17/2025 1918 by Lee Gomez RN  Outcome: Progressing  4/17/2025 1801 by Cyril Brumfield RN  Outcome: Progressing  Flowsheets (Taken 4/17/2025 1707)  Absence of Physical Injury: Implement safety measures based on patient assessment

## 2025-04-17 NOTE — PLAN OF CARE
Problem: Discharge Planning  Goal: Discharge to home or other facility with appropriate resources  Outcome: Progressing     Problem: Pain  Goal: Verbalizes/displays adequate comfort level or baseline comfort level  Outcome: Progressing  Flowsheets (Taken 4/17/2025 1635 by Conrad Faye RN)  Verbalizes/displays adequate comfort level or baseline comfort level: Encourage patient to monitor pain and request assistance     Problem: Safety - Adult  Goal: Free from fall injury  Outcome: Progressing  Flowsheets (Taken 4/17/2025 1707)  Free From Fall Injury: Instruct family/caregiver on patient safety     Problem: ABCDS Injury Assessment  Goal: Absence of physical injury  Outcome: Progressing  Flowsheets (Taken 4/17/2025 1707)  Absence of Physical Injury: Implement safety measures based on patient assessment

## 2025-04-17 NOTE — ANESTHESIA PROCEDURE NOTES
Peripheral Block    Patient location during procedure: OR  Reason for block: post-op pain management and at surgeon's request  Start time: 4/17/2025 2:10 PM  End time: 4/17/2025 2:12 PM  Staffing  Performed: anesthesiologist   Anesthesiologist: Karin Snowden MD  Performed by: Karin Snowden MD  Authorized by: Karin Snowden MD    Preanesthetic Checklist  Completed: patient identified, IV checked, site marked, risks and benefits discussed, surgical/procedural consents, equipment checked, pre-op evaluation, timeout performed, anesthesia consent given, oxygen available, monitors applied/VS acknowledged, fire risk safety assessment completed and verbalized and blood product R/B/A discussed and consented  Peripheral Block   Patient position: supine  Prep: ChloraPrep  Provider prep: mask and sterile gloves  Patient monitoring: cardiac monitor, continuous pulse ox, continuous capnometry, frequent blood pressure checks, IV access, oxygen and responsive to questions  Block type: TAP  Laterality: bilateral  Injection technique: single-shot  Guidance: ultrasound guided    Needle   Needle type: insulated echogenic nerve stimulator needle   Needle gauge: 20 G  Needle localization: ultrasound guidance  Needle length: 10 cm  Assessment   Injection assessment: negative aspiration for heme, no paresthesia on injection and no intravascular symptoms  Paresthesia pain: none  Slow fractionated injection: yes  Hemodynamics: stable  Outcomes: uncomplicated and patient tolerated procedure well    Medications Administered  BUPivacaine (MARCAINE) PF injection 0.25% - Perineural   60 mL - 4/17/2025 2:10:00 PM

## 2025-04-17 NOTE — ANESTHESIA PRE PROCEDURE
Department of Anesthesiology  Preprocedure Note       Name:  Eunice Ortega   Age:  49 y.o.  :  1975                                          MRN:  [id number]         Date:  2025      Surgeon: Surgeon(s):  Irwin Bryant MD    Procedure: Procedure(s):  OPEN COLOSTOMY REVERSAL, , POSSIBLE LOOP ILEOSTOMY  FLEXIBLE SIGMOIDOSCOPY- GI SCHEDULED    Medications prior to admission:   Prior to Admission medications    Medication Sig Start Date End Date Taking? Authorizing Provider   senna (SENOKOT) 8.6 MG tablet Take 1 tablet by mouth daily   Yes Provider, MD César   acetaminophen (TYLENOL) 500 MG tablet Take 2 tablets by mouth every 6 hours as needed for Pain 24  Yes Clay Perea MD       Current medications:    Current Facility-Administered Medications   Medication Dose Route Frequency Provider Last Rate Last Admin    lactated ringers infusion   IntraVENous Continuous João Peres MD           Allergies:  No Known Allergies    Problem List:    Patient Active Problem List   Diagnosis Code    Acute diverticulitis K57.92    Acute cystitis without hematuria N30.00    Anemia D64.9    Hypotension I95.9    Hypokalemia E87.6    Sigmoid diverticulitis K57.32    Diverticulitis K57.92       Past Medical History:        Diagnosis Date    Ascites 10/15/2024    Diverticulitis 10/02/2024    Kidney stone 2024    Mitral valve prolapse     Perforated diverticulum 10/04/2024    Pleural effusion 10/15/2024    Purulent peritonitis (HCC) 10/04/2024    Small bowel obstruction (HCC) 10/02/2024    Under care of service provider     pcp-Gloria Gusman-last visit 2024    Under care of service provider     gi-michael-last visit 2025       Past Surgical History:        Procedure Laterality Date    ABDOMINAL EXPLORATION SURGERY  10/04/2024    APAROTOMY EXPLORATORY, SIGMOIDECTOMY, APPENDCTOMY, END COLOSTOMY    APPENDECTOMY  10/04/2024    COLOSTOMY  10/04/2024    LAPAROTOMY N/A 10/04/2024    LAPAROTOMY

## 2025-04-17 NOTE — BRIEF OP NOTE
Brief Postoperative Note      Patient: Eunice Ortega  YOB: 1975  MRN: [id number]    Date of Procedure: 4/17/2025    Pre-Op Diagnosis Codes:      * Diverticulitis [K57.92]    Post-Op Diagnosis:  presence of colostomy       Procedure(s):  OPEN COLOSTOMY REVERSAL, APPENDECTOMY, LYSIS OF ADHESIONS  FLEXIBLE SIGMOIDOSCOPY- GI SCHEDULED    Surgeon(s):  Irwin Bryant MD    Assistant:  Resident: Garima Jackson DO    Anesthesia: General    Estimated Blood Loss (mL): 50 cc    Complications: None    Specimens:   ID Type Source Tests Collected by Time Destination   A : Appendiceal Remnant Tissue Appendix SURGICAL PATHOLOGY Irwin Bryant MD 4/17/2025 1031    B : Colostomy Tissue Colon SURGICAL PATHOLOGY Irwin Bryant MD 4/17/2025 1058    C : Anastomotic Rings Tissue Colon SURGICAL PATHOLOGY Irwin Bryant MD 4/17/2025 1233        Implants:  * No implants in log *      Drains:   NG/OG/NJ/NE Tube Nasogastric 18 fr Right nostril (Active)       Urinary Catheter 04/17/25 2 Way (Active)       [REMOVED] Colostomy LLQ Descending/sigmoid (Removed)       Findings:  Infection Present At Time Of Surgery (PATOS) (choose all levels that have infection present):  No infection present  Other Findings: wound class II, lysis of adhesions >120 minutes, residual appendix w/ staple line adhered to rectal stump, appendectomy performed, mobilization splenic flexure, tension free anastomosis w/ 25 mm EEA stapler, 2 complete anastomotic rings, negative leak test, flexible sigmoidoscopy    Electronically signed by Garima Jackson DO on 4/17/2025 at 2:12 PM

## 2025-04-17 NOTE — PROGRESS NOTES
POST OP NOTE    SUBJECTIVE  Pt s/p open colostomy reversal with appendectomy and lysis of adhesions with flexible sigmoidoscopy. Patient is doing well postoperatively. Pain is well controlled. Patient is having good urine output, but no flatus at this time. NG tube had the blue end tied in a knot which was untied and unclogged.     OBJECTIVE  VITALS:  BP (!) 165/89   Pulse 73   Temp 97.7 °F (36.5 °C) (Oral)   Resp (!) 7   Ht 1.575 m (5' 2\")   Wt 72.1 kg (159 lb)   SpO2 100%   BMI 29.08 kg/m²         GENERAL:  awake and alert.  No acute distress  CARDIOVASCULAR:  regular rate and rhythm   LUNGS:  CTA Bilaterally  ABDOMEN:  appropriate tenderness to palpation. Abdomen soft, non-distended  INCISION: Incision clean/dry/intact    ASSESSMENT  1. POD# 0 s/p pen colostomy reversal with appendectomy and lysis of adhesions with flexible sigmoidoscopy    PLAN  Pain and nausea control prn  Monitor abdominal exam  Monitor NG output. Blue end should not be tied off or blocked off with any foreign object.  Please reach out with any questions or concerns.    Tc Kearns DO  Trauma/Surgery Service  4/17/2025 at 7:39 PM

## 2025-04-17 NOTE — INTERVAL H&P NOTE
Update History & Physical    The patient's History and Physical of April 17, 2025 was reviewed with the patient and I examined the patient. There was no change. The surgical site was confirmed by the patient and me.     Plan: The risks, benefits, expected outcome, and alternative to the recommended procedure have been discussed with the patient. Patient understands and wants to proceed with the procedure.     Electronically signed by Garima Jackson DO on 4/17/2025 at 8:46 AM

## 2025-04-17 NOTE — ANESTHESIA POSTPROCEDURE EVALUATION
Department of Anesthesiology  Postprocedure Note    Patient: Eunice Ortega  MRN: [id number]  YOB: 1975  Date of evaluation: 4/17/2025    Procedure Summary       Date: 04/17/25 Room / Location: [address]    Anesthesia Start: 0914 Anesthesia Stop: 1426    Procedures:       OPEN COLOSTOMY REVERSAL, APPENDECTOMY, LYSIS OF ADHESIONS      FLEXIBLE SIGMOIDOSCOPY- GI SCHEDULED Diagnosis:       Diverticulitis      (Diverticulitis [K57.92])    Surgeons: Irwin Bryant MD Responsible Provider: Karin Snowden MD    Anesthesia Type: general ASA Status: 2            Anesthesia Type: No value filed.    Gaby Phase I: Gaby Score: 10    Gaby Phase II:      Anesthesia Post Evaluation    Patient location during evaluation: PACU  Patient participation: complete - patient participated  Level of consciousness: awake  Airway patency: patent  Nausea & Vomiting: no nausea and no vomiting  Cardiovascular status: blood pressure returned to baseline  Respiratory status: acceptable  Hydration status: euvolemic  Pain management: adequate    No notable events documented.

## 2025-04-18 LAB
ANION GAP SERPL CALCULATED.3IONS-SCNC: 9 MMOL/L (ref 9–16)
BASOPHILS # BLD: 0.03 K/UL (ref 0–0.2)
BASOPHILS NFR BLD: 0 % (ref 0–2)
BUN SERPL-MCNC: 9 MG/DL (ref 6–20)
CALCIUM SERPL-MCNC: 8.9 MG/DL (ref 8.6–10.4)
CHLORIDE SERPL-SCNC: 105 MMOL/L (ref 98–107)
CO2 SERPL-SCNC: 23 MMOL/L (ref 20–31)
CREAT SERPL-MCNC: 0.7 MG/DL (ref 0.6–0.9)
EOSINOPHIL # BLD: <0.03 K/UL (ref 0–0.44)
EOSINOPHILS RELATIVE PERCENT: 0 % (ref 1–4)
ERYTHROCYTE [DISTWIDTH] IN BLOOD BY AUTOMATED COUNT: 14.9 % (ref 11.8–14.4)
GFR, ESTIMATED: >90 ML/MIN/1.73M2
GLUCOSE BLD-MCNC: 92 MG/DL (ref 65–105)
GLUCOSE SERPL-MCNC: 98 MG/DL (ref 74–99)
HCT VFR BLD AUTO: 32.4 % (ref 36.3–47.1)
HGB BLD-MCNC: 10.3 G/DL (ref 11.9–15.1)
IMM GRANULOCYTES # BLD AUTO: 0.04 K/UL (ref 0–0.3)
IMM GRANULOCYTES NFR BLD: 0 %
LYMPHOCYTES NFR BLD: 1.78 K/UL (ref 1.1–3.7)
LYMPHOCYTES RELATIVE PERCENT: 16 % (ref 24–43)
MCH RBC QN AUTO: 28.4 PG (ref 25.2–33.5)
MCHC RBC AUTO-ENTMCNC: 31.8 G/DL (ref 28.4–34.8)
MCV RBC AUTO: 89.3 FL (ref 82.6–102.9)
MONOCYTES NFR BLD: 0.83 K/UL (ref 0.1–1.2)
MONOCYTES NFR BLD: 8 % (ref 3–12)
NEUTROPHILS NFR BLD: 76 % (ref 36–65)
NEUTS SEG NFR BLD: 8.32 K/UL (ref 1.5–8.1)
NRBC BLD-RTO: 0 PER 100 WBC
PLATELET # BLD AUTO: 363 K/UL (ref 138–453)
PMV BLD AUTO: 9.2 FL (ref 8.1–13.5)
POTASSIUM SERPL-SCNC: 4.4 MMOL/L (ref 3.7–5.3)
RBC # BLD AUTO: 3.63 M/UL (ref 3.95–5.11)
RBC # BLD: ABNORMAL 10*6/UL
SODIUM SERPL-SCNC: 137 MMOL/L (ref 136–145)
WBC OTHER # BLD: 11 K/UL (ref 3.5–11.3)

## 2025-04-18 PROCEDURE — 80048 BASIC METABOLIC PNL TOTAL CA: CPT

## 2025-04-18 PROCEDURE — 99024 POSTOP FOLLOW-UP VISIT: CPT

## 2025-04-18 PROCEDURE — 6370000000 HC RX 637 (ALT 250 FOR IP)

## 2025-04-18 PROCEDURE — 82947 ASSAY GLUCOSE BLOOD QUANT: CPT

## 2025-04-18 PROCEDURE — 36415 COLL VENOUS BLD VENIPUNCTURE: CPT

## 2025-04-18 PROCEDURE — 6360000002 HC RX W HCPCS

## 2025-04-18 PROCEDURE — 2580000003 HC RX 258

## 2025-04-18 PROCEDURE — 99024 POSTOP FOLLOW-UP VISIT: CPT | Performed by: SURGERY

## 2025-04-18 PROCEDURE — 2060000000 HC ICU INTERMEDIATE R&B

## 2025-04-18 PROCEDURE — 85025 COMPLETE CBC W/AUTO DIFF WBC: CPT

## 2025-04-18 RX ADMIN — METHOCARBAMOL 750 MG: 750 TABLET ORAL at 08:24

## 2025-04-18 RX ADMIN — KETOROLAC TROMETHAMINE 15 MG: 30 INJECTION, SOLUTION INTRAMUSCULAR; INTRAVENOUS at 19:00

## 2025-04-18 RX ADMIN — GABAPENTIN 300 MG: 300 CAPSULE ORAL at 01:04

## 2025-04-18 RX ADMIN — ENOXAPARIN SODIUM 40 MG: 100 INJECTION SUBCUTANEOUS at 08:04

## 2025-04-18 RX ADMIN — SODIUM CHLORIDE, SODIUM LACTATE, POTASSIUM CHLORIDE, AND CALCIUM CHLORIDE: .6; .31; .03; .02 INJECTION, SOLUTION INTRAVENOUS at 08:36

## 2025-04-18 RX ADMIN — HYDROMORPHONE HYDROCHLORIDE 0.5 MG: 1 INJECTION, SOLUTION INTRAMUSCULAR; INTRAVENOUS; SUBCUTANEOUS at 01:04

## 2025-04-18 RX ADMIN — FAMOTIDINE 20 MG: 10 INJECTION, SOLUTION INTRAVENOUS at 02:19

## 2025-04-18 RX ADMIN — KETOROLAC TROMETHAMINE 15 MG: 30 INJECTION, SOLUTION INTRAMUSCULAR; INTRAVENOUS at 12:32

## 2025-04-18 RX ADMIN — FAMOTIDINE 20 MG: 10 INJECTION, SOLUTION INTRAVENOUS at 08:05

## 2025-04-18 RX ADMIN — GABAPENTIN 300 MG: 300 CAPSULE ORAL at 08:24

## 2025-04-18 RX ADMIN — GABAPENTIN 300 MG: 300 CAPSULE ORAL at 16:55

## 2025-04-18 RX ADMIN — METHOCARBAMOL 750 MG: 750 TABLET ORAL at 20:37

## 2025-04-18 RX ADMIN — METHOCARBAMOL 750 MG: 750 TABLET ORAL at 01:04

## 2025-04-18 RX ADMIN — KETOROLAC TROMETHAMINE 15 MG: 30 INJECTION, SOLUTION INTRAMUSCULAR; INTRAVENOUS at 01:04

## 2025-04-18 RX ADMIN — METRONIDAZOLE 500 MG: 500 INJECTION, SOLUTION INTRAVENOUS at 08:39

## 2025-04-18 RX ADMIN — SODIUM CHLORIDE, SODIUM LACTATE, POTASSIUM CHLORIDE, AND CALCIUM CHLORIDE: .6; .31; .03; .02 INJECTION, SOLUTION INTRAVENOUS at 17:29

## 2025-04-18 RX ADMIN — METRONIDAZOLE 500 MG: 500 INJECTION, SOLUTION INTRAVENOUS at 01:03

## 2025-04-18 RX ADMIN — KETOROLAC TROMETHAMINE 15 MG: 30 INJECTION, SOLUTION INTRAMUSCULAR; INTRAVENOUS at 08:04

## 2025-04-18 RX ADMIN — POTASSIUM BICARBONATE 40 MEQ: 782 TABLET, EFFERVESCENT ORAL at 02:20

## 2025-04-18 RX ADMIN — OXYCODONE 5 MG: 5 TABLET ORAL at 20:37

## 2025-04-18 RX ADMIN — ACETAMINOPHEN 1000 MG: 500 TABLET ORAL at 12:31

## 2025-04-18 RX ADMIN — ACETAMINOPHEN 1000 MG: 500 TABLET ORAL at 20:37

## 2025-04-18 RX ADMIN — FAMOTIDINE 20 MG: 10 INJECTION, SOLUTION INTRAVENOUS at 20:37

## 2025-04-18 RX ADMIN — OXYCODONE 5 MG: 5 TABLET ORAL at 14:04

## 2025-04-18 ASSESSMENT — PAIN DESCRIPTION - ORIENTATION
ORIENTATION: MID
ORIENTATION: MID
ORIENTATION: MID;LOWER
ORIENTATION: MID
ORIENTATION: LEFT;RIGHT

## 2025-04-18 ASSESSMENT — PAIN DESCRIPTION - DESCRIPTORS
DESCRIPTORS: STABBING
DESCRIPTORS: SORE
DESCRIPTORS: SORE;PRESSURE
DESCRIPTORS: SORE
DESCRIPTORS: TENDER;SORE

## 2025-04-18 ASSESSMENT — PAIN SCALES - GENERAL
PAINLEVEL_OUTOF10: 8
PAINLEVEL_OUTOF10: 7
PAINLEVEL_OUTOF10: 8
PAINLEVEL_OUTOF10: 10
PAINLEVEL_OUTOF10: 10
PAINLEVEL_OUTOF10: 8

## 2025-04-18 ASSESSMENT — PAIN DESCRIPTION - LOCATION
LOCATION: ABDOMEN

## 2025-04-18 NOTE — PLAN OF CARE
Problem: Discharge Planning  Goal: Discharge to home or other facility with appropriate resources  Outcome: Progressing  Flowsheets  Taken 4/18/2025 1200  Discharge to home or other facility with appropriate resources: Identify barriers to discharge with patient and caregiver  Taken 4/18/2025 0800  Discharge to home or other facility with appropriate resources: Identify barriers to discharge with patient and caregiver     Problem: Pain  Goal: Verbalizes/displays adequate comfort level or baseline comfort level  Outcome: Progressing  Flowsheets  Taken 4/18/2025 1228  Verbalizes/displays adequate comfort level or baseline comfort level: Encourage patient to monitor pain and request assistance  Taken 4/18/2025 0804  Verbalizes/displays adequate comfort level or baseline comfort level: Encourage patient to monitor pain and request assistance     Problem: Safety - Adult  Goal: Free from fall injury  Outcome: Progressing  Flowsheets (Taken 4/18/2025 0800)  Free From Fall Injury: Instruct family/caregiver on patient safety     Problem: ABCDS Injury Assessment  Goal: Absence of physical injury  Outcome: Progressing  Flowsheets (Taken 4/18/2025 0800)  Absence of Physical Injury: Implement safety measures based on patient assessment

## 2025-04-18 NOTE — OP NOTE
Operative Note      Patient: Eunice Ortega  YOB: 1975  MRN: [id number]    Date of Procedure: 4/17/2025    Pre-Op Diagnosis Codes:      * Diverticulitis [K57.92]    Post-Op Diagnosis:  presence of stoma       Procedure(s):  OPEN COLOSTOMY REVERSAL, APPENDECTOMY, LYSIS OF ADHESIONS  FLEXIBLE SIGMOIDOSCOPY- GI SCHEDULED    Surgeon(s):  Irwin Bryant MD    Assistant:   Resident: Garima Jackson DO    Anesthesia: General    Estimated Blood Loss (mL): 50 cc    Complications: None    Specimens:   ID Type Source Tests Collected by Time Destination   A : Appendiceal Remnant Tissue Appendix SURGICAL PATHOLOGY Irwin Bryant MD 4/17/2025 1031    B : Colostomy Tissue Colon SURGICAL PATHOLOGY Irwin Bryant MD 4/17/2025 1058    C : Anastomotic Rings Tissue Colon SURGICAL PATHOLOGY Irwin Bryant MD 4/17/2025 1233        Implants:  * No implants in log *      Drains:   NG/OG/NJ/NE Tube Nasogastric 18 fr Right nostril (Active)   Surrounding Skin Clean, dry & intact 04/18/25 0000   Securement device Tape 04/18/25 0000   Status Suction-low intermittent 04/18/25 0000   Placement Verified External Catheter Length;X-Ray (Initial) 04/18/25 0000   NG/OG/NJ/NE External Measurement (cm) 56 cm 04/18/25 0000   Drainage Appearance Clear;Serosanguinous 04/18/25 0000   Output (mL) 10 ml 04/17/25 1626   Residual Volume (ml) 5 ml 04/17/25 1630       [REMOVED] Colostomy LLQ Descending/sigmoid (Removed)       [REMOVED] Urinary Catheter 04/17/25 2 Way (Removed)   Catheter Indications Need for fluid volume management of the critically ill patient in a critical care setting 04/17/25 1515   Site Assessment No urethral drainage 04/17/25 1515   Urine Color Yellow 04/17/25 1515   Urine Appearance Clear 04/17/25 1515   Collection Container Standard 04/17/25 1515   Securement Method Securing device (Describe) 04/17/25 1515   Catheter Best Practices  Drainage tube clipped to bed;Tamper seal intact

## 2025-04-18 NOTE — CARE COORDINATION
Case Management Assessment  Initial Evaluation    Date/Time of Evaluation: 4/18/2025 1:11 PM  Assessment Completed by: Gaston Montero    If patient is discharged prior to next notation, then this note serves as note for discharge by case management.    Patient Name: Eunice Ortega                   YOB: 1975  Diagnosis: Diverticulitis [K57.92]  S/P colostomy takedown [Z98.890]                   Date / Time: 4/17/2025  7:07 AM    Patient Admission Status: Inpatient   Readmission Risk (Low < 19, Mod (19-27), High > 27): Readmission Risk Score: 7.6    Current PCP: Gloria Gusman APRN - CNP  PCP verified by CM? (P) Yes    Chart Reviewed: Yes      History Provided by: (P) Patient  Patient Orientation: (P) Alert and Oriented    Patient Cognition: (P) Alert    Hospitalization in the last 30 days (Readmission):  No    If yes, Readmission Assessment in CM Navigator will be completed.    Advance Directives:      Code Status: Full Code   Patient's Primary Decision Maker is: (P) Legal Next of Kin      Discharge Planning:    Patient lives with: (P) Alone Type of Home: (P) House  Primary Care Giver: (P) Self  Patient Support Systems include: (P) Children   Current Financial resources: (P) Medicaid  Current community resources: (P) None  Current services prior to admission: (P) None            Current DME:              Type of Home Care services:  None    ADLS  Prior functional level: (P) Independent in ADLs/IADLs  Current functional level: (P) Independent in ADLs/IADLs    PT AM-PAC:   /24  OT AM-PAC:   /24    Family can provide assistance at DC: (P) Yes  Would you like Case Management to discuss the discharge plan with any other family members/significant others, and if so, who? (P) No  Plans to Return to Present Housing: (P) Yes  Other Identified Issues/Barriers to RETURNING to current housing: None  Potential Assistance needed at discharge: (P) Home Care            Potential DME:    Patient expects to

## 2025-04-19 LAB
ANION GAP SERPL CALCULATED.3IONS-SCNC: 8 MMOL/L (ref 9–16)
BASOPHILS # BLD: 0.05 K/UL (ref 0–0.2)
BASOPHILS NFR BLD: 1 % (ref 0–2)
BUN SERPL-MCNC: 7 MG/DL (ref 6–20)
CALCIUM SERPL-MCNC: 8.4 MG/DL (ref 8.6–10.4)
CHLORIDE SERPL-SCNC: 102 MMOL/L (ref 98–107)
CO2 SERPL-SCNC: 23 MMOL/L (ref 20–31)
CREAT SERPL-MCNC: 0.6 MG/DL (ref 0.6–0.9)
EOSINOPHIL # BLD: 0.11 K/UL (ref 0–0.44)
EOSINOPHILS RELATIVE PERCENT: 1 % (ref 1–4)
ERYTHROCYTE [DISTWIDTH] IN BLOOD BY AUTOMATED COUNT: 15.2 % (ref 11.8–14.4)
GFR, ESTIMATED: >90 ML/MIN/1.73M2
GLUCOSE SERPL-MCNC: 80 MG/DL (ref 74–99)
HCT VFR BLD AUTO: 27.4 % (ref 36.3–47.1)
HGB BLD-MCNC: 8.6 G/DL (ref 11.9–15.1)
IMM GRANULOCYTES # BLD AUTO: 0.04 K/UL (ref 0–0.3)
IMM GRANULOCYTES NFR BLD: 0 %
LYMPHOCYTES NFR BLD: 1.78 K/UL (ref 1.1–3.7)
LYMPHOCYTES RELATIVE PERCENT: 19 % (ref 24–43)
MCH RBC QN AUTO: 28.3 PG (ref 25.2–33.5)
MCHC RBC AUTO-ENTMCNC: 31.4 G/DL (ref 28.4–34.8)
MCV RBC AUTO: 90.1 FL (ref 82.6–102.9)
MONOCYTES NFR BLD: 0.72 K/UL (ref 0.1–1.2)
MONOCYTES NFR BLD: 8 % (ref 3–12)
NEUTROPHILS NFR BLD: 71 % (ref 36–65)
NEUTS SEG NFR BLD: 6.55 K/UL (ref 1.5–8.1)
NRBC BLD-RTO: 0 PER 100 WBC
PLATELET # BLD AUTO: 305 K/UL (ref 138–453)
PMV BLD AUTO: 9.4 FL (ref 8.1–13.5)
POTASSIUM SERPL-SCNC: 3.6 MMOL/L (ref 3.7–5.3)
RBC # BLD AUTO: 3.04 M/UL (ref 3.95–5.11)
RBC # BLD: ABNORMAL 10*6/UL
SODIUM SERPL-SCNC: 133 MMOL/L (ref 136–145)
WBC OTHER # BLD: 9.3 K/UL (ref 3.5–11.3)

## 2025-04-19 PROCEDURE — 2060000000 HC ICU INTERMEDIATE R&B

## 2025-04-19 PROCEDURE — 2580000003 HC RX 258

## 2025-04-19 PROCEDURE — 6370000000 HC RX 637 (ALT 250 FOR IP)

## 2025-04-19 PROCEDURE — 6360000002 HC RX W HCPCS

## 2025-04-19 PROCEDURE — 36415 COLL VENOUS BLD VENIPUNCTURE: CPT

## 2025-04-19 PROCEDURE — 99024 POSTOP FOLLOW-UP VISIT: CPT | Performed by: SURGERY

## 2025-04-19 PROCEDURE — 85025 COMPLETE CBC W/AUTO DIFF WBC: CPT

## 2025-04-19 PROCEDURE — 80048 BASIC METABOLIC PNL TOTAL CA: CPT

## 2025-04-19 RX ADMIN — OXYCODONE 5 MG: 5 TABLET ORAL at 09:50

## 2025-04-19 RX ADMIN — GABAPENTIN 300 MG: 300 CAPSULE ORAL at 07:28

## 2025-04-19 RX ADMIN — METHOCARBAMOL 750 MG: 750 TABLET ORAL at 13:11

## 2025-04-19 RX ADMIN — KETOROLAC TROMETHAMINE 15 MG: 30 INJECTION, SOLUTION INTRAMUSCULAR; INTRAVENOUS at 07:28

## 2025-04-19 RX ADMIN — KETOROLAC TROMETHAMINE 15 MG: 30 INJECTION, SOLUTION INTRAMUSCULAR; INTRAVENOUS at 01:53

## 2025-04-19 RX ADMIN — GABAPENTIN 300 MG: 300 CAPSULE ORAL at 17:00

## 2025-04-19 RX ADMIN — SODIUM CHLORIDE, SODIUM LACTATE, POTASSIUM CHLORIDE, AND CALCIUM CHLORIDE: .6; .31; .03; .02 INJECTION, SOLUTION INTRAVENOUS at 18:56

## 2025-04-19 RX ADMIN — ACETAMINOPHEN 1000 MG: 500 TABLET ORAL at 03:55

## 2025-04-19 RX ADMIN — METHOCARBAMOL 750 MG: 750 TABLET ORAL at 19:52

## 2025-04-19 RX ADMIN — HYDROMORPHONE HYDROCHLORIDE 0.5 MG: 1 INJECTION, SOLUTION INTRAMUSCULAR; INTRAVENOUS; SUBCUTANEOUS at 08:22

## 2025-04-19 RX ADMIN — HYDROMORPHONE HYDROCHLORIDE 0.5 MG: 1 INJECTION, SOLUTION INTRAMUSCULAR; INTRAVENOUS; SUBCUTANEOUS at 19:52

## 2025-04-19 RX ADMIN — ACETAMINOPHEN 1000 MG: 500 TABLET ORAL at 13:11

## 2025-04-19 RX ADMIN — POTASSIUM BICARBONATE 40 MEQ: 782 TABLET, EFFERVESCENT ORAL at 09:51

## 2025-04-19 RX ADMIN — ACETAMINOPHEN 1000 MG: 500 TABLET ORAL at 19:52

## 2025-04-19 RX ADMIN — METHOCARBAMOL 750 MG: 750 TABLET ORAL at 07:28

## 2025-04-19 RX ADMIN — FAMOTIDINE 20 MG: 10 INJECTION, SOLUTION INTRAVENOUS at 07:28

## 2025-04-19 RX ADMIN — KETOROLAC TROMETHAMINE 15 MG: 30 INJECTION, SOLUTION INTRAMUSCULAR; INTRAVENOUS at 13:11

## 2025-04-19 RX ADMIN — HYDROMORPHONE HYDROCHLORIDE 0.5 MG: 1 INJECTION, SOLUTION INTRAMUSCULAR; INTRAVENOUS; SUBCUTANEOUS at 14:26

## 2025-04-19 RX ADMIN — ENOXAPARIN SODIUM 40 MG: 100 INJECTION SUBCUTANEOUS at 07:28

## 2025-04-19 RX ADMIN — GABAPENTIN 300 MG: 300 CAPSULE ORAL at 01:53

## 2025-04-19 RX ADMIN — OXYCODONE 5 MG: 5 TABLET ORAL at 17:02

## 2025-04-19 RX ADMIN — FAMOTIDINE 20 MG: 10 INJECTION, SOLUTION INTRAVENOUS at 19:52

## 2025-04-19 RX ADMIN — KETOROLAC TROMETHAMINE 15 MG: 30 INJECTION, SOLUTION INTRAMUSCULAR; INTRAVENOUS at 19:52

## 2025-04-19 RX ADMIN — METHOCARBAMOL 750 MG: 750 TABLET ORAL at 01:53

## 2025-04-19 ASSESSMENT — PAIN DESCRIPTION - DESCRIPTORS
DESCRIPTORS: CRAMPING;DISCOMFORT;SORE
DESCRIPTORS: CRAMPING;ACHING;DISCOMFORT;SORE
DESCRIPTORS: ACHING;DISCOMFORT
DESCRIPTORS: SHARP;SHOOTING
DESCRIPTORS: ACHING;DISCOMFORT
DESCRIPTORS: ACHING;DISCOMFORT;BURNING;SORE

## 2025-04-19 ASSESSMENT — PAIN SCALES - GENERAL
PAINLEVEL_OUTOF10: 7
PAINLEVEL_OUTOF10: 6
PAINLEVEL_OUTOF10: 9
PAINLEVEL_OUTOF10: 10
PAINLEVEL_OUTOF10: 8
PAINLEVEL_OUTOF10: 4
PAINLEVEL_OUTOF10: 4
PAINLEVEL_OUTOF10: 9
PAINLEVEL_OUTOF10: 5
PAINLEVEL_OUTOF10: 6
PAINLEVEL_OUTOF10: 10
PAINLEVEL_OUTOF10: 8

## 2025-04-19 ASSESSMENT — PAIN DESCRIPTION - ORIENTATION
ORIENTATION: MID
ORIENTATION: MID;LOWER
ORIENTATION: MID

## 2025-04-19 ASSESSMENT — PAIN DESCRIPTION - FREQUENCY
FREQUENCY: CONTINUOUS
FREQUENCY: CONTINUOUS

## 2025-04-19 ASSESSMENT — PAIN DESCRIPTION - LOCATION
LOCATION: ABDOMEN

## 2025-04-19 ASSESSMENT — PAIN DESCRIPTION - ONSET
ONSET: ON-GOING
ONSET: ON-GOING

## 2025-04-19 ASSESSMENT — PAIN - FUNCTIONAL ASSESSMENT
PAIN_FUNCTIONAL_ASSESSMENT: ACTIVITIES ARE NOT PREVENTED
PAIN_FUNCTIONAL_ASSESSMENT: ACTIVITIES ARE NOT PREVENTED

## 2025-04-19 ASSESSMENT — PAIN DESCRIPTION - PAIN TYPE: TYPE: SURGICAL PAIN

## 2025-04-19 NOTE — PLAN OF CARE
Problem: Discharge Planning  Goal: Discharge to home or other facility with appropriate resources  Outcome: Progressing  Flowsheets (Taken 4/19/2025 0800)  Discharge to home or other facility with appropriate resources: Identify barriers to discharge with patient and caregiver     Problem: Pain  Goal: Verbalizes/displays adequate comfort level or baseline comfort level  Outcome: Progressing  Flowsheets (Taken 4/19/2025 1702)  Verbalizes/displays adequate comfort level or baseline comfort level: Encourage patient to monitor pain and request assistance     Problem: Safety - Adult  Goal: Free from fall injury  Outcome: Progressing     Problem: ABCDS Injury Assessment  Goal: Absence of physical injury  Outcome: Progressing

## 2025-04-19 NOTE — PROGRESS NOTES
PROGRESS NOTE          PATIENT NAME: Eunice Ortega  MEDICAL RECORD NO. [id number]  DATE: 2025  SURGEON: Annette   PRIMARY CARE PHYSICIAN: Gloria Gusman APRN - CNP    HD: # 2    ASSESSMENT    Patient Active Problem List   Diagnosis    Acute diverticulitis    Acute cystitis without hematuria    Anemia    Hypotension    Hypokalemia    Sigmoid diverticulitis    Diverticulitis    S/P colostomy takedown     S/p OPEN COLOSTOMY REVERSAL, APPENDECTOMY, LYSIS OF ADHESIONS, FLEXIBLE SIGMOIDOSCOPY  .  POD #2  MEDICAL DECISION MAKING AND PLAN    Pain controlled on MMPT    Patient states that patient continues to pass flatus.  Will advance the diet to clear liquid today  Packing from ostomy site removed during rounds.  Repacked with small amount of iodoform..  Dressing changed during rounds  F/u labs daily   Okay to ambulate and work with PT/OT      Chief Complaint: \"trying to get some rest with sleep mask\"    SUBJECTIVE    Eunice Ortega is doing well this AM.  No acute events overnight.  Patient continues to pass flatus.  Patient's pain is controlled with medication.  Patient was NPO.  No evidence of nausea or vomiting.    OBJECTIVE  VITALS: Temp: Temp: 98.5 °F (36.9 °C)Temp  Av.6 °F (37 °C)  Min: 98 °F (36.7 °C)  Max: 99.5 °F (37.5 °C) BP Systolic (24hrs), Av , Min:93 , Max:135   Diastolic (24hrs), Av, Min:44, Max:85   Pulse Pulse  Av.9  Min: 76  Max: 91 Resp Resp  Avg: 15.9  Min: 11  Max: 25 Pulse ox SpO2  Av.4 %  Min: 94 %  Max: 100 %  GENERAL:  awake and alert.  No acute distress  CARDIOVASCULAR:  regular rate and rhythm   LUNGS:  CTA Bilaterally  ABDOMEN:  appropriate tenderness to palpation. Abdomen soft, non-distended.  INCISION: Incision clean/dry/intact, no strike through on dressing .     I/O last 3 completed shifts:  In: 3969.3 [I.V.:3672.5; IV Piggyback:296.8]  Out: 1350 [Urine:1200; Other:150]    Drain/tube output:  In: 4449.3 [P.O.:480; I.V.:3672.5]  Out: 950

## 2025-04-19 NOTE — PLAN OF CARE
Problem: Discharge Planning  Goal: Discharge to home or other facility with appropriate resources  4/19/2025 0457 by Kay Patton RN  Outcome: Progressing  Flowsheets (Taken 4/18/2025 2000)  Discharge to home or other facility with appropriate resources: Identify barriers to discharge with patient and caregiver  4/18/2025 1812 by Cyril Brumfield RN  Outcome: Progressing  Flowsheets  Taken 4/18/2025 1200  Discharge to home or other facility with appropriate resources: Identify barriers to discharge with patient and caregiver  Taken 4/18/2025 0800  Discharge to home or other facility with appropriate resources: Identify barriers to discharge with patient and caregiver     Problem: Pain  Goal: Verbalizes/displays adequate comfort level or baseline comfort level  4/19/2025 0457 by Kay Patton RN  Outcome: Progressing  Flowsheets  Taken 4/19/2025 0400  Verbalizes/displays adequate comfort level or baseline comfort level: Encourage patient to monitor pain and request assistance  Taken 4/18/2025 2356  Verbalizes/displays adequate comfort level or baseline comfort level: Encourage patient to monitor pain and request assistance  Taken 4/18/2025 2037  Verbalizes/displays adequate comfort level or baseline comfort level: Encourage patient to monitor pain and request assistance  4/18/2025 1812 by Cyril Brumfield RN  Outcome: Progressing  Flowsheets  Taken 4/18/2025 1555  Verbalizes/displays adequate comfort level or baseline comfort level: Encourage patient to monitor pain and request assistance  Taken 4/18/2025 1228  Verbalizes/displays adequate comfort level or baseline comfort level: Encourage patient to monitor pain and request assistance  Taken 4/18/2025 0804  Verbalizes/displays adequate comfort level or baseline comfort level: Encourage patient to monitor pain and request assistance     Problem: Safety - Adult  Goal: Free from fall injury  4/19/2025 0457 by Kay Patton, RN  Outcome:

## 2025-04-20 LAB
ANION GAP SERPL CALCULATED.3IONS-SCNC: 11 MMOL/L (ref 9–16)
BASOPHILS # BLD: 0.04 K/UL (ref 0–0.2)
BASOPHILS NFR BLD: 1 % (ref 0–2)
BUN SERPL-MCNC: 5 MG/DL (ref 6–20)
CALCIUM SERPL-MCNC: 8.5 MG/DL (ref 8.6–10.4)
CHLORIDE SERPL-SCNC: 104 MMOL/L (ref 98–107)
CO2 SERPL-SCNC: 24 MMOL/L (ref 20–31)
CREAT SERPL-MCNC: 0.6 MG/DL (ref 0.6–0.9)
EOSINOPHIL # BLD: 0.29 K/UL (ref 0–0.44)
EOSINOPHILS RELATIVE PERCENT: 4 % (ref 1–4)
ERYTHROCYTE [DISTWIDTH] IN BLOOD BY AUTOMATED COUNT: 15 % (ref 11.8–14.4)
GFR, ESTIMATED: >90 ML/MIN/1.73M2
GLUCOSE SERPL-MCNC: 92 MG/DL (ref 74–99)
HCT VFR BLD AUTO: 28.1 % (ref 36.3–47.1)
HGB BLD-MCNC: 9.1 G/DL (ref 11.9–15.1)
IMM GRANULOCYTES # BLD AUTO: 0.04 K/UL (ref 0–0.3)
IMM GRANULOCYTES NFR BLD: 1 %
LYMPHOCYTES NFR BLD: 2.27 K/UL (ref 1.1–3.7)
LYMPHOCYTES RELATIVE PERCENT: 29 % (ref 24–43)
MCH RBC QN AUTO: 28.3 PG (ref 25.2–33.5)
MCHC RBC AUTO-ENTMCNC: 32.4 G/DL (ref 28.4–34.8)
MCV RBC AUTO: 87.5 FL (ref 82.6–102.9)
MONOCYTES NFR BLD: 0.55 K/UL (ref 0.1–1.2)
MONOCYTES NFR BLD: 7 % (ref 3–12)
NEUTROPHILS NFR BLD: 60 % (ref 36–65)
NEUTS SEG NFR BLD: 4.72 K/UL (ref 1.5–8.1)
NRBC BLD-RTO: 0 PER 100 WBC
PLATELET # BLD AUTO: ABNORMAL K/UL (ref 138–453)
PLATELET, FLUORESCENCE: 316 K/UL (ref 138–453)
PLATELETS.RETICULATED NFR BLD AUTO: 1.1 % (ref 1.1–10.3)
POTASSIUM SERPL-SCNC: 3.8 MMOL/L (ref 3.7–5.3)
RBC # BLD AUTO: 3.21 M/UL (ref 3.95–5.11)
RBC # BLD: ABNORMAL 10*6/UL
SODIUM SERPL-SCNC: 139 MMOL/L (ref 136–145)
WBC OTHER # BLD: 7.9 K/UL (ref 3.5–11.3)

## 2025-04-20 PROCEDURE — 6360000002 HC RX W HCPCS

## 2025-04-20 PROCEDURE — 80048 BASIC METABOLIC PNL TOTAL CA: CPT

## 2025-04-20 PROCEDURE — 85055 RETICULATED PLATELET ASSAY: CPT

## 2025-04-20 PROCEDURE — 6370000000 HC RX 637 (ALT 250 FOR IP)

## 2025-04-20 PROCEDURE — 36415 COLL VENOUS BLD VENIPUNCTURE: CPT

## 2025-04-20 PROCEDURE — 2580000003 HC RX 258

## 2025-04-20 PROCEDURE — 94760 N-INVAS EAR/PLS OXIMETRY 1: CPT

## 2025-04-20 PROCEDURE — 85025 COMPLETE CBC W/AUTO DIFF WBC: CPT

## 2025-04-20 PROCEDURE — 2060000000 HC ICU INTERMEDIATE R&B

## 2025-04-20 PROCEDURE — 99024 POSTOP FOLLOW-UP VISIT: CPT | Performed by: SURGERY

## 2025-04-20 RX ADMIN — ACETAMINOPHEN 1000 MG: 500 TABLET ORAL at 13:06

## 2025-04-20 RX ADMIN — KETOROLAC TROMETHAMINE 15 MG: 30 INJECTION, SOLUTION INTRAMUSCULAR; INTRAVENOUS at 13:06

## 2025-04-20 RX ADMIN — GABAPENTIN 300 MG: 300 CAPSULE ORAL at 09:38

## 2025-04-20 RX ADMIN — METHOCARBAMOL 750 MG: 750 TABLET ORAL at 01:07

## 2025-04-20 RX ADMIN — METHOCARBAMOL 750 MG: 750 TABLET ORAL at 13:06

## 2025-04-20 RX ADMIN — KETOROLAC TROMETHAMINE 15 MG: 30 INJECTION, SOLUTION INTRAMUSCULAR; INTRAVENOUS at 07:44

## 2025-04-20 RX ADMIN — METHOCARBAMOL 750 MG: 750 TABLET ORAL at 20:47

## 2025-04-20 RX ADMIN — GABAPENTIN 300 MG: 300 CAPSULE ORAL at 01:07

## 2025-04-20 RX ADMIN — ACETAMINOPHEN 1000 MG: 500 TABLET ORAL at 03:12

## 2025-04-20 RX ADMIN — ACETAMINOPHEN 1000 MG: 500 TABLET ORAL at 20:46

## 2025-04-20 RX ADMIN — OXYCODONE 5 MG: 5 TABLET ORAL at 07:45

## 2025-04-20 RX ADMIN — HYDROMORPHONE HYDROCHLORIDE 0.5 MG: 1 INJECTION, SOLUTION INTRAMUSCULAR; INTRAVENOUS; SUBCUTANEOUS at 14:08

## 2025-04-20 RX ADMIN — HYDROMORPHONE HYDROCHLORIDE 0.5 MG: 1 INJECTION, SOLUTION INTRAMUSCULAR; INTRAVENOUS; SUBCUTANEOUS at 23:55

## 2025-04-20 RX ADMIN — KETOROLAC TROMETHAMINE 15 MG: 30 INJECTION, SOLUTION INTRAMUSCULAR; INTRAVENOUS at 01:11

## 2025-04-20 RX ADMIN — GABAPENTIN 300 MG: 300 CAPSULE ORAL at 16:58

## 2025-04-20 RX ADMIN — METHOCARBAMOL 750 MG: 750 TABLET ORAL at 09:37

## 2025-04-20 RX ADMIN — KETOROLAC TROMETHAMINE 15 MG: 30 INJECTION, SOLUTION INTRAMUSCULAR; INTRAVENOUS at 20:46

## 2025-04-20 RX ADMIN — ENOXAPARIN SODIUM 40 MG: 100 INJECTION SUBCUTANEOUS at 09:38

## 2025-04-20 RX ADMIN — OXYCODONE 5 MG: 5 TABLET ORAL at 01:07

## 2025-04-20 RX ADMIN — FAMOTIDINE 20 MG: 10 INJECTION, SOLUTION INTRAVENOUS at 20:46

## 2025-04-20 RX ADMIN — HYDROMORPHONE HYDROCHLORIDE 0.5 MG: 1 INJECTION, SOLUTION INTRAMUSCULAR; INTRAVENOUS; SUBCUTANEOUS at 03:12

## 2025-04-20 RX ADMIN — OXYCODONE 5 MG: 5 TABLET ORAL at 17:44

## 2025-04-20 RX ADMIN — FAMOTIDINE 20 MG: 10 INJECTION, SOLUTION INTRAVENOUS at 09:37

## 2025-04-20 RX ADMIN — HYDROMORPHONE HYDROCHLORIDE 0.5 MG: 1 INJECTION, SOLUTION INTRAMUSCULAR; INTRAVENOUS; SUBCUTANEOUS at 19:15

## 2025-04-20 RX ADMIN — HYDROMORPHONE HYDROCHLORIDE 0.5 MG: 1 INJECTION, SOLUTION INTRAMUSCULAR; INTRAVENOUS; SUBCUTANEOUS at 09:37

## 2025-04-20 ASSESSMENT — PAIN SCALES - GENERAL
PAINLEVEL_OUTOF10: 9
PAINLEVEL_OUTOF10: 10
PAINLEVEL_OUTOF10: 6
PAINLEVEL_OUTOF10: 8
PAINLEVEL_OUTOF10: 10
PAINLEVEL_OUTOF10: 10
PAINLEVEL_OUTOF10: 5
PAINLEVEL_OUTOF10: 8
PAINLEVEL_OUTOF10: 3
PAINLEVEL_OUTOF10: 6
PAINLEVEL_OUTOF10: 6
PAINLEVEL_OUTOF10: 10
PAINLEVEL_OUTOF10: 9
PAINLEVEL_OUTOF10: 6
PAINLEVEL_OUTOF10: 6

## 2025-04-20 ASSESSMENT — PAIN DESCRIPTION - DESCRIPTORS
DESCRIPTORS: DISCOMFORT
DESCRIPTORS: ACHING;DISCOMFORT
DESCRIPTORS: SHARP;SHOOTING
DESCRIPTORS: ACHING;SORE
DESCRIPTORS: ACHING;DISCOMFORT
DESCRIPTORS: ACHING;SHARP;STABBING
DESCRIPTORS: THROBBING;DISCOMFORT;ACHING
DESCRIPTORS: ACHING;SORE
DESCRIPTORS: ACHING;DISCOMFORT
DESCRIPTORS: PRESSURE;SHARP;STABBING
DESCRIPTORS: BURNING

## 2025-04-20 ASSESSMENT — PAIN DESCRIPTION - LOCATION
LOCATION: ABDOMEN

## 2025-04-20 ASSESSMENT — PAIN DESCRIPTION - ORIENTATION
ORIENTATION: MID;LOWER

## 2025-04-20 NOTE — PROGRESS NOTES
PROGRESS NOTE          PATIENT NAME: Eunice Ortega  MEDICAL RECORD NO. [id number]  DATE: 2025  SURGEON: Annette   PRIMARY CARE PHYSICIAN: Gloria Gusman APRN - CNP    HD: # 3    ASSESSMENT    Patient Active Problem List   Diagnosis    Acute diverticulitis    Acute cystitis without hematuria    Anemia    Hypotension    Hypokalemia    Sigmoid diverticulitis    Diverticulitis    S/P colostomy takedown     S/p OPEN COLOSTOMY REVERSAL, APPENDECTOMY, LYSIS OF ADHESIONS, FLEXIBLE SIGMOIDOSCOPY  .  POD #3  MEDICAL DECISION MAKING AND PLAN    Pain controlled on MMPT    Continues to pass flatus, advance diet to GI Soft  Packing from ostomy site removed during rounds.  Repacked with  iodoform..  Dressing changed during rounds  F/u labs daily   Okay to ambulate and work with PT/OT  Dispo- plan to dc home if patient tolerates regular diet, possibly tomorrow      Chief Complaint: \"trying to get some rest with sleep mask\"    SUBJECTIVE    Eunice Ortega is doing well this AM.  No acute events overnight.  Patient continues to pass flatus.  Patient's pain is controlled with medication.  Patient was tolerating CLD.  No evidence of nausea or vomiting.    OBJECTIVE  VITALS: Temp: Temp: 98.6 °F (37 °C)Temp  Av.4 °F (36.9 °C)  Min: 98.1 °F (36.7 °C)  Max: 98.6 °F (37 °C) BP Systolic (24hrs), Av , Min:100 , Max:134   Diastolic (24hrs), Av, Min:56, Max:85   Pulse Pulse  Av.3  Min: 75  Max: 88 Resp Resp  Av  Min: 13  Max: 23 Pulse ox SpO2  Av.2 %  Min: 94 %  Max: 99 %  GENERAL:  awake and alert.  No acute distress  CARDIOVASCULAR:  regular rate and rhythm   LUNGS:  CTA Bilaterally  ABDOMEN:  appropriate tenderness to palpation. Abdomen soft, non-distended.  INCISION: Incision clean/dry/intact, no strike through on dressing .     I/O last 3 completed shifts:  In: 5534.3 [P.O.:1565; I.V.:3672.5; IV Piggyback:296.8]  Out: 2200 [Urine:2050; Other:150]    Drain/tube output:  In: 7218.2 [P.O.:8628;

## 2025-04-20 NOTE — PLAN OF CARE
Problem: Discharge Planning  Goal: Discharge to home or other facility with appropriate resources  4/20/2025 1248 by Francoise Galeas RN  Outcome: Progressing  4/20/2025 0511 by Kay Patton RN  Outcome: Progressing  Flowsheets (Taken 4/19/2025 2000)  Discharge to home or other facility with appropriate resources: Identify barriers to discharge with patient and caregiver     Problem: Pain  Goal: Verbalizes/displays adequate comfort level or baseline comfort level  4/20/2025 1248 by Francoise Galeas RN  Outcome: Progressing  4/20/2025 0511 by Kay Patton RN  Outcome: Progressing     Problem: Safety - Adult  Goal: Free from fall injury  4/20/2025 1248 by Francoise Galeas RN  Outcome: Progressing  4/20/2025 0511 by Kay Patton RN  Outcome: Progressing     Problem: ABCDS Injury Assessment  Goal: Absence of physical injury  4/20/2025 1248 by Francoise Galeas RN  Outcome: Progressing  4/20/2025 0511 by Kay Patton RN  Outcome: Progressing

## 2025-04-20 NOTE — PLAN OF CARE
Problem: Discharge Planning  Goal: Discharge to home or other facility with appropriate resources  4/20/2025 0511 by Kay Patton RN  Outcome: Progressing  Flowsheets (Taken 4/19/2025 2000)  Discharge to home or other facility with appropriate resources: Identify barriers to discharge with patient and caregiver  4/19/2025 1926 by Jos Ruano RN  Outcome: Progressing  Flowsheets (Taken 4/19/2025 0800)  Discharge to home or other facility with appropriate resources: Identify barriers to discharge with patient and caregiver     Problem: Pain  Goal: Verbalizes/displays adequate comfort level or baseline comfort level  4/20/2025 0511 by Kay Patton RN  Outcome: Progressing  4/19/2025 1926 by Jos Ruano RN  Outcome: Progressing  Flowsheets (Taken 4/19/2025 1702)  Verbalizes/displays adequate comfort level or baseline comfort level: Encourage patient to monitor pain and request assistance     Problem: Safety - Adult  Goal: Free from fall injury  4/20/2025 0511 by Kay Patton RN  Outcome: Progressing  4/19/2025 1926 by Jos Ruano RN  Outcome: Progressing     Problem: ABCDS Injury Assessment  Goal: Absence of physical injury  4/20/2025 0511 by Kay Patton RN  Outcome: Progressing  4/19/2025 1926 by Jos Ruano RN  Outcome: Progressing

## 2025-04-21 VITALS
HEIGHT: 62 IN | HEART RATE: 68 BPM | OXYGEN SATURATION: 98 % | DIASTOLIC BLOOD PRESSURE: 65 MMHG | RESPIRATION RATE: 13 BRPM | TEMPERATURE: 98.2 F | WEIGHT: 159 LBS | BODY MASS INDEX: 29.26 KG/M2 | SYSTOLIC BLOOD PRESSURE: 117 MMHG

## 2025-04-21 LAB
ANION GAP SERPL CALCULATED.3IONS-SCNC: 10 MMOL/L (ref 9–16)
BASOPHILS # BLD: 0.04 K/UL (ref 0–0.2)
BASOPHILS NFR BLD: 1 % (ref 0–2)
BUN SERPL-MCNC: 5 MG/DL (ref 6–20)
CALCIUM SERPL-MCNC: 8.3 MG/DL (ref 8.6–10.4)
CHLORIDE SERPL-SCNC: 104 MMOL/L (ref 98–107)
CO2 SERPL-SCNC: 25 MMOL/L (ref 20–31)
CREAT SERPL-MCNC: 0.6 MG/DL (ref 0.6–0.9)
EOSINOPHIL # BLD: 0.35 K/UL (ref 0–0.44)
EOSINOPHILS RELATIVE PERCENT: 5 % (ref 1–4)
ERYTHROCYTE [DISTWIDTH] IN BLOOD BY AUTOMATED COUNT: 14.8 % (ref 11.8–14.4)
GFR, ESTIMATED: >90 ML/MIN/1.73M2
GLUCOSE SERPL-MCNC: 91 MG/DL (ref 74–99)
HCT VFR BLD AUTO: 28.5 % (ref 36.3–47.1)
HGB BLD-MCNC: 8.9 G/DL (ref 11.9–15.1)
IMM GRANULOCYTES # BLD AUTO: <0.03 K/UL (ref 0–0.3)
IMM GRANULOCYTES NFR BLD: 0 %
LYMPHOCYTES NFR BLD: 1.52 K/UL (ref 1.1–3.7)
LYMPHOCYTES RELATIVE PERCENT: 23 % (ref 24–43)
MCH RBC QN AUTO: 28 PG (ref 25.2–33.5)
MCHC RBC AUTO-ENTMCNC: 31.2 G/DL (ref 28.4–34.8)
MCV RBC AUTO: 89.6 FL (ref 82.6–102.9)
MONOCYTES NFR BLD: 0.52 K/UL (ref 0.1–1.2)
MONOCYTES NFR BLD: 8 % (ref 3–12)
NEUTROPHILS NFR BLD: 63 % (ref 36–65)
NEUTS SEG NFR BLD: 4.22 K/UL (ref 1.5–8.1)
NRBC BLD-RTO: 0 PER 100 WBC
PLATELET # BLD AUTO: 336 K/UL (ref 138–453)
PMV BLD AUTO: 9.5 FL (ref 8.1–13.5)
POTASSIUM SERPL-SCNC: 3.8 MMOL/L (ref 3.7–5.3)
RBC # BLD AUTO: 3.18 M/UL (ref 3.95–5.11)
RBC # BLD: ABNORMAL 10*6/UL
SODIUM SERPL-SCNC: 139 MMOL/L (ref 136–145)
SURGICAL PATHOLOGY REPORT: NORMAL
WBC OTHER # BLD: 6.7 K/UL (ref 3.5–11.3)

## 2025-04-21 PROCEDURE — 99024 POSTOP FOLLOW-UP VISIT: CPT | Performed by: SURGERY

## 2025-04-21 PROCEDURE — 6360000002 HC RX W HCPCS

## 2025-04-21 PROCEDURE — 6370000000 HC RX 637 (ALT 250 FOR IP)

## 2025-04-21 PROCEDURE — 80048 BASIC METABOLIC PNL TOTAL CA: CPT

## 2025-04-21 PROCEDURE — 85025 COMPLETE CBC W/AUTO DIFF WBC: CPT

## 2025-04-21 PROCEDURE — 36415 COLL VENOUS BLD VENIPUNCTURE: CPT

## 2025-04-21 RX ORDER — METHOCARBAMOL 750 MG/1
750 TABLET, FILM COATED ORAL
Qty: 40 TABLET | Refills: 0 | Status: SHIPPED | OUTPATIENT
Start: 2025-04-21 | End: 2025-05-01

## 2025-04-21 RX ORDER — OXYCODONE HYDROCHLORIDE 5 MG/1
5 TABLET ORAL EVERY 6 HOURS PRN
Qty: 20 TABLET | Refills: 0 | Status: SHIPPED | OUTPATIENT
Start: 2025-04-21 | End: 2025-04-26

## 2025-04-21 RX ORDER — GABAPENTIN 300 MG/1
300 CAPSULE ORAL EVERY 8 HOURS
Qty: 30 CAPSULE | Refills: 0 | Status: SHIPPED | OUTPATIENT
Start: 2025-04-21 | End: 2025-05-01

## 2025-04-21 RX ORDER — FAMOTIDINE 20 MG/1
20 TABLET, FILM COATED ORAL 2 TIMES DAILY
Status: DISCONTINUED | OUTPATIENT
Start: 2025-04-21 | End: 2025-04-21 | Stop reason: HOSPADM

## 2025-04-21 RX ADMIN — ACETAMINOPHEN 1000 MG: 500 TABLET ORAL at 04:47

## 2025-04-21 RX ADMIN — FAMOTIDINE 20 MG: 20 TABLET, FILM COATED ORAL at 09:18

## 2025-04-21 RX ADMIN — METHOCARBAMOL 750 MG: 750 TABLET ORAL at 09:17

## 2025-04-21 RX ADMIN — HYDROMORPHONE HYDROCHLORIDE 0.5 MG: 1 INJECTION, SOLUTION INTRAMUSCULAR; INTRAVENOUS; SUBCUTANEOUS at 06:00

## 2025-04-21 RX ADMIN — METHOCARBAMOL 750 MG: 750 TABLET ORAL at 00:05

## 2025-04-21 RX ADMIN — KETOROLAC TROMETHAMINE 15 MG: 30 INJECTION, SOLUTION INTRAMUSCULAR; INTRAVENOUS at 00:05

## 2025-04-21 RX ADMIN — KETOROLAC TROMETHAMINE 15 MG: 30 INJECTION, SOLUTION INTRAMUSCULAR; INTRAVENOUS at 14:24

## 2025-04-21 RX ADMIN — METHOCARBAMOL 750 MG: 750 TABLET ORAL at 14:25

## 2025-04-21 RX ADMIN — ENOXAPARIN SODIUM 40 MG: 100 INJECTION SUBCUTANEOUS at 09:17

## 2025-04-21 RX ADMIN — GABAPENTIN 300 MG: 300 CAPSULE ORAL at 09:17

## 2025-04-21 RX ADMIN — ACETAMINOPHEN 1000 MG: 500 TABLET ORAL at 12:17

## 2025-04-21 RX ADMIN — GABAPENTIN 300 MG: 300 CAPSULE ORAL at 00:05

## 2025-04-21 RX ADMIN — KETOROLAC TROMETHAMINE 15 MG: 30 INJECTION, SOLUTION INTRAMUSCULAR; INTRAVENOUS at 09:16

## 2025-04-21 ASSESSMENT — PAIN DESCRIPTION - LOCATION
LOCATION: ABDOMEN
LOCATION: ABDOMEN

## 2025-04-21 ASSESSMENT — PAIN SCALES - GENERAL
PAINLEVEL_OUTOF10: 8
PAINLEVEL_OUTOF10: 1
PAINLEVEL_OUTOF10: 10

## 2025-04-21 ASSESSMENT — PAIN DESCRIPTION - DESCRIPTORS
DESCRIPTORS: THROBBING;ACHING;TIGHTNESS
DESCRIPTORS: DISCOMFORT

## 2025-04-21 ASSESSMENT — PAIN - FUNCTIONAL ASSESSMENT: PAIN_FUNCTIONAL_ASSESSMENT: ACTIVITIES ARE NOT PREVENTED

## 2025-04-21 NOTE — CARE COORDINATION
Case Management   Daily Progress Note       Patient Name: Eunice Ortega                   YOB: 1975  Diagnosis: Diverticulitis [K57.92]  S/P colostomy takedown [Z98.890]                       GMLOS: 4.9 days  Length of Stay: 4  days    Anticipated Discharge Date: To be determined    Readmission Risk (Low < 19, Mod (19-27), High > 27): Readmission Risk Score: 7.1        Current Transitional Plan    [x] Home Independently    [x] Home with HC    [] Skilled Nursing Facility    [] Acute Rehabilitation    [] Long Term Acute Care (LTAC)    [] Other:     Plan for the Stay (Medical Management) :          Workflow Continuation (Additional Notes) :    Called and left a  with [facility] to confirm [facility] services. Awaiting callback.     Gwendolny with [facility] called and confirmed they are able to accept with [facility] services.     Met with patient to discuss transitional planning. Pt. Continues to support the plan to discharge home. Pt. Was informed that [facility] is able to assist with home care if needed. SW consult placed to talk/assist patient with food/utility resources. Pt. Verbalized understanding. Will continue to follow for needs/services.     Called and informed Gwendolyn with [facility] that the patient is interested in home care and would need wound care assistance as well. Pt. Stated she is willing and able to learn how to manage wound dressing changes and care.     Gaston Montero  April 21, 2025

## 2025-04-21 NOTE — DISCHARGE INSTR - COC
Continuity of Care Form    Patient Name: Eunice Ortega   :  1975  MRN:  [id number]    Admit date:  2025  Discharge date:  ***    Code Status Order: Full Code   Advance Directives:    Date/Time Healthcare Directive Type of Healthcare Directive Copy in Chart Healthcare Agent Appointed Healthcare Agent's Name Healthcare Agent's Phone Number    [phone] No, patient does not have an advance directive for healthcare treatment  --  --  --  --  --             Admitting Physician:  Irwin Bryant MD  PCP: Gloria Gusman APRN - CNP    Discharging Nurse: ***  Discharging Hospital Unit/Room#: 0142/0142-01  Discharging Unit Phone Number: ***    Emergency Contact:   Extended Emergency Contact Information  Primary Emergency Contact: Inés Villagomez  [place] Phone: [phone]  Relation: Child    Past Surgical History:  Past Surgical History:   Procedure Laterality Date    ABDOMINAL EXPLORATION SURGERY  10/04/2024    APAROTOMY EXPLORATORY, SIGMOIDECTOMY, APPENDCTOMY, END COLOSTOMY    APPENDECTOMY  10/04/2024    COLOSTOMY  10/04/2024    COLOSTOMY N/A     OPEN COLOSTOMY REVERSAL, APPENDECTOMY, LYSIS OF ADHESIONS    LAPAROTOMY N/A 10/04/2024    LAPAROTOMY EXPLORATORY, SIGMOIDECTOMY, APPENDCTOMY, END COLOSTOMY performed by Geovanni Lopez MD at [facility] OR    PARACENTESIS  10/15/2024    PROCTOSIGMOIDOSCOPY N/A 2025    FLEXIBLE SIGMOIDOSCOPY- GI SCHEDULED performed by Irwin Bryant MD at [facility] OR    SMALL INTESTINE SURGERY N/A 2025    OPEN COLOSTOMY REVERSAL, APPENDECTOMY, LYSIS OF ADHESIONS performed by Irwin Bryant MD at [facility] OR    THORACENTESIS  10/15/2024    TUBAL LIGATION         Immunization History:   Immunization History   Administered Date(s) Administered    COVID-19, PFIZER PURPLE top, DILUTE for use, (age 12 y+), 30mcg/0.3mL 2021, 2021, 2022       Active Problems:  Patient Active Problem List   Diagnosis Code    Acute diverticulitis K57.92    Acute

## 2025-04-21 NOTE — DISCHARGE INSTRUCTIONS
Discharge Instructions for General Surgery    No lifting above 10Ibs for next 2 weeks.  No soaking in bathtubs or submerging yourself in water for 4 weeks  Resume activity as tolerated, No operating heavy equipment while using narcotics Wash incision gently with soap and water, pat dry.  If steri-strips or surgical glue in place wash gently and leave in place until the glue or strips fall off. (Do not pull/tug)    F/u in trauma/acute care surgery clinic in 1-2 weeks Call your doctor for the following:  Chills  Temperature greater than 101  Pain that is not tolerable despite taking pain medicine as ordered There is increased swelling, redness or warmth at surgical site There is increased drainage or bleeding from surgical site    Recommended diet: regular diet  Depending on your injuries, your doctor may want you to follow a specific diet. Some pain medicine can cause constipation . To avoid this problem:  Drink plenty of fluids.  Eat foods high in fiber , such as:  Whole grain cereals and bread  Fruits and vegtables   Legumes (eg, beans, lentils)      If you are still having problems, talk to your doctor about using laxatives or stool softeners.    General questions or concerns call [phone] for the General Surgery Clinic.  If needed, the clinic fax number is [phone]

## 2025-04-21 NOTE — CARE COORDINATION
Consult :resources for food/utilities  Met with pt this date was alert and oriented.  Pt inquired about help with utilities and food  Provided pt with [facility] Food Assistance Resource Guide and list of utility assistance programs.  Pt was appreciative of information given.

## 2025-04-21 NOTE — PLAN OF CARE
Problem: Discharge Planning  Goal: Discharge to home or other facility with appropriate resources  4/21/2025 1720 by Gwendolyn Mendez RN  Outcome: Adequate for Discharge  4/21/2025 1607 by Gwendolyn Mendez RN  Outcome: Progressing  4/21/2025 0500 by Marquita Pollard RN  Outcome: Progressing     Problem: Pain  Goal: Verbalizes/displays adequate comfort level or baseline comfort level  4/21/2025 1720 by Gwendolyn Mendez RN  Outcome: Adequate for Discharge  4/21/2025 1607 by Gwendolyn Mendez RN  Outcome: Progressing  4/21/2025 0500 by Marquita Pollard RN  Outcome: Progressing     Problem: Safety - Adult  Goal: Free from fall injury  4/21/2025 1720 by Gwendolyn Mendez RN  Outcome: Adequate for Discharge  4/21/2025 1607 by Gwendolyn Mendez RN  Outcome: Progressing  4/21/2025 0500 by Marquita Pollard RN  Outcome: Progressing     Problem: ABCDS Injury Assessment  Goal: Absence of physical injury  4/21/2025 1720 by Gwendolyn Mendez RN  Outcome: Adequate for Discharge  4/21/2025 1607 by Gwendolyn Mendez RN  Outcome: Progressing  4/21/2025 0500 by Marquita Pollard RN  Outcome: Progressing

## 2025-04-21 NOTE — PLAN OF CARE
Problem: Discharge Planning  Goal: Discharge to home or other facility with appropriate resources  4/21/2025 1607 by Gwendolyn Mendez RN  Outcome: Progressing  4/21/2025 0500 by Marquita Pollard RN  Outcome: Progressing     Problem: Pain  Goal: Verbalizes/displays adequate comfort level or baseline comfort level  4/21/2025 1607 by Gwendolyn Mendez RN  Outcome: Progressing  4/21/2025 0500 by Marquita Pollard RN  Outcome: Progressing     Problem: Safety - Adult  Goal: Free from fall injury  4/21/2025 1607 by Gwendolyn Mendez RN  Outcome: Progressing  4/21/2025 0500 by Marquita Pollard RN  Outcome: Progressing     Problem: ABCDS Injury Assessment  Goal: Absence of physical injury  4/21/2025 1607 by Gwendolyn Mendez RN  Outcome: Progressing  4/21/2025 0500 by Marquita Pollard RN  Outcome: Progressing      Patient Education        Atopic Dermatitis in Children: Care Instructions  Your Care Instructions  Atopic dermatitis (also called eczema) is a skin problem that causes intense itching and a red, raised rash. The rash may have tiny blisters, which break and crust over. Children with this condition seem to have very sensitive immune systems that are likely to react to things that cause allergies. The immune system is the body's way of fighting infection. Children who have atopic dermatitis often have asthma or hay fever and other allergies, including food allergies. There is no cure for atopic dermatitis, but you may be able to control it. Some children may outgrow the condition. Follow-up care is a key part of your child's treatment and safety. Be sure to make and go to all appointments, and call your doctor if your child is having problems. It's also a good idea to know your child's test results and keep a list of the medicines your child takes. How can you care for your child at home? · Use moisturizer at least twice a day. · If your doctor prescribes a cream, use it as directed. If your doctor prescribes other medicine, give it exactly as directed. · Have your child bathe in warm (not hot) water. Do not use bath oils. Limit baths to 5 minutes. · Do not use soap at every bath. When you do need soap, use a gentle, nondrying cleanser such as Aveeno, Basis, Dove, or Neutrogena. · Apply a moisturizer after bathing. Use a cream such as Lubriderm, Moisturel, or Cetaphil that does not irritate the skin or cause a rash. Apply the cream while your child's skin is still damp after lightly drying with a towel. · Place cold, wet cloths on the rash to help with itching. · Keep your child's fingernails trimmed and filed smooth to help prevent scratching. Wearing mittens or cotton socks on the hands may help keep your child from scratching the rash. · Wash clothes and bedding in mild detergent.  Use an unscented fabric softener. Choose soft clothing and bedding. · For a very itchy rash, ask your doctor before you give your child an over-the-counter antihistamine such as Benadryl or Claritin. It helps relieve itching in some children. In others, it has little or no effect. Read and follow all instructions on the label. When should you call for help? Call your doctor now or seek immediate medical care if:    · Your child has a rash and a fever.     · Your child has new blisters or bruises, or a rash spreads and looks like a sunburn.     · Your child has crusting or oozing sores.     · Your child has joint aches or body aches with a rash.     · Your child has signs of infection. These include:  ? Increased pain, swelling, redness, or warmth around the rash. ? Red streaks leading from the rash. ? Pus draining from the rash. ? A fever.    Watch closely for changes in your child's health, and be sure to contact your doctor if:    · A rash does not clear up after 2 to 3 weeks of home treatment.     · You cannot control your child's itching.     · Your child has problems with the medicine. Where can you learn more? Go to http://harriett-reji.info/. Enter V303 in the search box to learn more about \"Atopic Dermatitis in Children: Care Instructions. \"  Current as of: April 1, 2019  Content Version: 12.2  © 5167-6918 [employer]. Care instructions adapted under license by [employer] (which disclaims liability or warranty for this information). If you have questions about a medical condition or this instruction, always ask your healthcare professional. [address] any warranty or liability for your use of this information.

## 2025-04-21 NOTE — PROGRESS NOTES
CLINICAL PHARMACY NOTE: MEDS TO BEDS    Total # of Prescriptions Filled: 3   The following medications were delivered to the patient:  Gabapentin 300 mg   Methocarbamol 750 mg   Oxycodone hcl 5 mg tabs    Additional Documentation:  X3 delivered to patient room 142 on 4/21 at 3:57p. $5.34 paid w card.

## 2025-05-06 ENCOUNTER — OFFICE VISIT (OUTPATIENT)
Age: 50
End: 2025-05-06

## 2025-05-06 VITALS
HEIGHT: 62 IN | BODY MASS INDEX: 29.26 KG/M2 | HEART RATE: 85 BPM | DIASTOLIC BLOOD PRESSURE: 85 MMHG | WEIGHT: 159 LBS | SYSTOLIC BLOOD PRESSURE: 120 MMHG

## 2025-05-06 DIAGNOSIS — Z98.890 HISTORY OF COLOSTOMY REVERSAL: Primary | ICD-10-CM

## 2025-05-06 PROCEDURE — 99024 POSTOP FOLLOW-UP VISIT: CPT | Performed by: NURSE PRACTITIONER

## 2025-05-06 NOTE — PROGRESS NOTES
General Surgery   [address] [place] [facility], [facility] [address]  [place] [address]  [phone]  [id number] [place] [address]  [phone]  [email or web address]    Clinic Note - Post-op Patient      Patient: Eunice Ortega  MRN: [id number]  YOB: 1975 (49 y.o.)    Date of Office Visit: May 6, 2025     CC: Post op follow up    SUBJECTIVE:  Eunice Ortega is a 49 y.o. female who is seen at the [facility] surgery clinic for post op follow up from colostomy reversal, appendectomy and lysis of adhesions 4/17. Eating a regular diet without difficulty.  The patient is not having any pain. She is having some anal pain when she has BM's. She denies issues with her incision and ostomy site.       6 months from surgery per GI    Physical Exam:    Vitals:    05/06/25 1234   BP: 120/85   Pulse: 85     Physical Exam  Constitutional:       General: She is not in acute distress.     Appearance: Normal appearance. She is not ill-appearing.   Cardiovascular:      Rate and Rhythm: Normal rate.   Pulmonary:      Effort: Pulmonary effort is normal.   Abdominal:      General: There is no distension.      Palpations: Abdomen is soft.      Comments: ML incision and L old ostomy site with iodoform packing   Skin:     General: Skin is cool.      Findings: No wound.   Neurological:      General: No focal deficit present.      Mental Status: She is alert and oriented to person, place, and time.       Pathology:     SURGICAL PATHOLOGY REPORT  Order: [id number]   Status: Final result       Next appt: 05/27/2025 at 01:30 PM in Family Medicine (UDAY Ledesma CNP)    Test Result Released: Yes (not seen)    0 Result Notes      Component  Ref Range & Units (hover) 4/17/25 1031   Surgical Pathology Report Path Number: [id number]    -- Diagnosis --  A.  Appendiceal remnant:    -Portion of appendix.    -Lymph node with hyalinized granuloma.    B.  Colostomy reversal:    -Serosal and subepithelial

## (undated) DEVICE — COVER OR TBL W40XL90IN ABSRB STD AND GRIPPY BK SAHARA

## (undated) DEVICE — 450 ML BOTTLE OF 0.05% CHLORHEXIDINE GLUCONATE IN 99.95% STERILE WATER FOR IRRIGATION, USP AND APPLICATOR.: Brand: IRRISEPT ANTIMICROBIAL WOUND LAVAGE

## (undated) DEVICE — SPONGE LAP W18XL18IN WHT COT 4 PLY FLD STRUNG RADPQ DISP ST 2 PER PACK

## (undated) DEVICE — SUTURE PDS II SZ 0 L60IN ABSRB VLT L65MM TP-1 1/2 CIR Z991G

## (undated) DEVICE — STAPLER INT L16CM STD UNIV RELD DISP TRI-STAPLE ENDO GIA

## (undated) DEVICE — INVOICE RENTAL KCI WOUND VAC

## (undated) DEVICE — 1LYRTR 16FR10ML100%SIL UMS SNP: Brand: MEDLINE INDUSTRIES, INC.

## (undated) DEVICE — DRAPE,UNDRBUT,WHT GRAD PCH,CAPPORT,20/CS: Brand: MEDLINE

## (undated) DEVICE — DRESSING BORDERED ADH GZ UNIV GEN USE 8INX4IN AND 6INX2IN

## (undated) DEVICE — LEGGINGS, PAIR, 31X48, STERILE: Brand: MEDLINE

## (undated) DEVICE — WOUND RETRACTOR AND PROTECTOR: Brand: ALEXIS O WOUND PROTECTOR-RETRACTOR

## (undated) DEVICE — SUTURE VICRYL + SZ 0 L27IN ABSRB UD CT-1 L36MM 1/2 CIR TAPR VCP260H

## (undated) DEVICE — SUTURE VICRYL SZ 0 L27IN ABSRB UD L36MM CT-1 1/2 CIR J260H

## (undated) DEVICE — STAPLER INT L75MM CUT LN L73MM STPL LN L77MM BLU B FRM 8

## (undated) DEVICE — GARMENT,MEDLINE,DVT,INT,CALF,MED, GEN2: Brand: MEDLINE

## (undated) DEVICE — GLOVE SURG SZ 8 L11.77IN FNGR THK9.8MIL STRW LTX POLYMER

## (undated) DEVICE — STRAP ARMBRD W1.5XL32IN FOAM STR YET SFT W/ HK AND LOOP

## (undated) DEVICE — SUTURE VICRYL + SZ 2-0 L27IN ABSRB VLT UR-6 5/8 CIR TAPR PNT VCP602H

## (undated) DEVICE — DRAPE, SLUSH XL, 44X66, STERILE: Brand: MEDLINE

## (undated) DEVICE — SUTURE PERMA-HAND SZ 3-0 L18IN NONABSORBABLE BLK RB-1 L17MM C053D

## (undated) DEVICE — LIGASURE IMPACT FT10 COMPATIBLE: Brand: MEDLINE

## (undated) DEVICE — CANISTER NEG PRSS L 100ML GRAD OPN ABD THER ABTHERA

## (undated) DEVICE — 3M™ IOBAN™ 2 ANTIMICROBIAL INCISE DRAPE 6650EZ: Brand: IOBAN™ 2

## (undated) DEVICE — SURGICAL SUCTION CONNECTING TUBE WITH MALE CONNECTOR AND SUCTION CLAMP, 2 FT. LONG (.6 M), 5 MM I.D.: Brand: CONMED

## (undated) DEVICE — STRAP,POSITIONING,KNEE/BODY,FOAM,4X60": Brand: MEDLINE

## (undated) DEVICE — STAPLER INT DIA29MM CLS STPL H1.5-2.2MM OPN LEG L5.2MM 26

## (undated) DEVICE — AGENT HEMSTAT W2XL4IN FIBRIN SEAL PTCH DSG EVARREST

## (undated) DEVICE — BAG ISOLATN W20XL20IN PLAS CUF OPN STR CLR FLM W SEAMLESS

## (undated) DEVICE — SUTURE VICRYL SZ 3-0 L18IN ABSRB UD L26MM SH 1/2 CIR J864D

## (undated) DEVICE — SUTURE PERMAHAND SZ 2-0 L12X18IN NONABSORBABLE BLK SILK A185H

## (undated) DEVICE — APPLIER CLP L L13IN TI MULT RNG HNDL 20 CLP STR LIGACLP

## (undated) DEVICE — Device

## (undated) DEVICE — RELOAD STPL L100MM OPN H3.8MM CLS H1.5MM WIRE DIA0.2MM BLU

## (undated) DEVICE — GOWN,SIRUS,POLYRNF,BRTHSLV,XL,30/CS: Brand: MEDLINE

## (undated) DEVICE — DRAPE,REIN 53X77,STERILE: Brand: MEDLINE

## (undated) DEVICE — BINDER ABD UNISX 9IN 62IN L AND XL UNIV

## (undated) DEVICE — SEALANT TISS 10 ML FIBRIN VISTASEAL

## (undated) DEVICE — SUTURE PERMAHAND SZ 0 L18IN NONABSORBABLE BLK SILK BRAID A186H

## (undated) DEVICE — DRESSING L SLV V.A.C. GRANUFOAM

## (undated) DEVICE — RELOAD STPL L75MM OPN H3.8MM CLS 1.5MM WIRE DIA0.2MM REG

## (undated) DEVICE — SUTURE ABSORBABLE L 60 IN SZ 0 NDL L 65 MM POLYDIOXANONE VIO

## (undated) DEVICE — PREMIUM DRY TRAY LF: Brand: MEDLINE INDUSTRIES, INC.

## (undated) DEVICE — Device: Brand: BRAVA

## (undated) DEVICE — RELOAD STPL 2.5MM L60MM 0DEG VASC TISS TAN TI 6 ROW LIN

## (undated) DEVICE — COVER LT HNDL BLU PLAS

## (undated) DEVICE — STAPLER INT L100MM CUT LN L98MM STPL LN L102MM BLU B FRM 8

## (undated) DEVICE — DRESSING BORDERED ADH GZ UNIV GEN USE 5IN 4IN AND 2 1/2IN

## (undated) DEVICE — SUTURE PERMA-HAND SZ 2-0 L30IN NONABSORBABLE BLK L26MM SH K833H

## (undated) DEVICE — GLOVE ORANGE PI 7 1/2   MSG9075

## (undated) DEVICE — LOOP LIG SUT SZ 0 L18IN ABSRB POLYDIOXANONE MFIL PDS II

## (undated) DEVICE — APPLICATOR TISS TIP BIOGLU SYR 10CC 10CM

## (undated) DEVICE — GOWN,SIRUS,NONRNF,SETINSLV,2XL,18/CS: Brand: MEDLINE

## (undated) DEVICE — STAPLER EXT 65MM S STL AUTO DISP PURSTRING

## (undated) DEVICE — GLOVE SURG SZ 75 L12IN FNGR THK79MIL GRN LTX FREE

## (undated) DEVICE — SUTURE PDS II SZ 0 L18IN ABSRB VLT L36MM CT-1 1/2 CIR Z740D

## (undated) DEVICE — SUTURE PERMAHAND SZ 3-0 L30IN NONABSORBABLE BLK SH L26MM K832H

## (undated) DEVICE — YANKAUER,POOLE TIP,STERILE,50/CS: Brand: MEDLINE

## (undated) DEVICE — SUTURE PROL SZ 2-0 L30IN NONABSORBABLE BLU L26MM SH 1/2 CIR 8833H

## (undated) DEVICE — SUTURE VICRYL + SZ 2-0 L27IN ABSRB WHT SH 1/2 CIR TAPERCUT VCP417H

## (undated) DEVICE — STAPLER INT DIA25MM CLS STPL H1.5-2.2MM OPN LEG L5.2MM 22

## (undated) DEVICE — SUTURE PROL SZ 2-0 L30IN NONABSORBABLE BLU L26MM CT-1 1/2 8423H

## (undated) DEVICE — SUTURE PROL SZ 3-0 L30IN NONABSORBABLE BLU L26MM SH 1/2 CIR 8832H

## (undated) DEVICE — PAD GEN USE BORDERED ADH 14IN 2IN AND 12IN 4IN GZ UNIV ST

## (undated) DEVICE — STAPLER INT CUT LN 51MM STPL 51MM BLU CRV HD B FRM

## (undated) DEVICE — ABC HANDPIECE TRIPLE OPTION HANDPIECE: Brand: ABC

## (undated) DEVICE — NEPTUNE E-SEP SMOKE EVACUATION PENCIL, COATED, 70MM BLADE, PUSH BUTTON SWITCH: Brand: NEPTUNE E-SEP